# Patient Record
Sex: MALE | Race: WHITE | NOT HISPANIC OR LATINO | Employment: OTHER | RURAL
[De-identification: names, ages, dates, MRNs, and addresses within clinical notes are randomized per-mention and may not be internally consistent; named-entity substitution may affect disease eponyms.]

---

## 2017-08-08 ENCOUNTER — HISTORICAL (OUTPATIENT)
Dept: ADMINISTRATIVE | Facility: HOSPITAL | Age: 72
End: 2017-08-08

## 2017-08-15 LAB
ESTRIOL SERPL-MCNC: NORMAL NG/ML
LAB AP CLINICAL INFORMATION: NORMAL
LAB AP DIAGNOSIS - HISTORICAL: NORMAL
LAB AP GROSS PATHOLOGY - HISTORICAL: NORMAL
LAB AP SPECIMEN SUBMITTED - HISTORICAL: NORMAL

## 2020-07-20 ENCOUNTER — HISTORICAL (OUTPATIENT)
Dept: ADMINISTRATIVE | Facility: HOSPITAL | Age: 75
End: 2020-07-20

## 2020-09-21 ENCOUNTER — HISTORICAL (OUTPATIENT)
Dept: ADMINISTRATIVE | Facility: HOSPITAL | Age: 75
End: 2020-09-21

## 2020-09-21 LAB — GLUCOSE SERPL-MCNC: 107 MG/DL (ref 70–105)

## 2020-09-22 LAB

## 2021-05-24 RX ORDER — CLOPIDOGREL BISULFATE 75 MG/1
75 TABLET ORAL DAILY
Qty: 30 TABLET | Refills: 11 | OUTPATIENT
Start: 2021-05-24 | End: 2022-03-16 | Stop reason: SDUPTHER

## 2021-05-24 RX ORDER — CLOPIDOGREL BISULFATE 75 MG/1
75 TABLET ORAL DAILY
COMMUNITY
Start: 2021-02-10 | End: 2021-05-24 | Stop reason: SDUPTHER

## 2021-08-04 RX ORDER — DILTIAZEM HYDROCHLORIDE 240 MG/1
240 CAPSULE, COATED, EXTENDED RELEASE ORAL DAILY
Qty: 90 CAPSULE | Refills: 3 | Status: SHIPPED | OUTPATIENT
Start: 2021-08-04 | End: 2022-07-27 | Stop reason: SDUPTHER

## 2021-08-04 RX ORDER — METOPROLOL SUCCINATE 50 MG/1
50 TABLET, EXTENDED RELEASE ORAL DAILY
COMMUNITY
Start: 2021-03-02 | End: 2022-04-13

## 2021-08-04 RX ORDER — DILTIAZEM HYDROCHLORIDE 240 MG/1
240 CAPSULE, COATED, EXTENDED RELEASE ORAL DAILY
COMMUNITY
Start: 2021-04-28 | End: 2021-08-04 | Stop reason: SDUPTHER

## 2021-08-04 RX ORDER — ROSUVASTATIN CALCIUM 10 MG/1
10 TABLET, COATED ORAL DAILY
COMMUNITY
Start: 2021-03-02 | End: 2022-06-22 | Stop reason: DRUGHIGH

## 2021-08-04 RX ORDER — ALLOPURINOL 300 MG/1
300 TABLET ORAL DAILY
COMMUNITY
Start: 2021-03-19 | End: 2022-01-14 | Stop reason: SDUPTHER

## 2022-01-14 RX ORDER — ALLOPURINOL 300 MG/1
300 TABLET ORAL DAILY
Qty: 90 TABLET | Refills: 6 | Status: SHIPPED | OUTPATIENT
Start: 2022-01-14 | End: 2023-05-02 | Stop reason: SDUPTHER

## 2022-03-16 RX ORDER — CLOPIDOGREL BISULFATE 75 MG/1
75 TABLET ORAL DAILY
Qty: 90 TABLET | Refills: 0 | Status: SHIPPED | OUTPATIENT
Start: 2022-03-16 | End: 2022-06-14 | Stop reason: SDUPTHER

## 2022-05-20 RX ORDER — METOPROLOL SUCCINATE 50 MG/1
50 TABLET, EXTENDED RELEASE ORAL DAILY
Qty: 90 TABLET | Refills: 1 | Status: SHIPPED | OUTPATIENT
Start: 2022-05-20 | End: 2022-12-06 | Stop reason: SDUPTHER

## 2022-06-13 ENCOUNTER — APPOINTMENT (OUTPATIENT)
Dept: LAB | Facility: CLINIC | Age: 77
End: 2022-06-13
Payer: MEDICARE

## 2022-06-13 DIAGNOSIS — R53.83 FATIGUE, UNSPECIFIED TYPE: ICD-10-CM

## 2022-06-13 DIAGNOSIS — J02.9 SORE THROAT: Primary | ICD-10-CM

## 2022-06-13 DIAGNOSIS — J06.9 UPPER RESPIRATORY TRACT INFECTION, UNSPECIFIED TYPE: ICD-10-CM

## 2022-06-13 DIAGNOSIS — U07.1 COVID-19: Primary | ICD-10-CM

## 2022-06-13 LAB
CTP QC/QA: YES
FLUAV AG NPH QL: NEGATIVE
FLUBV AG NPH QL: NEGATIVE
SARS-COV-2 AG RESP QL IA.RAPID: POSITIVE

## 2022-06-13 PROCEDURE — 87428 SARSCOV & INF VIR A&B AG IA: CPT | Mod: RHCUB | Performed by: FAMILY MEDICINE

## 2022-06-14 RX ORDER — CLOPIDOGREL BISULFATE 75 MG/1
75 TABLET ORAL DAILY
Qty: 90 TABLET | Refills: 1 | Status: ON HOLD | OUTPATIENT
Start: 2022-06-14 | End: 2023-01-04 | Stop reason: SDUPTHER

## 2022-06-22 ENCOUNTER — OFFICE VISIT (OUTPATIENT)
Dept: CARDIOLOGY | Facility: CLINIC | Age: 77
End: 2022-06-22
Payer: MEDICARE

## 2022-06-22 DIAGNOSIS — E78.5 HYPERLIPIDEMIA, UNSPECIFIED HYPERLIPIDEMIA TYPE: Chronic | ICD-10-CM

## 2022-06-22 DIAGNOSIS — I25.10 CORONARY ARTERY DISEASE INVOLVING NATIVE CORONARY ARTERY OF NATIVE HEART WITHOUT ANGINA PECTORIS: Primary | Chronic | ICD-10-CM

## 2022-06-22 DIAGNOSIS — I10 HYPERTENSION, ESSENTIAL: Chronic | ICD-10-CM

## 2022-06-22 DIAGNOSIS — I25.5 ISCHEMIC CARDIOMYOPATHY: Chronic | ICD-10-CM

## 2022-06-22 PROCEDURE — 93010 ELECTROCARDIOGRAM REPORT: CPT | Mod: S$PBB,,, | Performed by: INTERNAL MEDICINE

## 2022-06-22 PROCEDURE — 99214 PR OFFICE/OUTPT VISIT, EST, LEVL IV, 30-39 MIN: ICD-10-PCS | Mod: S$PBB,,, | Performed by: INTERNAL MEDICINE

## 2022-06-22 PROCEDURE — 93005 ELECTROCARDIOGRAM TRACING: CPT | Mod: PBBFAC | Performed by: INTERNAL MEDICINE

## 2022-06-22 PROCEDURE — 99214 OFFICE O/P EST MOD 30 MIN: CPT | Mod: S$PBB,,, | Performed by: INTERNAL MEDICINE

## 2022-06-22 PROCEDURE — 99213 OFFICE O/P EST LOW 20 MIN: CPT | Mod: PBBFAC | Performed by: INTERNAL MEDICINE

## 2022-06-22 PROCEDURE — 93010 EKG 12-LEAD: ICD-10-PCS | Mod: S$PBB,,, | Performed by: INTERNAL MEDICINE

## 2022-06-22 RX ORDER — ROSUVASTATIN CALCIUM 5 MG/1
5 TABLET, COATED ORAL DAILY
Qty: 90 TABLET | Refills: 3 | Status: SHIPPED | OUTPATIENT
Start: 2022-06-22 | End: 2023-07-05 | Stop reason: SDUPTHER

## 2022-06-23 VITALS
WEIGHT: 195.25 LBS | BODY MASS INDEX: 27.95 KG/M2 | DIASTOLIC BLOOD PRESSURE: 68 MMHG | SYSTOLIC BLOOD PRESSURE: 104 MMHG | RESPIRATION RATE: 12 BRPM | HEIGHT: 70 IN | HEART RATE: 66 BPM

## 2022-06-23 PROBLEM — I25.10 CORONARY ARTERY DISEASE INVOLVING NATIVE CORONARY ARTERY OF NATIVE HEART WITHOUT ANGINA PECTORIS: Chronic | Status: ACTIVE | Noted: 2022-06-23

## 2022-06-23 PROBLEM — I25.5 ISCHEMIC CARDIOMYOPATHY: Chronic | Status: ACTIVE | Noted: 2022-06-23

## 2022-06-23 PROBLEM — E78.5 HYPERLIPIDEMIA: Chronic | Status: ACTIVE | Noted: 2022-06-23

## 2022-06-23 PROBLEM — I10 HYPERTENSION, ESSENTIAL: Chronic | Status: ACTIVE | Noted: 2022-06-23

## 2022-06-27 ENCOUNTER — OFFICE VISIT (OUTPATIENT)
Dept: FAMILY MEDICINE | Facility: CLINIC | Age: 77
End: 2022-06-27
Payer: MEDICARE

## 2022-06-27 VITALS
RESPIRATION RATE: 18 BRPM | HEIGHT: 70 IN | HEART RATE: 72 BPM | OXYGEN SATURATION: 99 % | SYSTOLIC BLOOD PRESSURE: 125 MMHG | BODY MASS INDEX: 28.06 KG/M2 | DIASTOLIC BLOOD PRESSURE: 65 MMHG | WEIGHT: 196 LBS

## 2022-06-27 DIAGNOSIS — J02.9 PHARYNGITIS, UNSPECIFIED ETIOLOGY: Primary | ICD-10-CM

## 2022-06-27 PROCEDURE — 99213 PR OFFICE/OUTPT VISIT, EST, LEVL III, 20-29 MIN: ICD-10-PCS | Mod: ,,, | Performed by: FAMILY MEDICINE

## 2022-06-27 PROCEDURE — 99213 OFFICE O/P EST LOW 20 MIN: CPT | Mod: ,,, | Performed by: FAMILY MEDICINE

## 2022-06-27 RX ORDER — AZITHROMYCIN 250 MG/1
TABLET, FILM COATED ORAL
Qty: 6 TABLET | Refills: 0 | Status: SHIPPED | OUTPATIENT
Start: 2022-06-27 | End: 2022-08-22

## 2022-06-27 NOTE — PROGRESS NOTES
Yobany Marsh MD        PATIENT NAME: Gris Hobbs  : 1945  DATE: 22  MRN: [id number]      Billing Provider: Yobany Marsh MD  Level of Service: SC OFFICE/OUTPT VISIT, EST, LEVL III, 20-29 MIN  Patient PCP Information     Provider PCP Type    Yobany Marsh MD General          Reason for Visit / Chief Complaint: Sore Throat (Patient states that he has been having mucus since last thursday)       Update PCP  Update Chief Complaint         History of Present Illness / Problem Focused Workflow     Gris Hobbs presents to the clinic with Sore Throat (Patient states that he has been having mucus since last thursday)     Cough x one week with sore throat.        Review of Systems     Review of Systems   Constitutional: Negative for activity change, appetite change, fever and unexpected weight change.   HENT: Negative for congestion, rhinorrhea, sinus pressure, sinus pain, sore throat and trouble swallowing.    Eyes: Negative for photophobia, pain, discharge and visual disturbance.   Respiratory: Positive for cough. Negative for chest tightness, wheezing and stridor.    Cardiovascular: Negative for chest pain, palpitations and leg swelling.   Gastrointestinal: Negative for abdominal pain, blood in stool, constipation, diarrhea and nausea.   Endocrine: Negative for polydipsia, polyphagia and polyuria.   Genitourinary: Negative for difficulty urinating, flank pain and hematuria.   Musculoskeletal: Negative for arthralgias and neck pain.   Skin: Negative for rash.   Allergic/Immunologic: Negative for food allergies.   Neurological: Negative for dizziness, tremors, seizures, syncope, weakness (global weakness) and headaches.   Psychiatric/Behavioral: Negative for behavioral problems, confusion, decreased concentration, dysphoric mood and hallucinations. The patient is not nervous/anxious.         Medical / Social / Family History     Past Medical History:   Diagnosis Date    Atrial fibrillation      Coronary artery disease involving native coronary artery of native heart without angina pectoris     Gout     Hyperlipidemia     Hypertension     Ischemic cardiomyopathy     NSTEMI (non-ST elevated myocardial infarction) 10/2019       Past Surgical History:   Procedure Laterality Date    APPENDECTOMY      BIOPSY WITH TRANSRECTAL ULTRASOUND (TRUS) GUIDANCE      CARDIAC CATHETERIZATION         Social History    reports that he has never smoked. He has never used smokeless tobacco. He reports that he does not drink alcohol and does not use drugs.    Family History  's family history includes Cancer in his father; Colon cancer in his father; Heart attack in his mother.    Medications and Allergies     Medications  No outpatient medications have been marked as taking for the 6/27/22 encounter (Office Visit) with Yobany Marsh MD.       Allergies  Review of patient's allergies indicates:   Allergen Reactions    Pcn [penicillins]        Physical Examination     Vitals:    06/27/22 1354   BP: 125/65   Pulse: 72   Resp: 18     Physical Exam  Constitutional:       General: He is not in acute distress.     Appearance: Normal appearance.   HENT:      Head: Normocephalic.      Right Ear: Tympanic membrane and ear canal normal.      Left Ear: Tympanic membrane and ear canal normal.      Nose: Nose normal.      Mouth/Throat:      Mouth: Mucous membranes are moist.      Pharynx: No oropharyngeal exudate.   Eyes:      Extraocular Movements: Extraocular movements intact.      Pupils: Pupils are equal, round, and reactive to light.   Cardiovascular:      Rate and Rhythm: Normal rate and regular rhythm.      Heart sounds: No murmur heard.  Pulmonary:      Effort: Pulmonary effort is normal.      Breath sounds: Normal breath sounds. No wheezing.   Abdominal:      General: Abdomen is flat. Bowel sounds are normal.      Palpations: Abdomen is soft.      Hernia: No hernia is present.   Musculoskeletal:          General: Normal range of motion.      Cervical back: Normal range of motion and neck supple.      Right lower leg: No edema.      Left lower leg: No edema.   Lymphadenopathy:      Cervical: No cervical adenopathy.   Skin:     General: Skin is warm and dry.      Coloration: Skin is not jaundiced.      Findings: No lesion.   Neurological:      General: No focal deficit present.      Mental Status: He is alert and oriented to person, place, and time.      Cranial Nerves: No cranial nerve deficit.      Gait: Gait normal.   Psychiatric:         Mood and Affect: Mood normal.         Behavior: Behavior normal.         Judgment: Judgment normal.          Assessment and Plan (including Health Maintenance)      Problem List  Smart InRadio  Document Outside HM   :    Plan:   Pharyngitis, unspecified etiology  -     azithromycin (Z-AMADA) 250 MG tablet; Take two tablets now then 1 tab daily x 4 days  Dispense: 6 tablet; Refill: 0           Health Maintenance Due   Topic Date Due    Hepatitis C Screening  Never done    Lipid Panel  Never done    TETANUS VACCINE  Never done    Shingles Vaccine (1 of 2) Never done    Pneumococcal Vaccines (Age 65+) (1 - PCV) Never done    COVID-19 Vaccine (2 - Moderna series) 03/29/2021       Problem List Items Addressed This Visit    None     Visit Diagnoses     Pharyngitis, unspecified etiology    -  Primary    Relevant Medications    azithromycin (Z-AMADA) 250 MG tablet          Health Maintenance Topics with due status: Not Due       Topic Last Completion Date    Influenza Vaccine Not Due       No future appointments.     There are no Patient Instructions on file for this visit.  Follow up if symptoms worsen or fail to improve.     Signature:  Yobany Marsh MD      Date of encounter: 6/27/22

## 2022-07-25 DIAGNOSIS — E78.5 HYPERLIPIDEMIA, UNSPECIFIED HYPERLIPIDEMIA TYPE: Primary | ICD-10-CM

## 2022-07-25 DIAGNOSIS — Z79.899 ENCOUNTER FOR LONG-TERM (CURRENT) USE OF OTHER MEDICATIONS: ICD-10-CM

## 2022-07-27 RX ORDER — DILTIAZEM HYDROCHLORIDE 240 MG/1
240 CAPSULE, COATED, EXTENDED RELEASE ORAL DAILY
Qty: 90 CAPSULE | Refills: 3 | Status: SHIPPED | OUTPATIENT
Start: 2022-07-27 | End: 2023-06-23

## 2022-08-22 ENCOUNTER — OFFICE VISIT (OUTPATIENT)
Dept: FAMILY MEDICINE | Facility: CLINIC | Age: 77
End: 2022-08-22
Payer: MEDICARE

## 2022-08-22 VITALS
WEIGHT: 193.63 LBS | OXYGEN SATURATION: 98 % | RESPIRATION RATE: 16 BRPM | BODY MASS INDEX: 27.72 KG/M2 | SYSTOLIC BLOOD PRESSURE: 160 MMHG | DIASTOLIC BLOOD PRESSURE: 80 MMHG | HEART RATE: 64 BPM | TEMPERATURE: 99 F | HEIGHT: 70 IN

## 2022-08-22 DIAGNOSIS — R14.0 GASEOUS ABDOMINAL DISTENTION: Primary | ICD-10-CM

## 2022-08-22 DIAGNOSIS — E78.5 HYPERLIPIDEMIA, UNSPECIFIED HYPERLIPIDEMIA TYPE: Chronic | ICD-10-CM

## 2022-08-22 PROCEDURE — 99213 OFFICE O/P EST LOW 20 MIN: CPT | Mod: GC,,, | Performed by: FAMILY MEDICINE

## 2022-08-22 PROCEDURE — 99213 PR OFFICE/OUTPT VISIT, EST, LEVL III, 20-29 MIN: ICD-10-PCS | Mod: GC,,, | Performed by: FAMILY MEDICINE

## 2022-08-22 RX ORDER — EZETIMIBE 10 MG/1
10 TABLET ORAL DAILY
Qty: 90 TABLET | Refills: 3 | Status: SHIPPED | OUTPATIENT
Start: 2022-08-22 | End: 2023-07-05 | Stop reason: SDUPTHER

## 2022-08-22 NOTE — PROGRESS NOTES
"Subjective:       Patient ID: Gris Hobbs is a 76 y.o. male.    Chief Complaint: Bloated ("Gaseous" feeling, no pain, abdominal since last Thursday. No change in diet, no trauma,no constipation,diarrhea, or vomiting)    This is a 76 y.o. male with HX of hyperlipidemia, HTN and CAD who present today with a gaseous feeling for 4 days. Patient states he feels no abdominal pain constipation or diarrhea. He says it just feels like he needs to belch or pass gas all the time. Patient has tried probiotic with no relief at this time. His only new medication is Zetia which he also started about 4 days ago and a side effect is abdominal fullness and bloating.    The plan is to have patient stop Zetia for now and take over the counter GAS-X. Patient also informed to decrease dairy products, carbonated drink and high fat good. If no relief of symptoms further testing to be done.        Current Outpatient Medications:     allopurinoL (ZYLOPRIM) 300 MG tablet, Take 1 tablet (300 mg total) by mouth once daily., Disp: 90 tablet, Rfl: 6    clopidogreL (PLAVIX) 75 mg tablet, Take 1 tablet (75 mg total) by mouth once daily., Disp: 90 tablet, Rfl: 1    diltiaZEM (CARDIZEM CD) 240 MG 24 hr capsule, Take 1 capsule (240 mg total) by mouth once daily., Disp: 90 capsule, Rfl: 3    ezetimibe (ZETIA) 10 mg tablet, Take 1 tablet (10 mg total) by mouth once daily., Disp: 90 tablet, Rfl: 3    metoprolol succinate (TOPROL-XL) 50 MG 24 hr tablet, Take 1 tablet (50 mg total) by mouth once daily., Disp: 90 tablet, Rfl: 1    rosuvastatin (CRESTOR) 5 MG tablet, Take 1 tablet (5 mg total) by mouth once daily., Disp: 90 tablet, Rfl: 3    azithromycin (Z-AMADA) 250 MG tablet, Take two tablets now then 1 tab daily x 4 days (Patient not taking: Reported on 8/22/2022), Disp: 6 tablet, Rfl: 0    Review of patient's allergies indicates:   Allergen Reactions    Pcn [penicillins]        Past Medical History:   Diagnosis Date    Atrial fibrillation  "    Coronary artery disease involving native coronary artery of native heart without angina pectoris     Gout     Hyperlipidemia     Hypertension     Ischemic cardiomyopathy     NSTEMI (non-ST elevated myocardial infarction) 10/2019       Past Surgical History:   Procedure Laterality Date    APPENDECTOMY      BIOPSY WITH TRANSRECTAL ULTRASOUND (TRUS) GUIDANCE      CARDIAC CATHETERIZATION         Family History   Problem Relation Age of Onset    Heart attack Mother     Colon cancer Father     Cancer Father        Social History     Tobacco Use    Smoking status: Never Smoker    Smokeless tobacco: Never Used   Substance Use Topics    Alcohol use: Never    Drug use: Never       Review of Systems   Constitutional: Negative for activity change, appetite change, fatigue and fever.   HENT: Negative for nasal congestion, dental problem, ear discharge, ear pain, facial swelling, hearing loss, mouth dryness, mouth sores, sinus pressure/congestion, sneezing and sore throat.    Eyes: Negative for pain, discharge and itching.   Respiratory: Negative for cough, choking, chest tightness and shortness of breath.    Cardiovascular: Negative for leg swelling and claudication.   Gastrointestinal: Positive for abdominal distention. Negative for abdominal pain, blood in stool, change in bowel habit, constipation, nausea, fecal incontinence and change in bowel habit.   Endocrine: Negative for cold intolerance and heat intolerance.   Genitourinary: Negative for bladder incontinence, difficulty urinating, discharge, dysuria, erectile dysfunction, flank pain and genital sores.   Musculoskeletal: Negative for arthralgias, back pain, leg pain, myalgias and joint deformity.   Integumentary:  Negative for breast discharge.   Allergic/Immunologic: Negative.    Neurological: Negative for dizziness, light-headedness, numbness, headaches, disturbances in coordination and coordination difficulties.   Hematological: Negative.   "  Psychiatric/Behavioral: Negative for agitation, behavioral problems and confusion.         Current Medications:   Medication List with Changes/Refills   Current Medications    ALLOPURINOL (ZYLOPRIM) 300 MG TABLET    Take 1 tablet (300 mg total) by mouth once daily.       Start Date: 1/14/2022 End Date: --    AZITHROMYCIN (Z-AMADA) 250 MG TABLET    Take two tablets now then 1 tab daily x 4 days       Start Date: 6/27/2022 End Date: --    CLOPIDOGREL (PLAVIX) 75 MG TABLET    Take 1 tablet (75 mg total) by mouth once daily.       Start Date: 6/14/2022 End Date: 6/14/2023    DILTIAZEM (CARDIZEM CD) 240 MG 24 HR CAPSULE    Take 1 capsule (240 mg total) by mouth once daily.       Start Date: 7/27/2022 End Date: --    EZETIMIBE (ZETIA) 10 MG TABLET    Take 1 tablet (10 mg total) by mouth once daily.       Start Date: 8/22/2022 End Date: 8/22/2023    METOPROLOL SUCCINATE (TOPROL-XL) 50 MG 24 HR TABLET    Take 1 tablet (50 mg total) by mouth once daily.       Start Date: 5/20/2022 End Date: 11/16/2022    ROSUVASTATIN (CRESTOR) 5 MG TABLET    Take 1 tablet (5 mg total) by mouth once daily.       Start Date: 6/22/2022 End Date: 6/22/2023            Objective:        Vitals:    08/22/22 1618   BP: (!) 160/80   BP Location: Left arm   Patient Position: Sitting   BP Method: Medium (Automatic)   Pulse: 64   Resp: 16   Temp: 98.5 °F (36.9 °C)   TempSrc: Oral   SpO2: 98%   Weight: 87.8 kg (193 lb 9.6 oz)   Height: 5' 10" (1.778 m)       Physical Exam  Constitutional:       Appearance: Normal appearance. He is normal weight.   HENT:      Head: Normocephalic.      Right Ear: Tympanic membrane normal.      Left Ear: Tympanic membrane normal.      Nose: Nose normal.      Mouth/Throat:      Mouth: Mucous membranes are moist.      Pharynx: Oropharynx is clear.   Eyes:      Conjunctiva/sclera: Conjunctivae normal.      Pupils: Pupils are equal, round, and reactive to light.   Cardiovascular:      Rate and Rhythm: Normal rate and regular " rhythm.      Pulses: Normal pulses.      Heart sounds: Normal heart sounds.   Pulmonary:      Effort: Pulmonary effort is normal.      Breath sounds: Normal breath sounds.   Abdominal:      General: Bowel sounds are normal. There is distension.   Musculoskeletal:         General: Normal range of motion.      Cervical back: Normal range of motion.   Skin:     General: Skin is warm and dry.      Capillary Refill: Capillary refill takes less than 2 seconds.   Neurological:      General: No focal deficit present.      Mental Status: He is alert.   Psychiatric:         Mood and Affect: Mood normal.               Lab Results   Component Value Date    CHOL 179 08/08/2022    TRIG 83 08/08/2022    HDL 49 08/08/2022    ALT 22 08/08/2022      Assessment:       1. Gaseous abdominal distention    2. Hyperlipidemia, unspecified hyperlipidemia type        Plan:         Problem List Items Addressed This Visit        Cardiac/Vascular    Hyperlipidemia (Chronic)     Hold Zetia              GI    Gaseous abdominal distention - Primary     Hold Zetia for now--abdominal fullness and bloating is side effect  -GAS-X OTC  -Patient informed to decrease dairy products, carbonated beverages, and High Fat food.  - More investigative test if no relief                   No follow-ups on file.    Miguel Gay MD     Instructed patient that if symptoms fail to improve or worsen patient should seek immediate medical attention or report to the nearest emergency department. Patient expressed verbal agreement and understanding to this plan of care.

## 2022-08-22 NOTE — ASSESSMENT & PLAN NOTE
Hold Zetia for now--abdominal fullness and bloating is side effect  -GAS-X OTC  -Patient informed to decrease dairy products, carbonated beverages, and High Fat food.  - More investigative test if no relief

## 2022-08-24 ENCOUNTER — OFFICE VISIT (OUTPATIENT)
Dept: FAMILY MEDICINE | Facility: CLINIC | Age: 77
End: 2022-08-24
Payer: MEDICARE

## 2022-08-24 VITALS
HEART RATE: 71 BPM | TEMPERATURE: 98 F | WEIGHT: 193.81 LBS | HEIGHT: 72 IN | OXYGEN SATURATION: 97 % | BODY MASS INDEX: 26.25 KG/M2 | SYSTOLIC BLOOD PRESSURE: 148 MMHG | DIASTOLIC BLOOD PRESSURE: 89 MMHG

## 2022-08-24 DIAGNOSIS — R14.0 GASEOUS ABDOMINAL DISTENTION: Primary | ICD-10-CM

## 2022-08-24 DIAGNOSIS — K29.70 GASTRITIS WITHOUT BLEEDING, UNSPECIFIED CHRONICITY, UNSPECIFIED GASTRITIS TYPE: ICD-10-CM

## 2022-08-24 PROCEDURE — 99212 OFFICE O/P EST SF 10 MIN: CPT | Mod: GC,,, | Performed by: FAMILY MEDICINE

## 2022-08-24 PROCEDURE — 99212 PR OFFICE/OUTPT VISIT, EST, LEVL II, 10-19 MIN: ICD-10-PCS | Mod: GC,,, | Performed by: FAMILY MEDICINE

## 2022-08-24 NOTE — ASSESSMENT & PLAN NOTE
- Likely secondary to gastritis or gastroduodenitis/gastroesophagitis.  - Patient advised to avoid foods that trigger symptoms such as spicy foods, spaghetti, tomatoes, salsa, bell peppers etc. As well as carbonated beverages.  - Omeprazole 40mg qd OTC 30min before food for 7-10 days, then decrease to 20mg qd afterwards PRN. Tums chewable antacids OTC PRN  - stop all NSAIDs but continue daily aspirin after breakfast for MI and stroke prophylaxis.  - continue to hold Zetia   - RTC PRN or to ED if symptoms worsen

## 2022-08-24 NOTE — PROGRESS NOTES
Subjective:       Patient ID: Gris Hobbs is a 76 y.o. male.    Chief Complaint: Follow-up (BLOATING )    Gris Hobbs is a 75yo  male with a PMH of CAD with 3 stents on Plavix and aspirin, ischemic cardiomyopathy, HTN, and HLD who presents to [facility] for follow-up. 6 days ago, patient developed bloating sensation in the abdomen with increased gas passing. Patient came to the [facility] clinic 2 days ago for the same complaints and was told to hold Zetia as it could be the side effects causing the symptoms. He was also told to avoid carbonated beverages, fatty foods, and dairy products. Patient was compliant but still having the same symptoms, although he reports the symptoms are improving. He described the bloating sensation as full and tight all over the abdomen. Patient rated bloating as 10/10 two days ago and 6.5/10 today. GasX OTC relieved symptoms and patient last took two pills last night. Patient wasn't able to belch 2 days ago but was able to yesterday which provided relief of symptoms. Patient is driving across states for the upcoming 5-6 days and wanted to make sure he's okay to travel. Patient denies N/V/D and denies decreased appetite. Patient has one BM daily which is his baseline. Patient eats, drinks, and uses the bathroom fine. Patient denies history of pancreatitis, cholecystectomy, or hepatitis. Patient denies heavy alcohol use.        Current Outpatient Medications:     allopurinoL (ZYLOPRIM) 300 MG tablet, Take 1 tablet (300 mg total) by mouth once daily., Disp: 90 tablet, Rfl: 6    clopidogreL (PLAVIX) 75 mg tablet, Take 1 tablet (75 mg total) by mouth once daily., Disp: 90 tablet, Rfl: 1    diltiaZEM (CARDIZEM CD) 240 MG 24 hr capsule, Take 1 capsule (240 mg total) by mouth once daily., Disp: 90 capsule, Rfl: 3    metoprolol succinate (TOPROL-XL) 50 MG 24 hr tablet, Take 1 tablet (50 mg total) by mouth once daily., Disp: 90 tablet, Rfl: 1    ezetimibe (ZETIA) 10 mg tablet, Take  1 tablet (10 mg total) by mouth once daily. (Patient not taking: Reported on 8/24/2022), Disp: 90 tablet, Rfl: 3    rosuvastatin (CRESTOR) 5 MG tablet, Take 1 tablet (5 mg total) by mouth once daily. (Patient not taking: Reported on 8/24/2022), Disp: 90 tablet, Rfl: 3    Review of patient's allergies indicates:   Allergen Reactions    Pcn [penicillins]        Past Medical History:   Diagnosis Date    Atrial fibrillation     Coronary artery disease involving native coronary artery of native heart without angina pectoris     Gout     Hyperlipidemia     Hypertension     Ischemic cardiomyopathy     NSTEMI (non-ST elevated myocardial infarction) 10/2019       Past Surgical History:   Procedure Laterality Date    APPENDECTOMY      BIOPSY WITH TRANSRECTAL ULTRASOUND (TRUS) GUIDANCE      CARDIAC CATHETERIZATION         Family History   Problem Relation Age of Onset    Heart attack Mother     Colon cancer Father     Cancer Father        Social History     Tobacco Use    Smoking status: Never Smoker    Smokeless tobacco: Never Used   Substance Use Topics    Alcohol use: Never    Drug use: Never       Review of Systems   Constitutional: Negative for appetite change, chills, diaphoresis and fever.   HENT: Negative for hearing loss, sneezing and trouble swallowing.    Eyes: Negative for discharge and redness.   Respiratory: Negative for cough and shortness of breath.    Cardiovascular: Negative for chest pain.   Gastrointestinal: Positive for abdominal distention. Negative for abdominal pain, blood in stool, change in bowel habit, diarrhea, nausea, vomiting and change in bowel habit.        Bloating, tight, full sensation over abdomen   Genitourinary: Negative for difficulty urinating, dysuria and hematuria.   Musculoskeletal: Negative for joint deformity.   Integumentary:  Negative for wound.   Neurological: Negative for speech difficulty, disturbances in coordination and coordination difficulties.    Psychiatric/Behavioral: Negative for agitation, behavioral problems and confusion.         Current Medications:   Medication List with Changes/Refills   Current Medications    ALLOPURINOL (ZYLOPRIM) 300 MG TABLET    Take 1 tablet (300 mg total) by mouth once daily.       Start Date: 1/14/2022 End Date: --    CLOPIDOGREL (PLAVIX) 75 MG TABLET    Take 1 tablet (75 mg total) by mouth once daily.       Start Date: 6/14/2022 End Date: 6/14/2023    DILTIAZEM (CARDIZEM CD) 240 MG 24 HR CAPSULE    Take 1 capsule (240 mg total) by mouth once daily.       Start Date: 7/27/2022 End Date: --    EZETIMIBE (ZETIA) 10 MG TABLET    Take 1 tablet (10 mg total) by mouth once daily.       Start Date: 8/22/2022 End Date: 8/22/2023    METOPROLOL SUCCINATE (TOPROL-XL) 50 MG 24 HR TABLET    Take 1 tablet (50 mg total) by mouth once daily.       Start Date: 5/20/2022 End Date: 11/16/2022    ROSUVASTATIN (CRESTOR) 5 MG TABLET    Take 1 tablet (5 mg total) by mouth once daily.       Start Date: 6/22/2022 End Date: 6/22/2023            Objective:        Vitals:    08/24/22 1523 08/24/22 1524   BP: (!) 162/80 (!) 148/89   Pulse: 85 71   Temp: 98.3 °F (36.8 °C)    TempSrc: Temporal    SpO2: 97%    Weight: 87.9 kg (193 lb 12.8 oz)    Height: 6' (1.829 m)        Physical Exam  Vitals and nursing note reviewed.   Constitutional:       General: He is not in acute distress.     Appearance: Normal appearance. He is not ill-appearing, toxic-appearing or diaphoretic.   HENT:      Head: Normocephalic and atraumatic.      Right Ear: External ear normal.      Left Ear: External ear normal.      Nose: Nose normal.      Mouth/Throat:      Pharynx: Oropharynx is clear.   Eyes:      General:         Right eye: No discharge.         Left eye: No discharge.      Conjunctiva/sclera: Conjunctivae normal.      Pupils: Pupils are equal, round, and reactive to light.      Comments: Chalazion on right lower eyelid without drainage of pus or blood.   Cardiovascular:       Rate and Rhythm: Normal rate. Rhythm irregular.      Pulses: Normal pulses.      Comments: Irregularly regular rhythm consistent with history of Afib  Pulmonary:      Effort: Pulmonary effort is normal.      Breath sounds: Normal breath sounds.   Abdominal:      General: Abdomen is flat. Bowel sounds are normal.      Palpations: Abdomen is soft.      Tenderness: There is abdominal tenderness. There is no guarding or rebound.      Comments: Center epigastric mild TTP   Musculoskeletal:         General: No deformity.      Cervical back: Neck supple.   Skin:     General: Skin is warm.      Coloration: Skin is not jaundiced.   Neurological:      General: No focal deficit present.      Mental Status: He is alert.      Coordination: Coordination normal.      Gait: Gait normal.   Psychiatric:         Mood and Affect: Mood normal.         Behavior: Behavior normal.               Lab Results   Component Value Date    CHOL 179 08/08/2022    TRIG 83 08/08/2022    HDL 49 08/08/2022    ALT 22 08/08/2022      Assessment:       1. Gaseous abdominal distention    2. Gastritis without bleeding, unspecified chronicity, unspecified gastritis type        Plan:         Problem List Items Addressed This Visit        GI    Gaseous abdominal distention - Primary     - Likely secondary to gastritis or gastroduodenitis/gastroesophagitis.  - Patient advised to avoid foods that trigger symptoms such as spicy foods, spaghetti, tomatoes, salsa, bell peppers etc. As well as carbonated beverages.  - Omeprazole 40mg qd OTC 30min before food for 7-10 days, then decrease to 20mg qd afterwards PRN. Tums chewable antacids OTC PRN  - stop all NSAIDs but continue daily aspirin after breakfast for MI and stroke prophylaxis.  - continue to hold Zetia   - RTC PRN or to ED if symptoms worsen             Gastritis     See gaseous abdominal distention                   Follow up if symptoms worsen or fail to improve, for bloating.    Ekaterina Melgar DO      Instructed patient that if symptoms fail to improve or worsen patient should seek immediate medical attention or report to the nearest emergency department. Patient expressed verbal agreement and understanding to this plan of care.

## 2022-08-31 DIAGNOSIS — R10.9 ABDOMINAL PAIN, UNSPECIFIED ABDOMINAL LOCATION: Primary | ICD-10-CM

## 2022-09-01 DIAGNOSIS — R10.9 ABDOMINAL PAIN, UNSPECIFIED ABDOMINAL LOCATION: Primary | ICD-10-CM

## 2022-09-02 ENCOUNTER — HOSPITAL ENCOUNTER (OUTPATIENT)
Dept: RADIOLOGY | Facility: HOSPITAL | Age: 77
Discharge: HOME | End: 2022-09-02
Attending: FAMILY MEDICINE
Payer: MEDICARE

## 2022-09-02 DIAGNOSIS — R10.9 ABDOMINAL PAIN, UNSPECIFIED ABDOMINAL LOCATION: ICD-10-CM

## 2022-09-02 PROCEDURE — 74178 CT ABD&PLV WO CNTR FLWD CNTR: CPT | Mod: TC

## 2022-09-02 PROCEDURE — 25500020 PHARM REV CODE 255: Performed by: FAMILY MEDICINE

## 2022-09-02 RX ADMIN — IOPAMIDOL 100 ML: 755 INJECTION, SOLUTION INTRAVENOUS at 09:09

## 2022-09-09 DIAGNOSIS — Z71.89 COMPLEX CARE COORDINATION: ICD-10-CM

## 2022-09-15 ENCOUNTER — OFFICE VISIT (OUTPATIENT)
Dept: GASTROENTEROLOGY | Facility: CLINIC | Age: 77
End: 2022-09-15
Payer: MEDICARE

## 2022-09-15 VITALS
OXYGEN SATURATION: 98 % | DIASTOLIC BLOOD PRESSURE: 82 MMHG | HEIGHT: 72 IN | SYSTOLIC BLOOD PRESSURE: 121 MMHG | HEART RATE: 83 BPM | WEIGHT: 190.81 LBS | BODY MASS INDEX: 25.84 KG/M2

## 2022-09-15 DIAGNOSIS — R14.0 BLOATING: ICD-10-CM

## 2022-09-15 DIAGNOSIS — K21.9 GASTROESOPHAGEAL REFLUX DISEASE, UNSPECIFIED WHETHER ESOPHAGITIS PRESENT: Primary | ICD-10-CM

## 2022-09-15 PROCEDURE — 99214 PR OFFICE/OUTPT VISIT, EST, LEVL IV, 30-39 MIN: ICD-10-PCS | Mod: ,,, | Performed by: NURSE PRACTITIONER

## 2022-09-15 PROCEDURE — 99214 OFFICE O/P EST MOD 30 MIN: CPT | Mod: ,,, | Performed by: NURSE PRACTITIONER

## 2022-09-15 RX ORDER — OMEPRAZOLE 20 MG/1
20 CAPSULE, DELAYED RELEASE ORAL 2 TIMES DAILY
COMMUNITY
End: 2023-03-21 | Stop reason: ALTCHOICE

## 2022-09-15 NOTE — PROGRESS NOTES
"    Gris Hobbs is a 76 y.o. male here for Abdominal Pain        PCP: Yobany Marsh  Referring Provider: No referring provider defined for this encounter.     HPI:  Presents for report of bloating. He is currently taking Omeprazole 20 mg, but this has not improved his discomfort. Reports that he feels "full", but denies early satiety.Endorses dysphagia.Worse with bread. No EGD in [facility]. Reports that he ate red beans and rice yesterday and did have some increased discomfort.No hematochezia or melena. He does have constipation. States that he has been drinking prune juice for constipation. No laxative use. Last colonoscopy 9/21/20, tubular adenoma x 2. He does have AFIB.    Abdominal Pain  Associated symptoms include constipation. Pertinent negatives include no arthralgias, diarrhea, fever, headaches, myalgias, nausea or vomiting.       ROS:  Review of Systems   Constitutional:  Negative for activity change, appetite change, fatigue, fever and unexpected weight change.   HENT:  Negative for dental problem, sore throat and trouble swallowing.    Respiratory:  Negative for cough, shortness of breath and wheezing.    Cardiovascular:  Positive for palpitations. Negative for chest pain and leg swelling.   Gastrointestinal:  Positive for abdominal distention (bloating), abdominal pain, constipation and reflux. Negative for anal bleeding, blood in stool, change in bowel habit, diarrhea, nausea, rectal pain, vomiting, fecal incontinence and change in bowel habit.   Musculoskeletal:  Positive for gait problem. Negative for arthralgias and myalgias.   Integumentary:  Negative for color change and rash.   Neurological:  Negative for dizziness, syncope, weakness, light-headedness, numbness and headaches.   Hematological:  Does not bruise/bleed easily.   Psychiatric/Behavioral:  Negative for sleep disturbance. The patient is not nervous/anxious.         PMHX:  has a past medical history of Atrial fibrillation, Coronary " artery disease involving native coronary artery of native heart without angina pectoris, Gout, Hyperlipidemia, Hypertension, Ischemic cardiomyopathy, and NSTEMI (non-ST elevated myocardial infarction) (10/2019).    PSHX:  has a past surgical history that includes Appendectomy; Biopsy with transrectal ultrasound (TRUS) guidance; and Cardiac catheterization.    PFHX: family history includes Cancer in his father; Colon cancer in his father; Heart attack in his mother.    PSlHX:  reports that he has never smoked. He has never used smokeless tobacco. He reports that he does not drink alcohol and does not use drugs.        Review of patient's allergies indicates:   Allergen Reactions    Pcn [penicillins]        Medication List with Changes/Refills   Current Medications    ALLOPURINOL (ZYLOPRIM) 300 MG TABLET    Take 1 tablet (300 mg total) by mouth once daily.    CLOPIDOGREL (PLAVIX) 75 MG TABLET    Take 1 tablet (75 mg total) by mouth once daily.    DILTIAZEM (CARDIZEM CD) 240 MG 24 HR CAPSULE    Take 1 capsule (240 mg total) by mouth once daily.    EZETIMIBE (ZETIA) 10 MG TABLET    Take 1 tablet (10 mg total) by mouth once daily.    METOPROLOL SUCCINATE (TOPROL-XL) 50 MG 24 HR TABLET    Take 1 tablet (50 mg total) by mouth once daily.    OMEPRAZOLE (PRILOSEC) 20 MG CAPSULE    Take 20 mg by mouth 2 (two) times a day.    ROSUVASTATIN (CRESTOR) 5 MG TABLET    Take 1 tablet (5 mg total) by mouth once daily.        Objective Findings:  Vital Signs:  /82   Pulse 83   Ht 6' (1.829 m)   Wt 86.5 kg (190 lb 12.8 oz)   SpO2 98%   BMI 25.88 kg/m²  Body mass index is 25.88 kg/m².    Physical Exam:  Physical Exam  Vitals and nursing note reviewed.   Constitutional:       General: He is not in acute distress.     Appearance: Normal appearance.   HENT:      Mouth/Throat:      Mouth: Mucous membranes are moist.   Cardiovascular:      Rate and Rhythm: Normal rate.   Pulmonary:      Effort: Pulmonary effort is normal.       Breath sounds: No wheezing, rhonchi or rales.   Abdominal:      General: Bowel sounds are normal. There is no distension.      Palpations: Abdomen is soft. There is no mass.      Tenderness: There is abdominal tenderness. There is no guarding or rebound.      Hernia: No hernia is present.   Musculoskeletal:      Right lower leg: No edema.      Left lower leg: No edema.   Skin:     General: Skin is warm and dry.      Coloration: Skin is not jaundiced or pale.      Findings: Bruising present.   Neurological:      Mental Status: He is alert and oriented to person, place, and time.      Gait: Gait abnormal.   Psychiatric:         Mood and Affect: Mood normal.        Labs:  Lab Results   Component Value Date    WBC 6.59 09/01/2022    HGB 15.1 09/01/2022    HCT 46.0 09/01/2022    MCV 94.7 09/01/2022    RDW 14.4 09/01/2022     09/01/2022    LYMPH 19.1 (L) 09/01/2022    LYMPH 1.26 09/01/2022    MONO 9.4 (H) 09/01/2022    EOS 0.15 09/01/2022    BASO 0.04 09/01/2022     Lab Results   Component Value Date     09/01/2022    K 5.7 (H) 09/01/2022     09/01/2022    CO2 27 09/01/2022     (H) 09/01/2022    BUN 14 09/01/2022    CREATININE 0.97 09/01/2022    CALCIUM 9.8 09/01/2022    PROT 7.4 09/01/2022    ALBUMIN 4.3 09/01/2022    BILITOT 1.0 09/01/2022    ALKPHOS 80 09/01/2022    AST 21 09/01/2022    ALT 25 09/01/2022         Imaging: CT Abdomen Pelvis W Wo Contrast    Result Date: 9/2/2022  EXAMINATION: CT ABDOMEN PELVIS W WO CONTRAST CLINICAL HISTORY: Abdominal pain, acute, nonlocalized;Unspecified abdominal pain COMPARISON: 2013 TECHNIQUE: CT ABDOMEN PELVIS W WO CONTRAST FINDINGS: Lower lobes: Clear. Cardiac: No effusion. Abdomen: Hepatobiliary/gallbladder: Normal Spleen: Normal Pancreas: Severe fatty atrophy of the pancreas. Adrenal/Genitourinary system: Normal Bowel and Mesentery: There is no evidence for bowel obstruction. Peritoneum: Normal. Retroperitoneum: No enlarged lymph nodes. Vasculature:  Calcified vascular disease Reproductive: Moderately enlarged prostate Lymph nodes: No enlarged lymph nodes. Abdominal wall: Small fat containing bilateral inguinal hernias. Osseous structures: Mild multilevel degenerative change.     1. No evidence to suggest acute pathology within the abdomen or pelvis. Small fat containing bilateral inguinal hernias. Moderately enlarged prostate gland. Electronically signed by: Miguel Angel Escobar Date:    09/02/2022 Time:    09:50        Assessment:  Gris Hobbs is a 76 y.o. male here with:  1. Gastroesophageal reflux disease, unspecified whether esophagitis present    2. Bloating          Recommendations:  1. Continue Omeprazole  2. EGD/Dilation  3. Avoid spicy, greasy foods  Avoid caffeine, citric acid, chocolate, peppermint, and carbonated drinks  Do not lay down within 3 hours of eating  Increase fluid to 64 ounces daily  Avoid antiinflammatory medications such as motrin, advil, aleve, ibuprofen, and BC powder  4. LOFOD MAP diet  5. Miralax powder 17 grams in 8 ounces of liquid      Follow up in about 6 weeks (around 10/27/2022).      Order summary:  Orders Placed This Encounter    EGD w Dilation       Thank you for allowing me to participate in the care of Gris Hobbs.      EVELYN Hamlin

## 2022-09-15 NOTE — PATIENT INSTRUCTIONS
Avoid spicy, greasy foods  Avoid caffeine, citric acid, chocolate, peppermint, and carbonated drinks  Do not lay down within 3 hours of eating  Increase fluid to 64 ounces daily  Avoid antiinflammatory medications such as motrin, advil, aleve, ibuprofen, and BC powder  Will need to hold Plavix for 7 days prior to EGD    Miralax 17 grams in 8 ounces of liquid daily

## 2022-09-19 ENCOUNTER — TELEPHONE (OUTPATIENT)
Dept: CARDIOLOGY | Facility: CLINIC | Age: 77
End: 2022-09-19
Payer: MEDICARE

## 2022-09-19 NOTE — TELEPHONE ENCOUNTER
Notified pt that it is okay to hold his Plavix for 5 days prior to scope.  Last coronary artery stents in 2019.  Pt voiced understanding.

## 2022-10-07 ENCOUNTER — HOSPITAL ENCOUNTER (OUTPATIENT)
Dept: GASTROENTEROLOGY | Facility: HOSPITAL | Age: 77
Discharge: HOME | End: 2022-10-07
Attending: NURSE PRACTITIONER
Payer: MEDICARE

## 2022-10-07 ENCOUNTER — ANESTHESIA EVENT (OUTPATIENT)
Dept: GASTROENTEROLOGY | Facility: HOSPITAL | Age: 77
End: 2022-10-07
Payer: MEDICARE

## 2022-10-07 ENCOUNTER — ANESTHESIA (OUTPATIENT)
Dept: GASTROENTEROLOGY | Facility: HOSPITAL | Age: 77
End: 2022-10-07
Payer: MEDICARE

## 2022-10-07 VITALS
RESPIRATION RATE: 14 BRPM | HEART RATE: 67 BPM | TEMPERATURE: 98 F | SYSTOLIC BLOOD PRESSURE: 123 MMHG | DIASTOLIC BLOOD PRESSURE: 67 MMHG | OXYGEN SATURATION: 98 %

## 2022-10-07 DIAGNOSIS — R13.19 ESOPHAGEAL DYSPHAGIA: ICD-10-CM

## 2022-10-07 DIAGNOSIS — K21.9 GASTROESOPHAGEAL REFLUX DISEASE, UNSPECIFIED WHETHER ESOPHAGITIS PRESENT: ICD-10-CM

## 2022-10-07 DIAGNOSIS — R14.0 BLOATING: ICD-10-CM

## 2022-10-07 DIAGNOSIS — K29.00 ACUTE SUPERFICIAL GASTRITIS WITHOUT HEMORRHAGE: Primary | ICD-10-CM

## 2022-10-07 PROCEDURE — 88305 TISSUE EXAM BY PATHOLOGIST: CPT | Mod: 26,XU,, | Performed by: PATHOLOGY

## 2022-10-07 PROCEDURE — 43450 DILATE ESOPHAGUS 1/MULT PASS: CPT

## 2022-10-07 PROCEDURE — D9220A PRA ANESTHESIA: Mod: ,,, | Performed by: NURSE ANESTHETIST, CERTIFIED REGISTERED

## 2022-10-07 PROCEDURE — 88305 SURGICAL PATHOLOGY: ICD-10-PCS | Mod: 26,XU,, | Performed by: PATHOLOGY

## 2022-10-07 PROCEDURE — D9220A PRA ANESTHESIA: ICD-10-PCS | Mod: ,,, | Performed by: NURSE ANESTHETIST, CERTIFIED REGISTERED

## 2022-10-07 PROCEDURE — 88341 SURGICAL PATHOLOGY: ICD-10-PCS | Mod: 26,,, | Performed by: PATHOLOGY

## 2022-10-07 PROCEDURE — 88342 IMHCHEM/IMCYTCHM 1ST ANTB: CPT | Mod: 26,,, | Performed by: PATHOLOGY

## 2022-10-07 PROCEDURE — 88341 IMHCHEM/IMCYTCHM EA ADD ANTB: CPT | Mod: 26,,, | Performed by: PATHOLOGY

## 2022-10-07 PROCEDURE — 88342 SURGICAL PATHOLOGY: ICD-10-PCS | Mod: 26,,, | Performed by: PATHOLOGY

## 2022-10-07 PROCEDURE — 37000008 HC ANESTHESIA 1ST 15 MINUTES

## 2022-10-07 PROCEDURE — 63600175 PHARM REV CODE 636 W HCPCS: Performed by: NURSE ANESTHETIST, CERTIFIED REGISTERED

## 2022-10-07 PROCEDURE — 25000003 PHARM REV CODE 250: Performed by: NURSE ANESTHETIST, CERTIFIED REGISTERED

## 2022-10-07 PROCEDURE — 43239 EGD BIOPSY SINGLE/MULTIPLE: CPT

## 2022-10-07 PROCEDURE — 88305 TISSUE EXAM BY PATHOLOGIST: CPT | Mod: SUR,59 | Performed by: INTERNAL MEDICINE

## 2022-10-07 RX ORDER — PROPOFOL 10 MG/ML
VIAL (ML) INTRAVENOUS
Status: DISCONTINUED | OUTPATIENT
Start: 2022-10-07 | End: 2022-10-07

## 2022-10-07 RX ORDER — SODIUM CHLORIDE 0.9 % (FLUSH) 0.9 %
10 SYRINGE (ML) INJECTION
Status: DISCONTINUED | OUTPATIENT
Start: 2022-10-07 | End: 2022-10-08 | Stop reason: HOSPADM

## 2022-10-07 RX ORDER — LIDOCAINE HYDROCHLORIDE 20 MG/ML
INJECTION, SOLUTION EPIDURAL; INFILTRATION; INTRACAUDAL; PERINEURAL
Status: DISCONTINUED | OUTPATIENT
Start: 2022-10-07 | End: 2022-10-07

## 2022-10-07 RX ADMIN — SODIUM CHLORIDE: 9 INJECTION, SOLUTION INTRAVENOUS at 11:10

## 2022-10-07 RX ADMIN — LIDOCAINE HYDROCHLORIDE 100 MG: 20 INJECTION, SOLUTION EPIDURAL; INFILTRATION; INTRACAUDAL; PERINEURAL at 11:10

## 2022-10-07 RX ADMIN — PROPOFOL 25 MG: 10 INJECTION, EMULSION INTRAVENOUS at 12:10

## 2022-10-07 RX ADMIN — PROPOFOL 50 MG: 10 INJECTION, EMULSION INTRAVENOUS at 11:10

## 2022-10-07 NOTE — H&P
[facility] - Endoscopy  Gastroenterology  H&P    Patient Name: Gris Hobbs  MRN: [id number]  Admission Date: 10/7/2022  Code Status: Full Code    Attending Provider: EDER Coleman   Primary Care Physician: Yobany Marsh MD  Principal Problem:<principal problem not specified>    Subjective:     History of Present Illness: Pt has globus sensation and abdominal bloating; for egd +/- dilation of esophagus.    Past Medical History:   Diagnosis Date    Atrial fibrillation     Coronary artery disease involving native coronary artery of native heart without angina pectoris     Gout     Hyperlipidemia     Hypertension     Ischemic cardiomyopathy     NSTEMI (non-ST elevated myocardial infarction) 10/2019       Past Surgical History:   Procedure Laterality Date    APPENDECTOMY      BIOPSY WITH TRANSRECTAL ULTRASOUND (TRUS) GUIDANCE      CARDIAC CATHETERIZATION         Review of patient's allergies indicates:   Allergen Reactions    Pcn [penicillins]      Family History       Problem Relation (Age of Onset)    Cancer Father    Colon cancer Father    Heart attack Mother          Tobacco Use    Smoking status: Never    Smokeless tobacco: Never   Substance and Sexual Activity    Alcohol use: Never    Drug use: Never    Sexual activity: Not on file     Review of Systems   Respiratory: Negative.     Cardiovascular: Negative.    Gastrointestinal: Negative.    Objective:     Vital Signs (Most Recent):  Pulse: 76 (10/07/22 1109)  Resp: 14 (10/07/22 1109)  BP: (!) 162/87 (10/07/22 1109)  SpO2: 96 % (10/07/22 1109)   Vital Signs (24h Range):  Pulse:  [76] 76  Resp:  [14] 14  SpO2:  [96 %] 96 %  BP: (162)/(87) 162/87        There is no height or weight on file to calculate BMI.    No intake or output data in the 24 hours ending 10/07/22 1155    Lines/Drains/Airways       Peripheral Intravenous Line  Duration                  Peripheral IV - Single Lumen 10/07/22 1109 22 G Left Forearm <1 day                    Physical  Exam  Vitals reviewed.   Constitutional:       General: He is not in acute distress.     Appearance: Normal appearance. He is well-developed. He is not ill-appearing.   HENT:      Head: Normocephalic and atraumatic.      Nose: Nose normal.   Eyes:      Pupils: Pupils are equal, round, and reactive to light.   Cardiovascular:      Rate and Rhythm: Normal rate and regular rhythm.   Pulmonary:      Effort: Pulmonary effort is normal.      Breath sounds: Normal breath sounds. No wheezing.   Abdominal:      General: Abdomen is flat. Bowel sounds are normal. There is no distension.      Palpations: Abdomen is soft.      Tenderness: There is no abdominal tenderness. There is no guarding.   Skin:     General: Skin is warm and dry.      Coloration: Skin is not jaundiced.   Neurological:      Mental Status: He is alert.   Psychiatric:         Attention and Perception: Attention normal.         Mood and Affect: Affect normal.         Speech: Speech normal.         Behavior: Behavior is cooperative.      Comments: Pt was calm while speaking.       Significant Labs:  CBC: No results for input(s): WBC, HGB, HCT, PLT in the last 48 hours.  CMP: No results for input(s): GLU, CALCIUM, ALBUMIN, PROT, NA, K, CO2, CL, BUN, CREATININE, ALKPHOS, ALT, AST, BILITOT in the last 48 hours.    Significant Imaging:  Imaging results within the past 24 hours have been reviewed.    Assessment/Plan:     There are no hospital problems to display for this patient.        Imp: bloating, globus sensation  Plan: egd +/- dilation    Pavan Cerrato MD  Gastroenterology  [facility] - Endoscopy

## 2022-10-07 NOTE — DISCHARGE INSTRUCTIONS
Procedure Date  10/7/22     Impression  Overall Impression: Mucosa of the esophagus was normal. The distal esophagus was biopsied and the esophagus was dilated with a 48F Cheung. The stomach has erythema without ulcers, biopsies were obtained. Mucosa of the duodenum had white material present, so biopsies were obtained.     Recommendation    Await pathology results      Avoid nsaids; schedule gastric emptying study for bloating, then return to GI clinic. Consider trial of Creon before meals, if symptoms persist. Repeat esophageal dilation prn.  NO DRIVING, OPERATING EQUIPMENT, OR SIGNING LEGAL DOCUMENTS FOR 24 HOURS.   THE NURSE WILL CALL YOU WITH YOUR BIOPSY RESULTS IN A FEW DAYS.

## 2022-10-07 NOTE — ANESTHESIA PREPROCEDURE EVALUATION
10/07/2022  Gris Hobbs is a 76 y.o., male.      Pre-op Assessment    I have reviewed the Patient Summary Reports.     I have reviewed the Nursing Notes. I have reviewed the NPO Status.   I have reviewed the Medications.     Review of Systems  Anesthesia Hx:  No problems with previous Anesthesia    Social:  Non-Smoker    Cardiovascular:   Hypertension Past MI CAD  CABG/stent Dysrhythmias atrial fibrillation hyperlipidemia Last coronary stent placed in 2019.  Pt denies any recent CP or SOB.   Hepatic/GI:   GERD        Physical Exam  General: Well nourished        Anesthesia Plan  Type of Anesthesia, risks & benefits discussed:    Anesthesia Type: Gen Natural Airway  Intra-op Monitoring Plan: Standard ASA Monitors  Post Op Pain Control Plan: IV/PO Opioids PRN  Induction:  IV  Informed Consent: Informed consent signed with the Patient and all parties understand the risks and agree with anesthesia plan.  All questions answered. Patient consented to blood products? Yes  ASA Score: 3  Day of Surgery Review of History & Physical: H&P Update referred to the surgeon/provider.I have interviewed and examined the patient. I have reviewed the patient's H&P dated: There are no significant changes.     Ready For Surgery From Anesthesia Perspective.     .

## 2022-10-07 NOTE — TRANSFER OF CARE
Anesthesia Transfer of Care Note    Patient: Gris Hobbs    Procedure(s) Performed: egd/dilation *    Patient location: GI    Anesthesia Type: general    Transport from OR: Transported from OR on room air with adequate spontaneous ventilation. Continuous ECG monitoring in transport. Continuous SpO2 monitoring in transport    Post pain: adequate analgesia    Post assessment: no apparent anesthetic complications    Post vital signs: stable    Level of consciousness: sedated and responds to stimulation    Nausea/Vomiting: no nausea/vomiting    Complications: none    Transfer of care protocol was followedComments: Good SV continue, NAD, VSS, RTRN      Last vitals:   Visit Vitals  /66   Pulse 80   Temp 36.8 °C (98.2 °F) (Oral)   Resp 16   SpO2 98%

## 2022-10-07 NOTE — ANESTHESIA POSTPROCEDURE EVALUATION
Anesthesia Post Evaluation    Patient: Gris Hobbs    Procedure(s) Performed: * No procedures listed *    Final Anesthesia Type: general      Patient location during evaluation: GI PACU  Patient participation: Yes- Able to Participate  Level of consciousness: awake and alert  Post-procedure vital signs: reviewed and stable  Pain management: adequate  Airway patency: patent    PONV status at discharge: No PONV  Anesthetic complications: no      Cardiovascular status: blood pressure returned to baseline and hemodynamically stable  Respiratory status: spontaneous ventilation  Hydration status: euvolemic  Follow-up not needed.          Vitals Value Taken Time   /67 10/07/22 1240   Temp 36.8 °C (98.2 °F) 10/07/22 1205   Pulse 71 10/07/22 1240   Resp 16 10/07/22 1240   SpO2 98 % 10/07/22 1240   Vitals shown include unvalidated device data.      No case tracking events are documented in the log.      Pain/Fab Score: Fab Score: 10 (10/7/2022 12:22 PM)

## 2022-10-10 LAB
DHEA SERPL-MCNC: NORMAL
ESTROGEN SERPL-MCNC: NORMAL PG/ML
INSULIN SERPL-ACNC: NORMAL U[IU]/ML
LAB AP GROSS DESCRIPTION: NORMAL
LAB AP LABORATORY NOTES: NORMAL
T3RU NFR SERPL: NORMAL %

## 2022-10-13 ENCOUNTER — HOSPITAL ENCOUNTER (OUTPATIENT)
Dept: RADIOLOGY | Facility: HOSPITAL | Age: 77
Discharge: HOME | End: 2022-10-13
Attending: INTERNAL MEDICINE
Payer: MEDICARE

## 2022-10-13 DIAGNOSIS — R14.0 BLOATING: ICD-10-CM

## 2022-10-13 PROCEDURE — A9541 TC99M SULFUR COLLOID: HCPCS

## 2022-10-13 PROCEDURE — 78264 NM GASTRIC EMPTYING: ICD-10-PCS | Mod: 26,,, | Performed by: RADIOLOGY

## 2022-10-13 PROCEDURE — 78264 GASTRIC EMPTYING IMG STUDY: CPT | Mod: 26,,, | Performed by: RADIOLOGY

## 2022-10-18 ENCOUNTER — OFFICE VISIT (OUTPATIENT)
Dept: GASTROENTEROLOGY | Facility: CLINIC | Age: 77
End: 2022-10-18
Payer: MEDICARE

## 2022-10-18 VITALS
HEART RATE: 72 BPM | OXYGEN SATURATION: 100 % | RESPIRATION RATE: 18 BRPM | BODY MASS INDEX: 25.47 KG/M2 | WEIGHT: 187.81 LBS | DIASTOLIC BLOOD PRESSURE: 66 MMHG | SYSTOLIC BLOOD PRESSURE: 138 MMHG

## 2022-10-18 DIAGNOSIS — R13.19 ESOPHAGEAL DYSPHAGIA: Primary | ICD-10-CM

## 2022-10-18 DIAGNOSIS — K59.00 CONSTIPATION, UNSPECIFIED CONSTIPATION TYPE: ICD-10-CM

## 2022-10-18 DIAGNOSIS — K21.9 GASTROESOPHAGEAL REFLUX DISEASE, UNSPECIFIED WHETHER ESOPHAGITIS PRESENT: ICD-10-CM

## 2022-10-18 DIAGNOSIS — E78.5 HYPERLIPIDEMIA, UNSPECIFIED HYPERLIPIDEMIA TYPE: Primary | Chronic | ICD-10-CM

## 2022-10-18 DIAGNOSIS — Z79.899 ENCOUNTER FOR LONG-TERM (CURRENT) USE OF OTHER MEDICATIONS: ICD-10-CM

## 2022-10-18 PROCEDURE — 99214 OFFICE O/P EST MOD 30 MIN: CPT | Mod: ,,, | Performed by: NURSE PRACTITIONER

## 2022-10-18 PROCEDURE — 99214 PR OFFICE/OUTPT VISIT, EST, LEVL IV, 30-39 MIN: ICD-10-PCS | Mod: ,,, | Performed by: NURSE PRACTITIONER

## 2022-10-18 NOTE — PROGRESS NOTES
Gris Hobbs is a 76 y.o. male here for No chief complaint on file.        PCP: Yobany Marsh  Referring Provider: No referring provider defined for this encounter.     HPI:  Presents for follow up after EGD/Dilation 10/7/22, negative H pylori, mild chronic gastritis, intestinal metaplasia. Will need to repeat in 3 years. Dysphagia is somewhat improved. He is taking a PPI. GES reviewed. Time to half is 93.75 minutes. Did discuss that small frequent meals and avoiding raw fruits and vegetables may improved his symptoms. Last colonoscopy 9/21/20, tubular adenoma x 2. States that he is taking the Miralax powder in the am but that this is causing him to get up around 3 am for a BM.         ROS:  Review of Systems   Constitutional:  Negative for activity change, appetite change, fatigue, fever and unexpected weight change.   HENT:  Positive for voice change. Negative for trouble swallowing (improved).    Respiratory:  Negative for cough.    Cardiovascular:  Negative for chest pain.   Gastrointestinal:  Positive for constipation and reflux. Negative for abdominal distention, abdominal pain, blood in stool, change in bowel habit, diarrhea, nausea, vomiting and change in bowel habit.   Musculoskeletal:  Negative for gait problem.   Integumentary:  Negative for color change.   Neurological:  Negative for dizziness.   Psychiatric/Behavioral:  Negative for sleep disturbance. The patient is not nervous/anxious.         PMHX:  has a past medical history of Atrial fibrillation, Coronary artery disease involving native coronary artery of native heart without angina pectoris, Gout, Hyperlipidemia, Hypertension, Ischemic cardiomyopathy, and NSTEMI (non-ST elevated myocardial infarction) (10/2019).    PSHX:  has a past surgical history that includes Appendectomy; Biopsy with transrectal ultrasound (TRUS) guidance; and Cardiac catheterization.    PFHX: family history includes Cancer in his father; Colon cancer in his  father; Heart attack in his mother.    PSlHX:  reports that he has never smoked. He has never used smokeless tobacco. He reports that he does not drink alcohol and does not use drugs.        Review of patient's allergies indicates:   Allergen Reactions    Pcn [penicillins]        Medication List with Changes/Refills   Current Medications    ALLOPURINOL (ZYLOPRIM) 300 MG TABLET    Take 1 tablet (300 mg total) by mouth once daily.    CLOPIDOGREL (PLAVIX) 75 MG TABLET    Take 1 tablet (75 mg total) by mouth once daily.    DILTIAZEM (CARDIZEM CD) 240 MG 24 HR CAPSULE    Take 1 capsule (240 mg total) by mouth once daily.    EZETIMIBE (ZETIA) 10 MG TABLET    Take 1 tablet (10 mg total) by mouth once daily.    METOPROLOL SUCCINATE (TOPROL-XL) 50 MG 24 HR TABLET    Take 1 tablet (50 mg total) by mouth once daily.    OMEPRAZOLE (PRILOSEC) 20 MG CAPSULE    Take 20 mg by mouth 2 (two) times a day.    ROSUVASTATIN (CRESTOR) 5 MG TABLET    Take 1 tablet (5 mg total) by mouth once daily.        Objective Findings:  Vital Signs:  /66   Pulse 72   Resp 18   Wt 85.2 kg (187 lb 12.8 oz)   SpO2 100%   BMI 25.47 kg/m²  Body mass index is 25.47 kg/m².    Physical Exam:  Physical Exam  Vitals and nursing note reviewed.   Constitutional:       General: He is not in acute distress.     Appearance: Normal appearance.   HENT:      Mouth/Throat:      Mouth: Mucous membranes are moist.   Cardiovascular:      Rate and Rhythm: Normal rate.   Pulmonary:      Effort: Pulmonary effort is normal.      Breath sounds: No wheezing, rhonchi or rales.   Abdominal:      General: Bowel sounds are normal. There is no distension.      Palpations: Abdomen is soft. There is no mass.      Tenderness: There is no abdominal tenderness. There is no guarding or rebound.      Hernia: No hernia is present.   Skin:     General: Skin is warm and dry.      Coloration: Skin is not jaundiced or pale.      Findings: No bruising.   Neurological:      Mental  Status: He is alert and oriented to person, place, and time.   Psychiatric:         Mood and Affect: Mood normal.        Labs:  Lab Results   Component Value Date    WBC 6.59 09/01/2022    HGB 15.1 09/01/2022    HCT 46.0 09/01/2022    MCV 94.7 09/01/2022    RDW 14.4 09/01/2022     09/01/2022    LYMPH 19.1 (L) 09/01/2022    LYMPH 1.26 09/01/2022    MONO 9.4 (H) 09/01/2022    EOS 0.15 09/01/2022    BASO 0.04 09/01/2022     Lab Results   Component Value Date     09/01/2022    K 5.7 (H) 09/01/2022     09/01/2022    CO2 27 09/01/2022     (H) 09/01/2022    BUN 14 09/01/2022    CREATININE 0.97 09/01/2022    CALCIUM 9.8 09/01/2022    PROT 7.4 09/01/2022    ALBUMIN 4.3 09/01/2022    BILITOT 1.0 09/01/2022    ALKPHOS 80 09/01/2022    AST 21 09/01/2022    ALT 25 09/01/2022         Imaging: NM Gastric Emptying    Result Date: 10/13/2022  EXAMINATION: NM GASTRIC EMPTYING CLINICAL HISTORY: bloating; Abdominal distension (gaseous) COMPARISON: None. FINDINGS: Patient ingested 500 microcuries of technetium 99 M sulfur colloid in semi-solid meal. Time to half gastric emptying 93.75 minutes No abnormality on the images.     Gastric emptying time as described above. Electronically signed by: Cesar Aleman Date:    10/13/2022 Time:    10:44    EGD w Dilation    Result Date: 10/7/2022  Table formatting from the original result was not included. Procedure Date 10/7/22 Impression Overall      Mucosa of the esophagus was normal. The distal esophagus was biopsied and the esophagus was dilated with a 48F Cheung. The stomach has erythema without ulcers, biopsies were obtained. Mucosa of the duodenum had white material present, so biopsies were obtained. Recommendation  Await pathology results Avoid nsaids; schedule gastric emptying study for bloating, then return to GI clinic. Consider trial of Creon before meals, if symptoms persist. Repeat esophageal dilation prn. Indication Bloating, Gastroesophageal reflux  disease, unspecified whether esophagitis present Providers Shelley Thakur Technician DEVON Laws CRNA, RN Registered Nurse Chioma Moran, RN Nurse Pavan Cerrato MD Proceduralist Medications Moderate sedation administered by anesthesia staff - See anesthesia record. Preprocedure A history and physical has been performed, and patient medication allergies have been reviewed. The patient's tolerance of previous anesthesia has been reviewed. The risks and benefits of the procedure and the sedation options and risks were discussed with the patient. All questions were answered and informed consent obtained. ASA Score: ASA 2 - Patient with mild systemic disease with no functional limitations Mallampati Airway Score: II (hard and soft palate, upper portion of tonsils anduvula visible) Details of the Procedure The patient underwent monitored anesthesia care, which was administered by an anesthesia professional. The patient's blood pressure, heart rate, level of consciousness, oxygen, respirations, ECG and ETCO2 were monitored throughout the procedure. The scope was introduced through the mouth and advanced to the third part of the duodenum. Retroflexion was performed in the cardia. Prior to the procedure, the patient's H. Pylori status was unknown. The patient's estimated blood loss was minimal (<5 mL). The procedure was not difficult. The patient tolerated the procedure well. There were no apparent complications. Scope: Gastroscope Scope Serial: [id number] Events Procedure Events Event Event Time Procedure Events Event Event Time SCOPE IN 10/7/2022 11:58 AM SCOPE OUT 10/7/2022 12:03 PM Findings Erythematous mucosa in the fundus of the stomach and antrum; performed cold forceps biopsy Dilated in the esophagus with Cheung dilator         Assessment:  Gris Hobbs is a 76 y.o. male here with:  1. Esophageal dysphagia    2. Gastroesophageal reflux disease, unspecified whether esophagitis present     3. Constipation, unspecified constipation type          Recommendations:  1. Continue PPI  2. Do not lay down within 3 hours of eating.  Avoid spicy, greasy foods  Eat 6-8 small meals per day  Avoid raw fruits and vegetables  Small amount of protein and fiber at one time  3. Miralax powder 17 grams in 8 ounces of liquid, take at dinner      Follow up in about 4 months (around 2/18/2023).      Order summary:       Thank you for allowing me to participate in the care of Gris Hobbs.      EVELYN Hamlin

## 2022-10-18 NOTE — PATIENT INSTRUCTIONS
Do not lay down within 3 hours of eating.  Avoid spicy, greasy foods  Eat 6-8 small meals per day.  Avoid raw fruits and vegetables  Small amount of protein and fiber at one time

## 2022-10-19 ENCOUNTER — TELEPHONE (OUTPATIENT)
Dept: GASTROENTEROLOGY | Facility: CLINIC | Age: 77
End: 2022-10-19
Payer: MEDICARE

## 2022-10-19 DIAGNOSIS — K29.40 ATROPHIC GASTRITIS, PRESENCE OF BLEEDING UNSPECIFIED: Primary | ICD-10-CM

## 2022-10-19 NOTE — TELEPHONE ENCOUNTER
Called patient to discuss results and verbalized understanding.      ----- Message from Pavan Cerrato MD sent at 10/10/2022  3:12 PM CDT -----  Place pt on list for egd in 3 years, biopsies showed gastritis with metaplasia.  Please order parietal cell antibody, intrinsic factor antibody and serum gastrin to evaluate autoimmune gastritis.  ----- Message -----  From: Background User Lab  Sent: 10/10/2022   2:29 PM CDT  To: Pavan Cerrato MD

## 2022-10-21 PROCEDURE — 86340 MAYO GENERIC ORDERABLE: ICD-10-PCS | Mod: 90,,, | Performed by: CLINICAL MEDICAL LABORATORY

## 2022-10-21 PROCEDURE — 86340 INTRINSIC FACTOR ANTIBODY: CPT | Mod: 90,,, | Performed by: CLINICAL MEDICAL LABORATORY

## 2022-10-22 LAB — MAYO GENERIC ORDERABLE RESULT: ABNORMAL

## 2022-10-23 DIAGNOSIS — K29.40 ATROPHIC GASTRITIS WITHOUT HEMORRHAGE: Primary | ICD-10-CM

## 2022-10-23 DIAGNOSIS — K90.49 MALABSORPTION DUE TO INTOLERANCE, NOT ELSEWHERE CLASSIFIED: ICD-10-CM

## 2022-10-24 ENCOUNTER — TELEPHONE (OUTPATIENT)
Dept: GASTROENTEROLOGY | Facility: CLINIC | Age: 77
End: 2022-10-24
Payer: MEDICARE

## 2022-10-24 DIAGNOSIS — K29.40 ATROPHIC GASTRITIS, PRESENCE OF BLEEDING UNSPECIFIED: Primary | ICD-10-CM

## 2022-10-24 DIAGNOSIS — E53.8 B12 DEFICIENCY: ICD-10-CM

## 2022-10-24 NOTE — TELEPHONE ENCOUNTER
Called patient to discuss results and verbalized understanding.      ----- Message from Pavan Cerrato MD sent at 10/23/2022 12:09 PM CDT -----  Anti-intrinsic factor antibody is positive. This is consistent with autoimmune gastritis. Please ask pt to go by lab for cbc, b12 level. Thanks.  ----- Message -----  From: Background User Lab  Sent: 10/22/2022  10:30 AM CDT  To: Pavan Cerrato MD

## 2022-10-24 NOTE — TELEPHONE ENCOUNTER
Called patient to discuss results and verbalized understanding.      ----- Message from Pavan Cerrato MD sent at 10/17/2022  9:32 AM CDT -----  Let  know that his stomach took approximately 1 1/2 hours to empty half of it's contents. He may benefit from small, frequent, low residue meals with vegetables cooked soft.  ----- Message -----  From: Jeremy, Rad Results In  Sent: 10/13/2022  10:47 AM CDT  To: Pavan Cerrato MD

## 2022-10-25 DIAGNOSIS — R10.9 ABDOMINAL PAIN, UNSPECIFIED ABDOMINAL LOCATION: Primary | ICD-10-CM

## 2022-10-26 ENCOUNTER — TELEPHONE (OUTPATIENT)
Dept: GASTROENTEROLOGY | Facility: CLINIC | Age: 77
End: 2022-10-26
Payer: MEDICARE

## 2022-10-26 NOTE — TELEPHONE ENCOUNTER
Attempted to call patient about results and left  for a call back.      ----- Message from Pavan Cerrato MD sent at 10/25/2022 12:30 PM CDT -----  Vitamin B12 level is low. Send prescription for Vitamin B12 1cc  (1000mcg)IM weekly x  1 month, then 1cc IM monthly and let pt know that he'll need to stay on Vitamin b12.  ----- Message -----  From: Background User Lab  Sent: 10/25/2022  10:34 AM CDT  To: Pavan Cerrato MD

## 2022-10-27 RX ORDER — CYANOCOBALAMIN 1000 UG/ML
1000 INJECTION, SOLUTION INTRAMUSCULAR; SUBCUTANEOUS WEEKLY
Qty: 10 ML | Refills: 11 | Status: SHIPPED | OUTPATIENT
Start: 2022-10-27 | End: 2022-11-18

## 2022-10-27 RX ORDER — CYANOCOBALAMIN 1000 UG/ML
1000 INJECTION, SOLUTION INTRAMUSCULAR; SUBCUTANEOUS
Qty: 1 ML | Refills: 4 | Status: SHIPPED | OUTPATIENT
Start: 2022-10-27 | End: 2023-11-16 | Stop reason: SDUPTHER

## 2022-10-27 NOTE — TELEPHONE ENCOUNTER
----- Message from Pavan Cerrato MD sent at 10/25/2022 12:30 PM CDT -----  Vitamin B12 level is low. Send prescription for Vitamin B12 1cc  (1000mcg)IM weekly x  1 month, then 1cc IM monthly and let pt know that he'll need to stay on Vitamin b12.  ----- Message -----  From: Background User Lab  Sent: 10/25/2022  10:34 AM CDT  To: Pavan Cerrato MD

## 2022-11-02 ENCOUNTER — HOSPITAL ENCOUNTER (OUTPATIENT)
Dept: RADIOLOGY | Facility: HOSPITAL | Age: 77
Discharge: HOME | End: 2022-11-02
Attending: FAMILY MEDICINE
Payer: MEDICARE

## 2022-11-02 DIAGNOSIS — R10.9 ABDOMINAL PAIN, UNSPECIFIED ABDOMINAL LOCATION: ICD-10-CM

## 2022-11-02 PROCEDURE — 76705 US ABDOMEN LIMITED_GALLBLADDER: ICD-10-PCS | Mod: 26,,, | Performed by: RADIOLOGY

## 2022-11-02 PROCEDURE — 76705 ECHO EXAM OF ABDOMEN: CPT | Mod: TC

## 2022-11-02 PROCEDURE — 76705 ECHO EXAM OF ABDOMEN: CPT | Mod: 26,,, | Performed by: RADIOLOGY

## 2022-11-22 DIAGNOSIS — R10.13 EPIGASTRIC PAIN: Primary | ICD-10-CM

## 2022-11-30 ENCOUNTER — TELEPHONE (OUTPATIENT)
Dept: GASTROENTEROLOGY | Facility: CLINIC | Age: 77
End: 2022-11-30
Payer: MEDICARE

## 2022-11-30 ENCOUNTER — OFFICE VISIT (OUTPATIENT)
Dept: GASTROENTEROLOGY | Facility: CLINIC | Age: 77
End: 2022-11-30
Payer: MEDICARE

## 2022-11-30 VITALS
HEART RATE: 77 BPM | BODY MASS INDEX: 25.55 KG/M2 | SYSTOLIC BLOOD PRESSURE: 130 MMHG | DIASTOLIC BLOOD PRESSURE: 79 MMHG | OXYGEN SATURATION: 100 % | HEIGHT: 72 IN | WEIGHT: 188.63 LBS

## 2022-11-30 DIAGNOSIS — K59.04 CHRONIC IDIOPATHIC CONSTIPATION: ICD-10-CM

## 2022-11-30 DIAGNOSIS — E53.8 LOW SERUM VITAMIN B12: Primary | ICD-10-CM

## 2022-11-30 DIAGNOSIS — R14.0 ABDOMINAL BLOATING: ICD-10-CM

## 2022-11-30 DIAGNOSIS — K31.A0 GASTRIC INTESTINAL METAPLASIA: ICD-10-CM

## 2022-11-30 DIAGNOSIS — K29.40 ATROPHIC GASTRITIS WITHOUT HEMORRHAGE: ICD-10-CM

## 2022-11-30 PROCEDURE — 99214 OFFICE O/P EST MOD 30 MIN: CPT | Mod: ,,, | Performed by: NURSE PRACTITIONER

## 2022-11-30 PROCEDURE — 99214 PR OFFICE/OUTPT VISIT, EST, LEVL IV, 30-39 MIN: ICD-10-PCS | Mod: ,,, | Performed by: NURSE PRACTITIONER

## 2022-11-30 NOTE — TELEPHONE ENCOUNTER
Results and recommendations called to patient. Verbalized understanding.              ----- Message from EDER Rowe sent at 11/30/2022  1:23 PM CST -----  B12 is much improved and actually even a little high. Continue B12 monthly injection at same dose for life.

## 2022-11-30 NOTE — PROGRESS NOTES
Pt is a 78 y/o WM here for f/u of GERD. Dysphagia resolved after EGD in Oct. Has cut from BID to daily omeprazole and reports symptoms are controlled. He reports abdominal bloating is controlled. D/w pt trial of Creon if bloating returns.  U/s normal. HIDA pending by PCP. No abd pain or N/V.    EGD path:  A. Duodenum, biopsy:  - Duodenal mucosa without diagnostic abnormality  - No evidence of gluten-sensitive enteropathy     B. Stomach, biopsy:  - Antral-type mucosa with mild chronic gastritis, mild reactive gastropathy, and intestinal metaplasia (see comment)  - Negative for dysplasia  - Helicobacter pylori immunohistochemistry is negative    C.  Esophagus, biopsy:  - Squamous mucosa without diagnostic abnormality  - No intraepithelial eosinophils are identified      B):  One fragment of submitted tissue is largely negative for gastrin by immunohistochemistry suggesting the possibility of pyloric metaplasia of the gastric body.  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  Environmental metaplastic gastritis is also a diagnostic possibility.  Chromogranin demonstrates mild linear and micronodular enterochromaffin-like (ECL) cell hyperplasia.  Clinical, laboratory, and endoscopic correlation is suggested    Parietal cell Ab 23.5, positive intrinsic factor done by Dr. Cerrato. C/w autoimmune gastritis. B12 low - 82. Has been on monthly 1000 mcg/1cc B12 injections. Last injection 2 weeks ago.     H/o constipation. Daily Miralax causing diarrhea.      Past Medical History:   Diagnosis Date    Atrial fibrillation     Coronary artery disease involving native coronary artery of native heart without angina pectoris     Gout     Hyperlipidemia     Hypertension     Ischemic cardiomyopathy     NSTEMI (non-ST elevated  myocardial infarction) 10/2019     Review of Systems   Constitutional:  Negative for chills and fever.   Respiratory:  Negative for shortness of breath.    Cardiovascular:  Negative for chest pain.   Gastrointestinal:  Positive for heartburn. Negative for abdominal pain, blood in stool, constipation, diarrhea, melena, nausea and vomiting.   Skin:  Negative for rash.   Neurological:  Negative for weakness.     Physical Exam  Vitals reviewed. Exam conducted with a chaperone present.   Constitutional:       Appearance: Normal appearance.   HENT:      Head: Normocephalic.   Eyes:      General: No scleral icterus.     Pupils: Pupils are equal, round, and reactive to light.   Cardiovascular:      Rate and Rhythm: Normal rate.      Pulses: Normal pulses.   Pulmonary:      Effort: Pulmonary effort is normal.   Abdominal:      General: Abdomen is flat. There is no distension.      Palpations: Abdomen is soft.      Tenderness: There is no abdominal tenderness. There is no guarding or rebound.   Musculoskeletal:         General: No swelling.   Skin:     General: Skin is warm and dry.      Coloration: Skin is not jaundiced.   Neurological:      General: No focal deficit present.      Mental Status: He is alert and oriented to person, place, and time.   Psychiatric:         Mood and Affect: Mood normal.         Behavior: Behavior normal.         Thought Content: Thought content normal.         Judgment: Judgment normal.       Plan  -antireflux measures  -omeprazole 20 mg daily  -Miralax PRN constipation - d/w pt not having to do daily since giving diarrhea/urgency during night  -B12/folate recheck today  -cont 1cc B12 monthly  -trial of Creon if bloating returns, pt wishes to wait for now, will call back if bloating returns  -repeat EGD 3 years for GIM/autoimmune gastritis  -RTC 3 months, sooner PRN

## 2022-12-06 RX ORDER — METOPROLOL SUCCINATE 50 MG/1
50 TABLET, EXTENDED RELEASE ORAL DAILY
Qty: 90 TABLET | Refills: 1 | Status: SHIPPED | OUTPATIENT
Start: 2022-12-06 | End: 2023-06-19 | Stop reason: SDUPTHER

## 2022-12-13 ENCOUNTER — HOSPITAL ENCOUNTER (OUTPATIENT)
Dept: RADIOLOGY | Facility: HOSPITAL | Age: 77
Discharge: HOME | End: 2022-12-13
Attending: FAMILY MEDICINE
Payer: MEDICARE

## 2022-12-13 DIAGNOSIS — R10.13 EPIGASTRIC PAIN: ICD-10-CM

## 2022-12-13 PROCEDURE — 78227 NM HEPATOBILIARY(HIDA) WITH PHARM AND EF: ICD-10-PCS | Mod: 26,,, | Performed by: RADIOLOGY

## 2022-12-13 PROCEDURE — A9537 TC99M MEBROFENIN: HCPCS

## 2022-12-13 PROCEDURE — 78227 HEPATOBIL SYST IMAGE W/DRUG: CPT | Mod: 26,,, | Performed by: RADIOLOGY

## 2022-12-16 ENCOUNTER — OFFICE VISIT (OUTPATIENT)
Dept: SURGERY | Facility: CLINIC | Age: 77
End: 2022-12-16
Attending: SURGERY
Payer: MEDICARE

## 2022-12-16 ENCOUNTER — CLINICAL SUPPORT (OUTPATIENT)
Dept: CARDIOLOGY | Facility: CLINIC | Age: 77
End: 2022-12-16
Attending: SURGERY
Payer: MEDICARE

## 2022-12-16 DIAGNOSIS — K82.8 BILIARY DYSKINESIA: Primary | ICD-10-CM

## 2022-12-16 DIAGNOSIS — I25.10 CORONARY ARTERY DISEASE INVOLVING NATIVE CORONARY ARTERY OF NATIVE HEART WITHOUT ANGINA PECTORIS: Chronic | ICD-10-CM

## 2022-12-16 DIAGNOSIS — K82.8 BILIARY DYSKINESIA: ICD-10-CM

## 2022-12-16 PROCEDURE — 99212 OFFICE O/P EST SF 10 MIN: CPT | Mod: PBBFAC

## 2022-12-16 PROCEDURE — 93005 ELECTROCARDIOGRAM TRACING: CPT | Mod: PBBFAC | Performed by: INTERNAL MEDICINE

## 2022-12-16 PROCEDURE — 99205 PR OFFICE/OUTPT VISIT, NEW, LEVL V, 60-74 MIN: ICD-10-PCS | Mod: S$PBB,,, | Performed by: SURGERY

## 2022-12-16 PROCEDURE — 99214 OFFICE O/P EST MOD 30 MIN: CPT | Mod: PBBFAC | Performed by: SURGERY

## 2022-12-16 PROCEDURE — 93010 EKG 12-LEAD: ICD-10-PCS | Mod: S$PBB,,, | Performed by: INTERNAL MEDICINE

## 2022-12-16 PROCEDURE — 99205 OFFICE O/P NEW HI 60 MIN: CPT | Mod: S$PBB,,, | Performed by: SURGERY

## 2022-12-16 PROCEDURE — 93010 ELECTROCARDIOGRAM REPORT: CPT | Mod: S$PBB,,, | Performed by: INTERNAL MEDICINE

## 2022-12-16 NOTE — ASSESSMENT & PLAN NOTE
Discussed risks and benefits of operation to include infection, bleeding, issues with his heart due to his atrial fibrillation previously placed stents, and the need to hold anticoagulation, hernia, duct injury, open conversion and the unforeseen.  He understands and wished to proceed.

## 2022-12-16 NOTE — ASSESSMENT & PLAN NOTE
We will touch base with Dr. Bae and Trish Delarosa regarding holding his antiplatelet agents.  With his atrial fibrillation, I am uncertain whether or not he might need an anticoagulant perioperatively.  Defer this decision to Cardiology.

## 2022-12-16 NOTE — H&P (VIEW-ONLY)
Subjective:       Patient ID: Gris Hobbs is a 77 y.o. male.    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  He also describes reflux symptoms that he relates as dysfunction of his lower esophageal sphincter.  He has had a workup to include an EGD, abdominal ultrasound, and HIDA scan.  His EGD an ultrasound were fairly unremarkable, but the HIDA scan revealed an ejection fraction of 10%.  The patient has had colonoscopy in the past and for the most part they have been normal.  He relates polyps on 1 colonoscopy, but that was several years ago.  He denies symptoms of melena though he does occasionally have constipation.  The patient is referred to us by his brother, Dr. Nahum Hobbs, for evaluation and management and consideration of cholecystectomy.    Abdominal Pain    family history includes Cancer in his father; Colon cancer in his father; Heart attack in his mother.  Past Medical History:   Diagnosis Date    Atrial fibrillation     Coronary artery disease involving native coronary artery of native heart without angina pectoris     Gout     Hyperlipidemia     Hypertension     Ischemic cardiomyopathy     NSTEMI (non-ST elevated myocardial infarction) 10/2019      Past Surgical History:   Procedure Laterality Date    APPENDECTOMY      BIOPSY WITH TRANSRECTAL ULTRASOUND (TRUS) GUIDANCE      CARDIAC CATHETERIZATION         reports that he has never smoked. He has never used smokeless tobacco. He reports that he does not drink alcohol and does not use drugs.     Review of Systems   Gastrointestinal:  Positive for abdominal pain.   All other systems reviewed and are negative.       Objective:      There were no vitals taken for this visit.   Physical Exam  Cardiovascular:      Rate and Rhythm: Normal rate and regular rhythm.       Pulses: Normal pulses.      Heart sounds: No murmur heard.  Pulmonary:      Effort: Pulmonary effort is normal. No respiratory distress.   Abdominal:      Palpations: Abdomen is soft.      Tenderness: There is no abdominal tenderness.   Musculoskeletal:         General: No swelling or tenderness.      Cervical back: No tenderness.   Skin:     Coloration: Skin is not jaundiced.   Neurological:      General: No focal deficit present.      Mental Status: He is alert.       Assessment/Plan:         Biliary dyskinesia  -     CBC Auto Differential; Future; Expected date: 12/16/2022  -     Comprehensive Metabolic Panel; Future; Expected date: 12/16/2022  -     EKG 12-lead; Future; Expected date: 12/16/2022  -     Case Request Operating Room: CHOLECYSTECTOMY, LAPAROSCOPIC VS OPEN    Coronary artery disease involving native coronary artery of native heart without angina pectoris         Problem List Items Addressed This Visit          Cardiac/Vascular    Coronary artery disease involving native coronary artery of native heart without angina pectoris (Chronic)    Overview     s/p NSTEMI 10/19         Current Assessment & Plan     We will touch base with Dr. Bae and Trish Delarosa regarding holding his antiplatelet agents.  With his atrial fibrillation, I am uncertain whether or not he might need an anticoagulant perioperatively.  Defer this decision to Cardiology.            GI    Biliary dyskinesia - Primary    Current Assessment & Plan     Discussed risks and benefits of operation to include infection, bleeding, issues with his heart due to his atrial fibrillation previously placed stents, and the need to hold anticoagulation, hernia, duct injury, open conversion and the unforeseen.  He understands and wished to proceed.         Relevant Orders    CBC Auto Differential    Comprehensive Metabolic Panel    EKG 12-lead    Case Request Operating Room: CHOLECYSTECTOMY, LAPAROSCOPIC VS OPEN (Completed)

## 2022-12-16 NOTE — PATIENT INSTRUCTIONS
[facility]                                                                                                                                                                                                                                                                                                                                                                                                                                                                         Preoperative Instructions        Your pre-op lab work will be on today on the 1st floor of the [facility] building.  EKG is located on 2nd floor of [facility].                                                                                       Day of Surgery      Your surgery is scheduled for 1/4/23 at [facility] on the ground floor of the Ambulatory building.     Your arrival time is 5:30.              DO NOT EAT OR DRINK ANYTHING AFTER MIDNIGHT.          You may take blood pressure medication with a small drink of water the morning of surgery.                                 DO NOT TAKE INSULIN OR ANY OTHER BLOOD SUGAR MEDICATIONS.           The following blood sugar medications have to be stopped 48 hours prior to surgery:                    Metformin        Glucovance          Metaglip             Fortamet           Glucophage                   Riomet             Avandamet          Glimepiride              IF YOU ARE ON ANY OF THESE BLOOD THINNERS, MAKE SURE YOUR PHYSICIAN IS AWARE.                Eliquis/Apixaban             Xarelto/Rivaroxaban             Plavix/Clopidogrel                  Wafarin/Coumadin,Jantoven           Pletal/Cilostazol              Pradaxa/Dibigatran                           PLEASE USE CHLORHEXIDINE WASH  THE NIGHT BEFORE SURGERY AND THE MORNING OF SURGERY.             YOU CANNOT DRIVE YOURSELF HOME FROM THE HOSPITAL THE DAY OF SURGERY.        Please have a  with you.           Bring all medications, that you are currently taking, with you to the hospital the morning of your procedure.           Please leave all valuables at home.          Children under the age of 18 must be accompanied by an adult.          PLEASE UNDERSTAND THAT OUR OFFICE DOES NOT GIVE PATHOLOGY RESULTS OR TEST RESULTS OVER THE PHONE.         THIS WILL BE DISCUSSED WITH YOU ON YOUR FOLLOW UP APPOINTMENT TO DISCUSS IN PERSON.

## 2023-01-04 ENCOUNTER — ANESTHESIA EVENT (OUTPATIENT)
Dept: SURGERY | Facility: HOSPITAL | Age: 78
End: 2023-01-04
Payer: MEDICARE

## 2023-01-04 ENCOUNTER — HOSPITAL ENCOUNTER (OUTPATIENT)
Facility: HOSPITAL | Age: 78
Discharge: HOME | End: 2023-01-04
Attending: SURGERY | Admitting: SURGERY
Payer: MEDICARE

## 2023-01-04 ENCOUNTER — ANESTHESIA (OUTPATIENT)
Dept: SURGERY | Facility: HOSPITAL | Age: 78
End: 2023-01-04
Payer: MEDICARE

## 2023-01-04 VITALS
OXYGEN SATURATION: 97 % | SYSTOLIC BLOOD PRESSURE: 145 MMHG | RESPIRATION RATE: 16 BRPM | DIASTOLIC BLOOD PRESSURE: 73 MMHG | TEMPERATURE: 98 F | HEIGHT: 72 IN | WEIGHT: 189 LBS | HEART RATE: 76 BPM | BODY MASS INDEX: 25.6 KG/M2

## 2023-01-04 DIAGNOSIS — K82.8 BILIARY DYSKINESIA: Primary | ICD-10-CM

## 2023-01-04 PROCEDURE — D9220A PRA ANESTHESIA: Mod: ANES,,, | Performed by: ANESTHESIOLOGY

## 2023-01-04 PROCEDURE — 37000009 HC ANESTHESIA EA ADD 15 MINS: Performed by: SURGERY

## 2023-01-04 PROCEDURE — 25000003 PHARM REV CODE 250: Performed by: NURSE ANESTHETIST, CERTIFIED REGISTERED

## 2023-01-04 PROCEDURE — 47562 PR LAP,CHOLECYSTECTOMY: ICD-10-PCS | Mod: ,,, | Performed by: SURGERY

## 2023-01-04 PROCEDURE — 27201423 OPTIME MED/SURG SUP & DEVICES STERILE SUPPLY: Performed by: SURGERY

## 2023-01-04 PROCEDURE — 47562 LAPAROSCOPIC CHOLECYSTECTOMY: CPT | Mod: ,,, | Performed by: SURGERY

## 2023-01-04 PROCEDURE — 37000008 HC ANESTHESIA 1ST 15 MINUTES: Performed by: SURGERY

## 2023-01-04 PROCEDURE — 36000708 HC OR TIME LEV III 1ST 15 MIN: Performed by: SURGERY

## 2023-01-04 PROCEDURE — 88304 TISSUE EXAM BY PATHOLOGIST: CPT | Mod: TC,SUR | Performed by: SURGERY

## 2023-01-04 PROCEDURE — 88304 TISSUE EXAM BY PATHOLOGIST: CPT | Mod: 26,,, | Performed by: PATHOLOGY

## 2023-01-04 PROCEDURE — 71000033 HC RECOVERY, INTIAL HOUR: Performed by: SURGERY

## 2023-01-04 PROCEDURE — D9220A PRA ANESTHESIA: Mod: CRNA,,, | Performed by: NURSE ANESTHETIST, CERTIFIED REGISTERED

## 2023-01-04 PROCEDURE — D9220A PRA ANESTHESIA: ICD-10-PCS | Mod: CRNA,,, | Performed by: NURSE ANESTHETIST, CERTIFIED REGISTERED

## 2023-01-04 PROCEDURE — 25000003 PHARM REV CODE 250: Performed by: SURGERY

## 2023-01-04 PROCEDURE — 71000016 HC POSTOP RECOV ADDL HR: Performed by: SURGERY

## 2023-01-04 PROCEDURE — 71000015 HC POSTOP RECOV 1ST HR: Performed by: SURGERY

## 2023-01-04 PROCEDURE — D9220A PRA ANESTHESIA: ICD-10-PCS | Mod: ANES,,, | Performed by: ANESTHESIOLOGY

## 2023-01-04 PROCEDURE — 88304 SURGICAL PATHOLOGY: ICD-10-PCS | Mod: 26,,, | Performed by: PATHOLOGY

## 2023-01-04 PROCEDURE — 36000709 HC OR TIME LEV III EA ADD 15 MIN: Performed by: SURGERY

## 2023-01-04 PROCEDURE — 63600175 PHARM REV CODE 636 W HCPCS: Performed by: NURSE ANESTHETIST, CERTIFIED REGISTERED

## 2023-01-04 RX ORDER — OXYCODONE HYDROCHLORIDE 5 MG/1
5 TABLET ORAL
Status: DISCONTINUED | OUTPATIENT
Start: 2023-01-04 | End: 2023-01-04 | Stop reason: HOSPADM

## 2023-01-04 RX ORDER — ONDANSETRON 2 MG/ML
4 INJECTION INTRAMUSCULAR; INTRAVENOUS DAILY PRN
Status: DISCONTINUED | OUTPATIENT
Start: 2023-01-04 | End: 2023-01-04 | Stop reason: HOSPADM

## 2023-01-04 RX ORDER — HYDROMORPHONE HYDROCHLORIDE 2 MG/ML
0.5 INJECTION, SOLUTION INTRAMUSCULAR; INTRAVENOUS; SUBCUTANEOUS EVERY 5 MIN PRN
Status: DISCONTINUED | OUTPATIENT
Start: 2023-01-04 | End: 2023-01-04 | Stop reason: HOSPADM

## 2023-01-04 RX ORDER — SODIUM CHLORIDE 9 MG/ML
INJECTION, SOLUTION INTRAVENOUS CONTINUOUS
Status: DISCONTINUED | OUTPATIENT
Start: 2023-01-04 | End: 2023-01-04 | Stop reason: HOSPADM

## 2023-01-04 RX ORDER — CLOPIDOGREL BISULFATE 75 MG/1
75 TABLET ORAL DAILY
Qty: 90 TABLET | Refills: 1 | Status: SHIPPED | OUTPATIENT
Start: 2023-01-06 | End: 2023-07-05 | Stop reason: SDUPTHER

## 2023-01-04 RX ORDER — SODIUM CHLORIDE, SODIUM LACTATE, POTASSIUM CHLORIDE, CALCIUM CHLORIDE 600; 310; 30; 20 MG/100ML; MG/100ML; MG/100ML; MG/100ML
125 INJECTION, SOLUTION INTRAVENOUS CONTINUOUS
Status: DISCONTINUED | OUTPATIENT
Start: 2023-01-04 | End: 2023-01-04 | Stop reason: HOSPADM

## 2023-01-04 RX ORDER — HYDROCODONE BITARTRATE AND ACETAMINOPHEN 7.5; 325 MG/1; MG/1
1 TABLET ORAL EVERY 6 HOURS PRN
Qty: 24 TABLET | Refills: 0 | Status: SHIPPED | OUTPATIENT
Start: 2023-01-04

## 2023-01-04 RX ORDER — MORPHINE SULFATE 10 MG/ML
4 INJECTION INTRAMUSCULAR; INTRAVENOUS; SUBCUTANEOUS EVERY 5 MIN PRN
Status: DISCONTINUED | OUTPATIENT
Start: 2023-01-04 | End: 2023-01-04 | Stop reason: HOSPADM

## 2023-01-04 RX ORDER — FENTANYL CITRATE 50 UG/ML
INJECTION, SOLUTION INTRAMUSCULAR; INTRAVENOUS
Status: DISCONTINUED | OUTPATIENT
Start: 2023-01-04 | End: 2023-01-04

## 2023-01-04 RX ORDER — DEXAMETHASONE SODIUM PHOSPHATE 4 MG/ML
INJECTION, SOLUTION INTRA-ARTICULAR; INTRALESIONAL; INTRAMUSCULAR; INTRAVENOUS; SOFT TISSUE
Status: DISCONTINUED | OUTPATIENT
Start: 2023-01-04 | End: 2023-01-04

## 2023-01-04 RX ORDER — ONDANSETRON 2 MG/ML
INJECTION INTRAMUSCULAR; INTRAVENOUS
Status: DISCONTINUED | OUTPATIENT
Start: 2023-01-04 | End: 2023-01-04

## 2023-01-04 RX ORDER — DIPHENHYDRAMINE HYDROCHLORIDE 50 MG/ML
25 INJECTION INTRAMUSCULAR; INTRAVENOUS EVERY 6 HOURS PRN
Status: DISCONTINUED | OUTPATIENT
Start: 2023-01-04 | End: 2023-01-04 | Stop reason: HOSPADM

## 2023-01-04 RX ORDER — MEPERIDINE HYDROCHLORIDE 25 MG/ML
25 INJECTION INTRAMUSCULAR; INTRAVENOUS; SUBCUTANEOUS EVERY 10 MIN PRN
Status: DISCONTINUED | OUTPATIENT
Start: 2023-01-04 | End: 2023-01-04 | Stop reason: HOSPADM

## 2023-01-04 RX ORDER — ROCURONIUM BROMIDE 10 MG/ML
INJECTION, SOLUTION INTRAVENOUS
Status: DISCONTINUED | OUTPATIENT
Start: 2023-01-04 | End: 2023-01-04

## 2023-01-04 RX ORDER — LIDOCAINE HYDROCHLORIDE 20 MG/ML
INJECTION, SOLUTION EPIDURAL; INFILTRATION; INTRACAUDAL; PERINEURAL
Status: DISCONTINUED | OUTPATIENT
Start: 2023-01-04 | End: 2023-01-04

## 2023-01-04 RX ORDER — KETOROLAC TROMETHAMINE 30 MG/ML
INJECTION, SOLUTION INTRAMUSCULAR; INTRAVENOUS
Status: DISCONTINUED | OUTPATIENT
Start: 2023-01-04 | End: 2023-01-04

## 2023-01-04 RX ORDER — PROPOFOL 10 MG/ML
VIAL (ML) INTRAVENOUS
Status: DISCONTINUED | OUTPATIENT
Start: 2023-01-04 | End: 2023-01-04

## 2023-01-04 RX ORDER — BUPIVACAINE HYDROCHLORIDE 2.5 MG/ML
INJECTION, SOLUTION EPIDURAL; INFILTRATION; INTRACAUDAL
Status: DISCONTINUED | OUTPATIENT
Start: 2023-01-04 | End: 2023-01-04 | Stop reason: HOSPADM

## 2023-01-04 RX ADMIN — ONDANSETRON 4 MG: 2 INJECTION INTRAMUSCULAR; INTRAVENOUS at 10:01

## 2023-01-04 RX ADMIN — SODIUM CHLORIDE, POTASSIUM CHLORIDE, SODIUM LACTATE AND CALCIUM CHLORIDE: 600; 310; 30; 20 INJECTION, SOLUTION INTRAVENOUS at 10:01

## 2023-01-04 RX ADMIN — SODIUM CHLORIDE, POTASSIUM CHLORIDE, SODIUM LACTATE AND CALCIUM CHLORIDE: 600; 310; 30; 20 INJECTION, SOLUTION INTRAVENOUS at 11:01

## 2023-01-04 RX ADMIN — LIDOCAINE HYDROCHLORIDE 50 MG: 20 INJECTION, SOLUTION EPIDURAL; INFILTRATION; INTRACAUDAL; PERINEURAL at 10:01

## 2023-01-04 RX ADMIN — CEFAZOLIN 2 G: 1 INJECTION, POWDER, FOR SOLUTION INTRAMUSCULAR; INTRAVENOUS; PARENTERAL at 10:01

## 2023-01-04 RX ADMIN — PROPOFOL 120 MG: 10 INJECTION, EMULSION INTRAVENOUS at 10:01

## 2023-01-04 RX ADMIN — DEXAMETHASONE SODIUM PHOSPHATE 8 MG: 4 INJECTION, SOLUTION INTRA-ARTICULAR; INTRALESIONAL; INTRAMUSCULAR; INTRAVENOUS; SOFT TISSUE at 10:01

## 2023-01-04 RX ADMIN — SUGAMMADEX 200 MG: 100 INJECTION, SOLUTION INTRAVENOUS at 11:01

## 2023-01-04 RX ADMIN — ROCURONIUM BROMIDE 30 MG: 10 INJECTION, SOLUTION INTRAVENOUS at 10:01

## 2023-01-04 RX ADMIN — FENTANYL CITRATE 100 MCG: 50 INJECTION INTRAMUSCULAR; INTRAVENOUS at 10:01

## 2023-01-04 RX ADMIN — KETOROLAC TROMETHAMINE 30 MG: 30 INJECTION, SOLUTION INTRAMUSCULAR at 10:01

## 2023-01-04 NOTE — ANESTHESIA PREPROCEDURE EVALUATION
01/04/2023  Gris Hobbs is a 77 y.o., male.      Pre-op Assessment    I have reviewed the Patient Summary Reports.     I have reviewed the Nursing Notes. I have reviewed the NPO Status.   I have reviewed the Medications.     Review of Systems  Anesthesia Hx:  No problems with previous Anesthesia    Social:  Non-Smoker, No Alcohol Use    Hematology/Oncology:  Hematology Normal   Oncology Normal   Hematology Comments: plavix    EENT/Dental:EENT/Dental Normal   Cardiovascular:   Hypertension Past MI CAD  Dysrhythmias atrial fibrillation AF    ISCHEMIC CARDIOMYOPATHY (cleared per Bae)    Stents x3, plavix   Pulmonary:  Pulmonary Normal    Renal/:  Renal/ Normal     Hepatic/GI:   GERD    Musculoskeletal:  Musculoskeletal Normal    Neurological:  Neurology Normal    Endocrine:  Endocrine Normal    Dermatological:  Skin Normal    Psych:  Psychiatric Normal           Physical Exam  General: Well nourished    Airway:  Mallampati: II / II  Mouth Opening: Normal  TM Distance: > 6 cm  Tongue: Normal  Neck ROM: Normal ROM    Chest/Lungs:  Clear to auscultation, Normal Respiratory Rate    Heart:  Rate: Normal  Rhythm: Regular Rhythm        Anesthesia Plan  Type of Anesthesia, risks & benefits discussed:    Anesthesia Type: Gen ETT  Intra-op Monitoring Plan: Standard ASA Monitors  Post Op Pain Control Plan: multimodal analgesia  Induction:  IV  Informed Consent: Informed consent signed with the Patient and all parties understand the risks and agree with anesthesia plan.  All questions answered. Patient consented to blood products? Yes  ASA Score: 3  Day of Surgery Review of History & Physical: H&P Update referred to the surgeon/provider.I have interviewed and examined the patient. I have reviewed the patient's H&P dated: There are no significant changes. H&P completed by Anesthesiologist.    Ready For  Surgery From Anesthesia Perspective.     .

## 2023-01-04 NOTE — TRANSFER OF CARE
Anesthesia Transfer of Care Note    Patient: Gris Hobbs    Procedure(s) Performed: Procedure(s) (LRB):  CHOLECYSTECTOMY, LAPAROSCOPIC VS OPEN (N/A)    Patient location: PACU    Anesthesia Type: general    Transport from OR: Transported from OR on 6-10 L/min O2 by face mask with adequate spontaneous ventilation    Post pain: adequate analgesia    Post assessment: no apparent anesthetic complications    Post vital signs: stable    Level of consciousness: responds to stimulation and awake    Nausea/Vomiting: no nausea/vomiting    Complications: none    Transfer of care protocol was followedComments: Good SV continue, NAD noted, VSS, RTRN      Last vitals:   Visit Vitals  /70 (BP Location: Left arm)   Pulse 68   Temp 36.7 °C (98 °F)   Resp 14   Ht 6' (1.829 m)   Wt 85.7 kg (189 lb)   SpO2 100%   BMI 25.63 kg/m²

## 2023-01-04 NOTE — OP NOTE
[facility] Periop Services     Operative Note    SUMMARY     Date of Procedure: 1/4/2023     Procedure: Procedure(s) (LRB):  CHOLECYSTECTOMY, LAPAROSCOPIC     Surgeon(s) and Role:     * Pablito Feliciano MD - Primary    Assisting Surgeon: None    Pre-Operative Diagnosis: Biliary dyskinesia [K82.8]    Post-Operative Diagnosis: Post-Op Diagnosis Codes:     * Biliary dyskinesia [K82.8]    Anesthesia: General    Technical Procedures Used: The patient was brought to the operating room and placed in supine position. General anesthesia was administered. The abdomen was prepped and draped in standard fashion. An infraumbilical incision was performed and dissection was carried down through subcutaneous tissue bluntly. The fascia was sharply transected and the peritoneum was carefully entered. CO2 pneumoperitoneum was established after inserting Dougie cannula. 5 mm trochars were then placed in the epigastrium, midepigastrium, and right upper quadrant. The fundus was retracted upward and over the liver, and the infundibulum was retracted laterally. The cystic duct and cystic artery were dissected out, clipped twice proximally, once distally and transected. Cautery was then used to dissect the gallbladder free from the surface of the liver.  There was good hemostasis. Port sites were then infiltrated with local at the level of the fascia and peritoneum. The gallbladder was removed using the push technique. Ports were then removed and CO2 pneumoperitoneum was allowed to escape. The fascia of the infraumbilical incision was closed using interrupted PDS. All port sites closed skin level with subcuticular vicryl. Patient was awakened from anesthesia in stable condition.       Description of the Findings of the Procedure: Adhesions in the area of the duct, but eventually able to categorically confirm the duct.     Assistant(s): CHARLIE Lira    Complications: No    Estimated Blood Loss (EBL): 10cc           Implants:  * No implants in log *    Specimens:   Specimen (24h ago, onward)       Start     Ordered    01/04/23 1104  Surgical Pathology  RELEASE UPON ORDERING         01/04/23 1104                     Condition: Good      Complications:  None

## 2023-01-04 NOTE — ANESTHESIA POSTPROCEDURE EVALUATION
Anesthesia Post Evaluation    Patient: Gris Hobbs    Procedure(s) Performed: Procedure(s) (LRB):  CHOLECYSTECTOMY, LAPAROSCOPIC VS OPEN (N/A)    Final Anesthesia Type: general      Patient location during evaluation: PACU  Patient participation: Yes- Able to Participate  Level of consciousness: awake and sedated  Post-procedure vital signs: reviewed and stable  Pain management: adequate  Airway patency: patent    PONV status at discharge: No PONV  Anesthetic complications: no      Cardiovascular status: blood pressure returned to baseline  Respiratory status: unassisted  Hydration status: euvolemic  Follow-up not needed.          Vitals Value Taken Time   /73 01/04/23 1316   Temp 36.7 °C (98 °F) 01/04/23 1154   Pulse 76 01/04/23 1330   Resp 16 01/04/23 1230   SpO2 97 % 01/04/23 1330   Vitals shown include unvalidated device data.      Event Time   Out of Recovery 12:20:00         Pain/Fab Score: Fab Score: 10 (1/4/2023 12:30 PM)  Modified Fab Score: 19 (1/4/2023 12:30 PM)

## 2023-01-04 NOTE — ANESTHESIA PROCEDURE NOTES
Intubation    Date/Time: 1/4/2023 10:30 AM  Performed by: Matty Boss CRNA  Authorized by: Darius Bonilla MD     Intubation:     Induction:  Intravenous    Intubated:  Postinduction    Mask Ventilation:  Easy mask    Attempts:  1    Attempted By:  CRNA    Method of Intubation:  Direct    Blade:  Mon 2    Laryngeal View Grade: Grade I - full view of cords      Difficult Airway Encountered?: No      Complications:  None    Airway Device:  Oral endotracheal tube    Airway Device Size:  7.5    Style/Cuff Inflation:  Cuffed (inflated to minimal occlusive pressure)    Placement Verified By:  Capnometry    Complicating Factors:  None    Findings Post-Intubation:  BS equal bilateral

## 2023-01-04 NOTE — OR NURSING
1150 Rec'd pt to pacu asleep. NAD noted. Resp even & unlabored. O2 sat 100% on 5l/fm w/ oral airway in place. Will remove and titrate as needed. VSS. No c/o pain voiced at this time. Abd dsg x3 C/D/I, drainage noted x1; area marked. RFA 20G intact w/ ivf infusing; no signs of infiltration noted. Will cont to monitor.     1200 Oral airway removed. O2 5l/fm; sat 100%    1205 Sat 100% on RA    1210 Updated wife on pt status via text message.     1230 Returned pt to room #6 & released to CHANTAL Mojica RN awake in stable condition. /80 HR 76 O2 sat 98% on RA.

## 2023-01-05 LAB
ESTROGEN SERPL-MCNC: NORMAL PG/ML
INSULIN SERPL-ACNC: NORMAL U[IU]/ML
LAB AP GROSS DESCRIPTION: NORMAL
LAB AP LABORATORY NOTES: NORMAL
T3RU NFR SERPL: NORMAL %

## 2023-01-05 NOTE — DISCHARGE SUMMARY
[facility]  Discharge Note  Short Stay    Procedure(s) (LRB):  CHOLECYSTECTOMY, LAPAROSCOPIC (N/A)      OUTCOME: Patient tolerated treatment/procedure well without complication and is now ready for discharge.    DISPOSITION: Home or Self Care    FINAL DIAGNOSIS:  Biliary dyskinesia    FOLLOWUP: In clinic    DISCHARGE INSTRUCTIONS:    Discharge Procedure Orders   Diet Adult Regular     Remove dressing in 48 hours     Lifting restrictions     Shower on day dressing removed (No bath)         Clinical Reference Documents Added to Patient Instructions         Document    CHOLECYSTECTOMY DISCHARGE INSTRUCTIONS (ENGLISH)            TIME SPENT ON DISCHARGE: 10 minutes

## 2023-01-17 ENCOUNTER — OFFICE VISIT (OUTPATIENT)
Dept: SURGERY | Facility: CLINIC | Age: 78
End: 2023-01-17
Attending: SURGERY
Payer: MEDICARE

## 2023-01-17 VITALS
TEMPERATURE: 98 F | HEIGHT: 72 IN | BODY MASS INDEX: 23.7 KG/M2 | DIASTOLIC BLOOD PRESSURE: 83 MMHG | OXYGEN SATURATION: 99 % | RESPIRATION RATE: 18 BRPM | SYSTOLIC BLOOD PRESSURE: 155 MMHG | HEART RATE: 85 BPM | WEIGHT: 175 LBS

## 2023-01-17 DIAGNOSIS — Z09 POSTOP CHECK: Primary | ICD-10-CM

## 2023-01-17 PROCEDURE — 99213 OFFICE O/P EST LOW 20 MIN: CPT | Mod: PBBFAC | Performed by: SURGERY

## 2023-01-17 PROCEDURE — 99024 PR POST-OP FOLLOW-UP VISIT: ICD-10-PCS | Mod: ,,, | Performed by: SURGERY

## 2023-01-17 PROCEDURE — 99024 POSTOP FOLLOW-UP VISIT: CPT | Mod: ,,, | Performed by: SURGERY

## 2023-01-17 NOTE — PROGRESS NOTES
Patient returns for follow-up exam after having lap wilian.  He reports that he is eating normally and not having any pain or dysphagia symptoms.  His only complaint is ecchymosis, adjacent to some of the incisions.  He also states  that he has noticed a slight bulge in the left flank area lately.    On exam, there is ecchymosis near the midepigastric incision.  There was no evidence of hernia.  Over the left flank, there is weakness of the musculature but no actual hernia.    Follow-up is p.r.n..

## 2023-02-17 ENCOUNTER — OFFICE VISIT (OUTPATIENT)
Dept: DERMATOLOGY | Facility: CLINIC | Age: 78
End: 2023-02-17
Payer: MEDICARE

## 2023-02-17 DIAGNOSIS — L57.0 ACTINIC KERATOSES: ICD-10-CM

## 2023-02-17 DIAGNOSIS — Z85.828 HISTORY OF NONMELANOMA SKIN CANCER: ICD-10-CM

## 2023-02-17 DIAGNOSIS — D48.9 NEOPLASM OF UNCERTAIN BEHAVIOR: Primary | ICD-10-CM

## 2023-02-17 PROCEDURE — 17004 PR DESTRUCTION, PREMALIGNANT LESIONS; 15 OR MORE LESIONS: ICD-10-PCS | Mod: ,,, | Performed by: STUDENT IN AN ORGANIZED HEALTH CARE EDUCATION/TRAINING PROGRAM

## 2023-02-17 PROCEDURE — 88341 PATHOLOGY, DERMATOLOGY: ICD-10-PCS | Mod: 26,,, | Performed by: PATHOLOGY

## 2023-02-17 PROCEDURE — 17004 DESTROY PREMAL LESIONS 15/>: CPT | Mod: ,,, | Performed by: STUDENT IN AN ORGANIZED HEALTH CARE EDUCATION/TRAINING PROGRAM

## 2023-02-17 PROCEDURE — 99203 PR OFFICE/OUTPT VISIT, NEW, LEVL III, 30-44 MIN: ICD-10-PCS | Mod: 25,,, | Performed by: STUDENT IN AN ORGANIZED HEALTH CARE EDUCATION/TRAINING PROGRAM

## 2023-02-17 PROCEDURE — 88342 IMHCHEM/IMCYTCHM 1ST ANTB: CPT | Mod: 26,,, | Performed by: PATHOLOGY

## 2023-02-17 PROCEDURE — 88305 TISSUE EXAM BY PATHOLOGIST: CPT | Mod: TC,SUR | Performed by: STUDENT IN AN ORGANIZED HEALTH CARE EDUCATION/TRAINING PROGRAM

## 2023-02-17 PROCEDURE — 11102 TANGNTL BX SKIN SINGLE LES: CPT | Mod: XS,,, | Performed by: STUDENT IN AN ORGANIZED HEALTH CARE EDUCATION/TRAINING PROGRAM

## 2023-02-17 PROCEDURE — 11103 PR TANGENTIAL BIOPSY, SKIN, EA ADDTL LESION: ICD-10-PCS | Mod: ,,, | Performed by: STUDENT IN AN ORGANIZED HEALTH CARE EDUCATION/TRAINING PROGRAM

## 2023-02-17 PROCEDURE — 99203 OFFICE O/P NEW LOW 30 MIN: CPT | Mod: 25,,, | Performed by: STUDENT IN AN ORGANIZED HEALTH CARE EDUCATION/TRAINING PROGRAM

## 2023-02-17 PROCEDURE — 88305 PATHOLOGY, DERMATOLOGY: ICD-10-PCS | Mod: 26,,, | Performed by: PATHOLOGY

## 2023-02-17 PROCEDURE — 88341 IMHCHEM/IMCYTCHM EA ADD ANTB: CPT | Mod: 26,,, | Performed by: PATHOLOGY

## 2023-02-17 PROCEDURE — 11103 TANGNTL BX SKIN EA SEP/ADDL: CPT | Mod: ,,, | Performed by: STUDENT IN AN ORGANIZED HEALTH CARE EDUCATION/TRAINING PROGRAM

## 2023-02-17 PROCEDURE — 88342 PATHOLOGY, DERMATOLOGY: ICD-10-PCS | Mod: 26,,, | Performed by: PATHOLOGY

## 2023-02-17 PROCEDURE — 11102 PR TANGENTIAL BIOPSY, SKIN, SINGLE LESION: ICD-10-PCS | Mod: XS,,, | Performed by: STUDENT IN AN ORGANIZED HEALTH CARE EDUCATION/TRAINING PROGRAM

## 2023-02-17 PROCEDURE — 88305 TISSUE EXAM BY PATHOLOGIST: CPT | Mod: 26,,, | Performed by: PATHOLOGY

## 2023-02-17 NOTE — PROGRESS NOTES
[facility]  Thomas Lubin MD    [address] [address]  [phone]    Fax: [phone]    Patient Name: Gris Hobbs  Medical Record Number: [id number]  PCP: Yobany Marsh MD  Age: 77 y.o. : 1945  Contact: [phone] (home)     CC: lesion on scalp  History of Present Illness:     Gris Hobbs is a 77 y.o.  male with history of skin cancer (patient unsure what type of skin cancer s/p excision on scalp with Dr. Dodge 1 year ago) who presents to clinic today for lesion on scalp.  This has been present for 1 month. Symptoms include irritation . Previous treatments include none. Other concerns today are lesion on arm.       The patient has no other concerns today.    Review of Systems:     Unremarkable other than mentioned above.     Physical Exam:     General: Relaxed, oriented, alert    Skin examination of the scalp, face, neck, chest, back, abdomen, upper extremities and lower extremities were normal except for as listed below              Assessment and Plan:     1. History of NMSC   Well-healed scar on scalp  No e/o recurrence   Recommend sunscreen and good photoprotection       2. Actinic Keratoses  Erythematous, scaly papules on bilateral arms, bilateral hands, scalp, right ear, left temple, left ear, left shoulder, back    Plan: Liquid Nitrogen.  A total of 16 lesions were treated with liquid nitrogen for 2 freeze-thaw cycles lasting 5 seconds, located on the above locations.   The patient's consent was obtained including but not limited to risks of crusting, scabbing,  blistering, scarring, darker or lighter pigmentary change, recurrence, incomplete removal and infection.    Counseling.  Sun protective clothing and broad spectrum sunscreen can prevent the formation of AK.   AKs can be treated with cryotherapy, photodynamic therapy, imiquimod, topical 5-FU.  Actinic Keratoses are precancerous proliferations that occur within sun damaged  skin. If untreated,  a small subset of AKs can develop into Squamous Cell Carcinoma.      3. Neoplasm of Uncertain Behavior x 2  A) hyperkeratotic papule located on the frontal scalp  Ddx includes: AK vs SCC    B) friable plaque located on the right forearm  Ddx includes: SCC vs BCC vs melanoma      Shave biopsy  x 2    Pre-procedure Diagnosis: as above  Post_procedure Diagnosis: same  Estimated Blood Loss: <1cc    Findings: None  Complications: None  Specimens: to pathology      Written informed consent was obtained after discussing risks including pain, bleeding, infection, recurrence and scarring. The biopsy site was sterilely prepped with alcohol, which was allowed to dry completely, then locally infiltrated with 1% lidocaine with epinephrine, ~3 cc total. A shave biopsy was obtained using a Dermablade/15 and the specimen was sent to dermatopathology. Aluminum chloride was used for hemostasis. Antibiotic ointment and a clean dressing were applied. The patient tolerated the procedure well without complications. Verbal and written wound care instructions were given.    Thomas Lubin MD       Return to clinic in 4 months.    AVS printed with patient instructions     Thomas Lubin MD   Mohs Surgery/Dermatologic Oncology  Dermatology

## 2023-02-21 LAB
DHEA SERPL-MCNC: NORMAL
ESTROGEN SERPL-MCNC: NORMAL PG/ML
INSULIN SERPL-ACNC: NORMAL U[IU]/ML
LAB AP GROSS DESCRIPTION: NORMAL
LAB AP LABORATORY NOTES: NORMAL
LAB AP SPEC A DDX: NORMAL
LAB AP SPEC A MORPHOLOGY: NORMAL
LAB AP SPEC A PROCEDURE: NORMAL
LAB AP SPEC B DDX: NORMAL
LAB AP SPEC B MORPHOLOGY: NORMAL
LAB AP SPEC B PROCEDURE: NORMAL
T3RU NFR SERPL: NORMAL %

## 2023-02-27 ENCOUNTER — PROCEDURE VISIT (OUTPATIENT)
Dept: DERMATOLOGY | Facility: CLINIC | Age: 78
End: 2023-02-27
Payer: MEDICARE

## 2023-02-27 VITALS — DIASTOLIC BLOOD PRESSURE: 83 MMHG | SYSTOLIC BLOOD PRESSURE: 145 MMHG | HEART RATE: 63 BPM

## 2023-02-27 DIAGNOSIS — D04.4 SQUAMOUS CELL CARCINOMA IN SITU (SCCIS) OF SCALP: Primary | ICD-10-CM

## 2023-02-27 PROCEDURE — 17312 MOHS ADDL STAGE: CPT | Mod: ,,, | Performed by: STUDENT IN AN ORGANIZED HEALTH CARE EDUCATION/TRAINING PROGRAM

## 2023-02-27 PROCEDURE — 99499 NO LOS: ICD-10-PCS | Mod: ,,, | Performed by: STUDENT IN AN ORGANIZED HEALTH CARE EDUCATION/TRAINING PROGRAM

## 2023-02-27 PROCEDURE — 17312: ICD-10-PCS | Mod: ,,, | Performed by: STUDENT IN AN ORGANIZED HEALTH CARE EDUCATION/TRAINING PROGRAM

## 2023-02-27 PROCEDURE — 17311 MOHS 1 STAGE H/N/HF/G: CPT | Mod: 79,,, | Performed by: STUDENT IN AN ORGANIZED HEALTH CARE EDUCATION/TRAINING PROGRAM

## 2023-02-27 PROCEDURE — 99499 UNLISTED E&M SERVICE: CPT | Mod: ,,, | Performed by: STUDENT IN AN ORGANIZED HEALTH CARE EDUCATION/TRAINING PROGRAM

## 2023-02-27 PROCEDURE — 17311: ICD-10-PCS | Mod: 79,,, | Performed by: STUDENT IN AN ORGANIZED HEALTH CARE EDUCATION/TRAINING PROGRAM

## 2023-02-27 RX ORDER — MUPIROCIN 20 MG/G
OINTMENT TOPICAL DAILY
Qty: 22 G | Refills: 5 | Status: SHIPPED | OUTPATIENT
Start: 2023-02-27

## 2023-02-27 NOTE — PATIENT INSTRUCTIONS

## 2023-02-27 NOTE — PROGRESS NOTES
Mohs Surgery Operative Note    Patient name: Gris Hobbs  Date: 02/27/2023    Mohs accession #:   Previous dermpath accession #: [id number]  Location: left frontal scalp  Preop diagnosis:SCCIS  Postop diagnosis: Same  Mohs AUC score: 9  Number of stages: 3  Preop size: 2.5 x 1.5 cm  Postop size: 3.5 x 3.4 cm  Depth of defect: fat  Repair type: second intent    Surgeon and Pathologist: HOLLIE Lubin MD     Indications for Mohs Surgery    Removal of the patient's tumor is complicated by the following clinical features: Clinical area critical for tissue conservation (Area M: cheeks, forehead, scalp, neck, jawline, pretibial surface), Large tumor size, and Poorly-defined clinical tumor borders    Based on my medical judgement, Mohs surgery is the most appropriate treatment for this cancer compared to other treatments. I discussed alternative treatments to Mohs surgery and specifically discussed the risks and benefits of curettage, excision with permanent sections, and foregoing treatment. The rationale for Mohs was explained to the patient and consent was obtained. The risks, benefits and alternatives to therapy were discussed in detail. Specifically, the risks of infection, scarring, bleeding, prolonged wound healing, incomplete removal, allergy to anesthesia, nerve injury and recurrence were addressed. Prior to the procedure, the treatment site was clearly identified and confirmed by the patient. All components of Universal Protocol/PAUSE Rule completed. NISH Lubin MD operated in two distinct and integrated capacities as the surgeon and pathologist.    STAGE I:  The patient was placed on the operating table. The cancer was identified and outlined. The entire surgical field was prepped with chlorhexidine The surgical site was anesthetized using Lidocaine 1% with epinephrine 1:100,000 buffered with sodium bicarbonate 8.4% in a 1:10 ratio.    The area of clinically apparent tumor was debulked with a 2 mm  curette. The layer of tissue was then surgically excised using a #15 blade and was then transferred onto a specimen sheet maintaining the orientation of the specimen. Hemostasis was obtained using monopolar electrodesiccation. The wound site was then covered with a dressing while the tissue samples were processed for examination.    The specimen was oriented, mapped and divided. Each section was then inked and processed in the Mohs lab using the Mohs protocol and submitted for frozen section. The histopathologic sections were reviewed by the surgeon in conjunction with the reference map.     Total blocks: 1 Total slides: 6    Frozen section analysis revealed: residual tumor present on stage 1. Tumor was indicated in red on the reference map.    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 Using a similar technique to that described above, a thin layer of tissue was removed from all areas where tumor was visible on the previous stage. The tissue was again oriented, mapped, dyed, and processed as above. Histopathologic sections were reviewed in conjunction with the reference map.     Total blocks: 1 Total slides: 3    Residual tumor was identified and indicated in red on the reference map, identifying the location where further tissue excision was necessary.  Therefore, an additional stage of Mohs Micrographic surgery was deemed necessary. Tumor characteristics were the same as the first stage.    STAGE III:  The patient was prepped in the same fashion as the first stage. Using a similar technique to that described above, a thin layer of tissue was removed from all areas where tumor was  visible on the previous stage. The tissue was again oriented, mapped, dyed, and processed as above. Histopathologic sections were reviewed in conjunction with the reference map.     Total blocks: 1 Total slides: 1    Frozen section analysis revealed: No additional tumor identified on microscopic examination by the surgeon. Histology: No malignant cells seen in the sections examined.    Additional Histologic Findings: None    REPAIR: Secondary Intention  The patient is status-post Mohs micrographic surgery. The surgical site was examined with attention to normal anatomic and functional relationships. After consideration and discussion of multiple options with the patient, it was determined that healing by secondary intention would offer the best chance for preservation/restoration of all normal anatomic and functional relationships. The patient verbalized understanding and agreed with this plan. It is also understood that should second intention healing be sub-optimal, additional procedures such as scar revision, steroid injection or dermabrasion may be recommended.  The open wound was cleaned and a thick layer of vaseline was applied.  A pressure dressing consisting of non-adherent gauze, gauze, and hypafix was applied. Wound care was discussed with the patient both orally and in writing. The patient stated understanding and agreement with the course of care.      Thomas Lubin MD   Mohs Surgery/Dermatologic Oncology

## 2023-02-27 NOTE — PROGRESS NOTES
Mohs Surgery Consult Note    Gris Hobbs is a 77 y.o. male who is referred by Dr. Lubin for evaluation of a SCCIS  on the L frontal scalp.     Recurrent skin cancer: No    Preoperative Risk Factors:  Current Anticoagulants: Yes Plavix, pt stopped 7 days ago.   Endocarditis / Rheumatic Fever hx: No  Immunocompromised: No  Prosthetic joint: No  Congenital heart defect: No  Prosthetic heart valve: No  Diabetic: No  Transplant: No  Pacemaker: No  Defibrillator:  No  Prior problem with local anesthesia: No  Tobacco History: No]  Clindamycin Allergy: No  Pregnant: no     Transmissible Diseases:  HIV No  Hepatitis B or C  No      Exam:  Limited skin exam is normal except for a  SCCIS  located on the L frontal scalp  .    Pathologic Findings:  Accession # [id number]  Diagnosis: SCCIS    Assessment and Plan:  Treatment Options : The various treatment options for skin cancer removal were reviewed with the patient in detail. These include Mohs surgery with its high cure rate, excisional surgery, destructive treatment, radiation therapy, and various topical therapies.  Given the indications and high cure rate, the patient has agreed to proceed with Mohs.  Risks and Benefits : The rationale for Mohs was explained to the patient. The risks and benefits to therapy were discussed in detail. Specifically, the risks of infection, scarring, bleeding, dehiscence, hematoma, prolonged wound healing, incomplete removal, allergy to anesthesia, nerve injury, inability to clear the tumor, and recurrence were addressed. The treatment site was clearly identified and confirmed by the patient.    Plan:  Mohs Micrographic Surgery    Indication for Mohs: Clinical area critical for tissue conservation (Area M: cheeks, forehead, scalp, neck, jawline, pretibial surface), Large tumor size, and Poorly-defined clinical tumor borders  Mohs AUC Score: 9   Proposed closure: Second intent   Indication for antibiotics: Mupirocin ointment bid x 7 days        Thomas Lubin MD   Mohs Surgery/Dermatologic Oncology

## 2023-03-03 NOTE — PATIENT INSTRUCTIONS

## 2023-03-06 ENCOUNTER — PROCEDURE VISIT (OUTPATIENT)
Dept: DERMATOLOGY | Facility: CLINIC | Age: 78
End: 2023-03-06
Payer: MEDICARE

## 2023-03-06 VITALS — SYSTOLIC BLOOD PRESSURE: 153 MMHG | DIASTOLIC BLOOD PRESSURE: 74 MMHG

## 2023-03-06 DIAGNOSIS — C44.612 BCC (BASAL CELL CARCINOMA), ARM, RIGHT: Primary | ICD-10-CM

## 2023-03-06 PROCEDURE — 12034 INTMD RPR S/TR/EXT 7.6-12.5: CPT | Mod: 51,,, | Performed by: STUDENT IN AN ORGANIZED HEALTH CARE EDUCATION/TRAINING PROGRAM

## 2023-03-06 PROCEDURE — 12034 PR LAYR CLOS WND TRUNK,ARM,LEG 7.6-12.5 CM: ICD-10-PCS | Mod: 51,,, | Performed by: STUDENT IN AN ORGANIZED HEALTH CARE EDUCATION/TRAINING PROGRAM

## 2023-03-06 PROCEDURE — 17313 MOHS 1 STAGE T/A/L: CPT | Mod: ,,, | Performed by: STUDENT IN AN ORGANIZED HEALTH CARE EDUCATION/TRAINING PROGRAM

## 2023-03-06 PROCEDURE — 17313: ICD-10-PCS | Mod: ,,, | Performed by: STUDENT IN AN ORGANIZED HEALTH CARE EDUCATION/TRAINING PROGRAM

## 2023-03-06 PROCEDURE — 99499 UNLISTED E&M SERVICE: CPT | Mod: ,,, | Performed by: STUDENT IN AN ORGANIZED HEALTH CARE EDUCATION/TRAINING PROGRAM

## 2023-03-06 PROCEDURE — 99499 NO LOS: ICD-10-PCS | Mod: ,,, | Performed by: STUDENT IN AN ORGANIZED HEALTH CARE EDUCATION/TRAINING PROGRAM

## 2023-03-06 NOTE — PROGRESS NOTES
Mohs Surgery Operative Note    Patient name: Gris Hobbs  Date: 03/06/2023    Mohs accession #:   Previous dermpath accession #: [id number]  Location: right forearm  Preop diagnosis:BCC  Postop diagnosis: Same  Mohs AUC score: 8  Number of stages: 1  Preop size: 2.1 x 2.0 cm   Postop size: 2.6 x 2.6 cm   Depth of defect: fascia  Repair type: intermediate     Surgeon and Pathologist: HOLLIE Lubin MD     Indications for Mohs Surgery    Removal of the patient's tumor is complicated by the following clinical features: Large tumor size, Poorly-defined clinical tumor borders, and Clinically aggressive nature with rapid growth    Based on my medical judgement, Mohs surgery is the most appropriate treatment for this cancer compared to other treatments. I discussed alternative treatments to Mohs surgery and specifically discussed the risks and benefits of curettage, excision with permanent sections, and foregoing treatment. The rationale for Mohs was explained to the patient and consent was obtained. The risks, benefits and alternatives to therapy were discussed in detail. Specifically, the risks of infection, scarring, bleeding, prolonged wound healing, incomplete removal, allergy to anesthesia, nerve injury and recurrence were addressed. Prior to the procedure, the treatment site was clearly identified and confirmed by the patient. All components of Universal Protocol/PAUSE Rule completed. NISH Lubin MD operated in two distinct and integrated capacities as the surgeon and pathologist.    STAGE I:  The patient was placed on the operating table. The cancer was identified and outlined. The entire surgical field was prepped with chlorhexidine The surgical site was anesthetized using Lidocaine 1% with epinephrine 1:100,000 buffered with sodium bicarbonate 8.4% in a 1:10 ratio.    The area of clinically apparent tumor was debulked with a 2 mm curette. The layer of tissue was then surgically excised using a #15  blade and was then transferred onto a specimen sheet maintaining the orientation of the specimen. Hemostasis was obtained using monopolar electrodesiccation. The wound site was then covered with a dressing while the tissue samples were processed for examination.    The specimen was oriented, mapped and divided. Each section was then inked and processed in the Mohs lab using the Mohs protocol and submitted for frozen section. The histopathologic sections were reviewed by the surgeon in conjunction with the reference map.     Total blocks: 1 Total slides: 6    Frozen section analysis revealed: No additional tumor identified on microscopic examination by the surgeon. Histology: No malignant cells seen in the sections examined.    Additional Histologic Findings: none     REPAIR: Intermediate Closure    Primary Surgeon : HOLLIE Lubin MD  Repair Size: 7.9 cm  Sutures:  3-0 PDS; 4-0 prolene     The defect was identified and a marking pen was used to plan the repair. The area was infiltrated with Lidocaine 1% with epinephrine 1:100,000 buffered with sodium bicarbonate 8.4% in a 1:10 ratio, prepped with chlorhexidine and draped with sterile towels.  The wound was debeveled and undermined widely. Cones were excised within relaxed skin tension lines on both sides of the defect. Hemostasis was obtained using monopolar electrodesiccation. The dermis and subcutaneous tissue were then approximated using buried vertical mattress sutures. Percutaneous simple running sutures were carefully placed for maximum eversion and meticulous wound edge approximation. Careful attention was paid to avoid distorting any nearby free margins.  The wound was cleansed with saline and ointment was applied along the wound surface. A sterile pressure dressing was applied. Wound care instructions were given verbally and in writing. The patient left the operating suite in stable condition. Patient was informed that additional refinement of the resulting  surgical scar may be used as a second stage of this reconstruction.    Thomas Lubin MD   Mohs Surgery/Dermatologic Oncology

## 2023-03-06 NOTE — PROGRESS NOTES
Mohs Surgery Consult Note    Gris Hobbs is a 77 y.o. male who is referred by Dr. Lubin for evaluation of a BCC on the right forearm.     Recurrent skin cancer: No    Preoperative Risk Factors:  Current Anticoagulants: Yes Plavix  Endocarditis / Rheumatic Fever hx: No  Immunocompromised: No  Prosthetic joint: No  Congenital heart defect: No  Prosthetic heart valve: No  Diabetic: No  Transplant: No  Pacemaker: No  Defibrillator:  No  Prior problem with local anesthesia: No  Tobacco History: No]  Clindamycin Allergy: No  Pregnant: no      Transmissible Diseases:  HIV No  Hepatitis B or C  No      Exam:  Limited skin exam is normal except for a  BCC  located on the right forearm.    Pathologic Findings:  Accession # [id number]  Diagnosis: BCC    Assessment and Plan:  Treatment Options : The various treatment options for skin cancer removal were reviewed with the patient in detail. These include Mohs surgery with its high cure rate, excisional surgery, destructive treatment, radiation therapy, and various topical therapies.  Given the indications and high cure rate, the patient has agreed to proceed with Mohs.  Risks and Benefits : The rationale for Mohs was explained to the patient. The risks and benefits to therapy were discussed in detail. Specifically, the risks of infection, scarring, bleeding, dehiscence, hematoma, prolonged wound healing, incomplete removal, allergy to anesthesia, nerve injury, inability to clear the tumor, and recurrence were addressed. The treatment site was clearly identified and confirmed by the patient.    Plan:  Mohs Micrographic Surgery    Indication for Mohs: Large tumor size, Aggressive pattern on initial pathology , and Poorly-defined clinical tumor borders  Mohs AUC Score: 8   Proposed closure: Linear  Indication for antibiotics: Mupirocin ointment bid x 7 days       Thomas Lubin MD   Mohs Surgery/Dermatologic Oncology

## 2023-03-20 ENCOUNTER — CLINICAL SUPPORT (OUTPATIENT)
Dept: DERMATOLOGY | Facility: CLINIC | Age: 78
End: 2023-03-20
Payer: MEDICARE

## 2023-03-20 DIAGNOSIS — Z48.02 ENCOUNTER FOR REMOVAL OF SUTURES: Primary | ICD-10-CM

## 2023-03-20 DIAGNOSIS — L08.9 SKIN INFECTION: ICD-10-CM

## 2023-03-20 PROCEDURE — 99024 PR POST-OP FOLLOW-UP VISIT: ICD-10-PCS | Mod: ,,, | Performed by: STUDENT IN AN ORGANIZED HEALTH CARE EDUCATION/TRAINING PROGRAM

## 2023-03-20 PROCEDURE — 87070 CULTURE, WOUND: ICD-10-PCS | Mod: ,,, | Performed by: CLINICAL MEDICAL LABORATORY

## 2023-03-20 PROCEDURE — 99024 POSTOP FOLLOW-UP VISIT: CPT | Mod: ,,, | Performed by: STUDENT IN AN ORGANIZED HEALTH CARE EDUCATION/TRAINING PROGRAM

## 2023-03-20 PROCEDURE — 87070 CULTURE OTHR SPECIMN AEROBIC: CPT | Mod: ,,, | Performed by: CLINICAL MEDICAL LABORATORY

## 2023-03-20 NOTE — PROGRESS NOTES
Mohs accession #:   Previous dermpath accession #: [id number]  Location: right forearm  Preop diagnosis:BCC  Postop diagnosis: Same  Mohs AUC score: 8  Number of stages: 1  Preop size: 2.1 x 2.0 cm   Postop size: 2.6 x 2.6 cm   Depth of defect: fascia  Repair type: intermediate     Sutures removed with no complications   Wound culture obtained  RTC in 4 weeks for wound check of scalp  Olivia Walsh CMA

## 2023-03-21 ENCOUNTER — TELEPHONE (OUTPATIENT)
Dept: GASTROENTEROLOGY | Facility: CLINIC | Age: 78
End: 2023-03-21
Payer: MEDICARE

## 2023-03-21 ENCOUNTER — OFFICE VISIT (OUTPATIENT)
Dept: GASTROENTEROLOGY | Facility: CLINIC | Age: 78
End: 2023-03-21
Payer: MEDICARE

## 2023-03-21 VITALS
DIASTOLIC BLOOD PRESSURE: 91 MMHG | OXYGEN SATURATION: 100 % | BODY MASS INDEX: 25.08 KG/M2 | HEIGHT: 72 IN | WEIGHT: 185.19 LBS | SYSTOLIC BLOOD PRESSURE: 148 MMHG | HEART RATE: 84 BPM

## 2023-03-21 DIAGNOSIS — K59.00 CONSTIPATION, UNSPECIFIED CONSTIPATION TYPE: ICD-10-CM

## 2023-03-21 DIAGNOSIS — K30 DELAYED GASTRIC EMPTYING: ICD-10-CM

## 2023-03-21 DIAGNOSIS — K21.9 GASTROESOPHAGEAL REFLUX DISEASE, UNSPECIFIED WHETHER ESOPHAGITIS PRESENT: Primary | ICD-10-CM

## 2023-03-21 DIAGNOSIS — K29.40 ATROPHIC GASTRITIS WITHOUT HEMORRHAGE: ICD-10-CM

## 2023-03-21 DIAGNOSIS — E53.8 VITAMIN B 12 DEFICIENCY: ICD-10-CM

## 2023-03-21 PROCEDURE — 99214 PR OFFICE/OUTPT VISIT, EST, LEVL IV, 30-39 MIN: ICD-10-PCS | Mod: S$PBB,,, | Performed by: NURSE PRACTITIONER

## 2023-03-21 PROCEDURE — 99214 OFFICE O/P EST MOD 30 MIN: CPT | Mod: S$PBB,,, | Performed by: NURSE PRACTITIONER

## 2023-03-21 PROCEDURE — 99214 OFFICE O/P EST MOD 30 MIN: CPT | Mod: PBBFAC | Performed by: NURSE PRACTITIONER

## 2023-03-21 RX ORDER — PANTOPRAZOLE SODIUM 40 MG/1
40 TABLET, DELAYED RELEASE ORAL DAILY
Qty: 30 TABLET | Refills: 11 | Status: SHIPPED | OUTPATIENT
Start: 2023-03-21 | End: 2023-09-21 | Stop reason: SDUPTHER

## 2023-03-21 NOTE — PROGRESS NOTES
"    Gris Hobbs is a 77 y.o. male here for Follow-up        PCP: Yobany Marsh  Referring Provider: No referring provider defined for this encounter.     HPI:  Presents for three-month follow-up due to GERD and also delayed gastric emptying.  Since his last visit he did have a cholecystectomy on 12/16/2022.  He states that he has recovered well since the surgery.  His GI symptoms of reflux and epigastric discomfort have improved but have not resolved.  He does continue to have some belching.  States that he has noticed that his reflux is increased with certain foods. Sometimes feels that he has a "sour" stomach.  Last EGD dilation was on 10/07/2022, report reviewed, negative H pylori, mild chronic gastritis with intestinal metaplasia.  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  B12 level was checked and was 82.  He is now receiving B12 injections monthly.  He did have a gastric emptying study on 10/03/2022, report reviewed, GS is delayed.  We did discuss small frequent meals and avoidance of foods such as raw fruits and vegetables.  We will give him a written gastroparesis diet today to see if this improves his symptoms.  Last colonoscopy was 09/21/2020, report reviewed, tubular adenoma x2 removed.  He denies any hematochezia or melena today.  He was previously taking omeprazole 20 mg twice daily.  He is currently taking Plavix.  We will change him to Protonix 40 mg daily.  He also states that his constipation has improved since his cholecystectomy.    Follow-up  Pertinent negatives include no abdominal pain, arthralgias, change in bowel habit, chest pain, coughing, fatigue, fever, myalgias, nausea, rash or vomiting.       ROS:  Review of Systems   Constitutional:  Negative for activity change, appetite change, fatigue, fever and unexpected weight change.   HENT:  Negative for trouble swallowing.    Respiratory:  Negative for cough, " shortness of breath and wheezing.    Cardiovascular:  Negative for chest pain.   Gastrointestinal:  Positive for reflux. Negative for abdominal distention, abdominal pain, blood in stool, change in bowel habit, constipation, diarrhea, nausea, vomiting and change in bowel habit.   Musculoskeletal:  Negative for arthralgias, gait problem and myalgias.   Integumentary:  Negative for color change and rash.   Neurological:  Negative for syncope.   Hematological:  Does not bruise/bleed easily.   Psychiatric/Behavioral:  Negative for sleep disturbance. The patient is not nervous/anxious.         PMHX:  has a past medical history of Atrial fibrillation, Coronary artery disease involving native coronary artery of native heart without angina pectoris, Gout, Hyperlipidemia, Hypertension, Ischemic cardiomyopathy, and NSTEMI (non-ST elevated myocardial infarction) (10/2019).    PSHX:  has a past surgical history that includes Appendectomy; Biopsy with transrectal ultrasound (TRUS) guidance; Cardiac catheterization; Laparoscopic cholecystectomy (N/A, 01/04/2023); Excision basal cell carcinoma; and Squamous cell carcinoma excision.    PFHX: family history includes Cancer in his father; Colon cancer in his father; Heart attack in his mother.    PSlHX:  reports that he has never smoked. He has never used smokeless tobacco. He reports that he does not drink alcohol and does not use drugs.        Review of patient's allergies indicates:   Allergen Reactions    Pcn [penicillins]        Medication List with Changes/Refills   New Medications    PANTOPRAZOLE (PROTONIX) 40 MG TABLET    Take 1 tablet (40 mg total) by mouth once daily.   Current Medications    ALLOPURINOL (ZYLOPRIM) 300 MG TABLET    Take 1 tablet (300 mg total) by mouth once daily.    CLOPIDOGREL (PLAVIX) 75 MG TABLET    Take 1 tablet (75 mg total) by mouth once daily.    CYANOCOBALAMIN 1,000 MCG/ML INJECTION    Inject 1 mL (1,000 mcg total) into the muscle every 30 days.     DILTIAZEM (CARDIZEM CD) 240 MG 24 HR CAPSULE    Take 1 capsule (240 mg total) by mouth once daily.    EZETIMIBE (ZETIA) 10 MG TABLET    Take 1 tablet (10 mg total) by mouth once daily.    HYDROCODONE-ACETAMINOPHEN (NORCO) 7.5-325 MG PER TABLET    Take 1 tablet by mouth every 6 (six) hours as needed for Pain.    METOPROLOL SUCCINATE (TOPROL-XL) 50 MG 24 HR TABLET    Take 1 tablet (50 mg total) by mouth once daily.    MUPIROCIN (BACTROBAN) 2 % OINTMENT    Apply topically once daily.    ROSUVASTATIN (CRESTOR) 5 MG TABLET    Take 1 tablet (5 mg total) by mouth once daily.   Discontinued Medications    OMEPRAZOLE (PRILOSEC) 20 MG CAPSULE    Take 20 mg by mouth 2 (two) times a day.        Objective Findings:  Vital Signs:  BP (!) 148/91   Pulse 84   Ht 6' (1.829 m)   Wt 84 kg (185 lb 3.2 oz)   SpO2 100%   BMI 25.12 kg/m²  Body mass index is 25.12 kg/m².    Physical Exam:  Physical Exam  Vitals and nursing note reviewed.   Constitutional:       General: He is not in acute distress.     Appearance: Normal appearance.   HENT:      Mouth/Throat:      Mouth: Mucous membranes are moist.   Cardiovascular:      Rate and Rhythm: Normal rate.   Pulmonary:      Effort: Pulmonary effort is normal.      Breath sounds: No wheezing, rhonchi or rales.   Abdominal:      General: Bowel sounds are normal. There is no distension.      Palpations: Abdomen is soft. There is no mass.      Tenderness: There is no abdominal tenderness. There is no guarding.   Musculoskeletal:      Right lower leg: No edema.      Left lower leg: No edema.   Skin:     General: Skin is warm and dry.      Coloration: Skin is not jaundiced or pale.      Findings: No bruising.   Neurological:      Mental Status: He is alert and oriented to person, place, and time.   Psychiatric:         Mood and Affect: Mood normal.        Labs:  Lab Results   Component Value Date    WBC 7.63 12/16/2022    HGB 14.4 12/16/2022    HCT 44.4 12/16/2022    MCV 96.9 (H) 12/16/2022     RDW 14.0 12/16/2022     12/16/2022    LYMPH 12.5 (L) 12/16/2022    LYMPH 0.95 (L) 12/16/2022    MONO 8.0 (H) 12/16/2022    EOS 0.18 12/16/2022    BASO 0.06 12/16/2022     Lab Results   Component Value Date     12/16/2022    K 4.2 12/16/2022     12/16/2022    CO2 32 12/16/2022     (H) 12/16/2022    BUN 14 12/16/2022    CREATININE 0.88 12/16/2022    CALCIUM 9.1 12/16/2022    PROT 7.2 12/16/2022    ALBUMIN 3.9 12/16/2022    BILITOT 0.6 12/16/2022    ALKPHOS 99 12/16/2022    AST 17 12/16/2022    ALT 21 12/16/2022         Imaging: No results found.      Assessment:  Gris Hobbs is a 77 y.o. male here with:  1. Gastroesophageal reflux disease, unspecified whether esophagitis present    2. Delayed gastric emptying    3. Constipation, unspecified constipation type    4. 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 Vitamin B 12 deficiency          Recommendations:  1. Stop Omeprazole. Start Protonix 40 mg daily (Plavix)  2. Avoid spicy, greasy foods  Avoid caffeine, citric acid, chocolate, peppermint, and carbonated drinks  Do not lay down within 3 hours of eating  Increase fluid to 64 ounces daily  Avoid antiinflammatory medications such as motrin, advil, aleve, ibuprofen, and BC powder  3. Written gastroparesis diet  4. B 12 and folate today      Follow up in about 6 months (around 9/21/2023).      Order summary:  Orders Placed This Encounter    Vitamin B12 & Folate    pantoprazole (PROTONIX) 40 MG tablet       Thank you for allowing me to participate in the care of Gris Hobbs.      EVELYN Hamlin

## 2023-03-21 NOTE — TELEPHONE ENCOUNTER
Results called to patient. Verbalized understanding.          ----- Message from EDER Coleman sent at 3/21/2023 12:57 PM CDT -----  B 12 is normal.

## 2023-03-22 LAB — MICROORGANISM SPEC CULT: NORMAL

## 2023-04-09 DIAGNOSIS — Z71.89 COMPLEX CARE COORDINATION: ICD-10-CM

## 2023-04-19 ENCOUNTER — CLINICAL SUPPORT (OUTPATIENT)
Dept: DERMATOLOGY | Facility: CLINIC | Age: 78
End: 2023-04-19
Payer: MEDICARE

## 2023-04-19 DIAGNOSIS — Z51.89 VISIT FOR WOUND CHECK: ICD-10-CM

## 2023-04-19 DIAGNOSIS — L57.0 ACTINIC KERATOSES: Primary | ICD-10-CM

## 2023-04-19 PROCEDURE — 99499 NO LOS: ICD-10-PCS | Mod: ,,, | Performed by: STUDENT IN AN ORGANIZED HEALTH CARE EDUCATION/TRAINING PROGRAM

## 2023-04-19 PROCEDURE — 99499 UNLISTED E&M SERVICE: CPT | Mod: ,,, | Performed by: STUDENT IN AN ORGANIZED HEALTH CARE EDUCATION/TRAINING PROGRAM

## 2023-04-19 PROCEDURE — 17000 DESTRUCT PREMALG LESION: CPT | Mod: ,,, | Performed by: STUDENT IN AN ORGANIZED HEALTH CARE EDUCATION/TRAINING PROGRAM

## 2023-04-19 PROCEDURE — 17003 DESTRUCT PREMALG LES 2-14: CPT | Mod: ,,, | Performed by: STUDENT IN AN ORGANIZED HEALTH CARE EDUCATION/TRAINING PROGRAM

## 2023-04-19 PROCEDURE — 17003 DESTRUCTION, PREMALIGNANT LESIONS; SECOND THROUGH 14 LESIONS: ICD-10-PCS | Mod: ,,, | Performed by: STUDENT IN AN ORGANIZED HEALTH CARE EDUCATION/TRAINING PROGRAM

## 2023-04-19 PROCEDURE — 17000 PR DESTRUCTION(LASER SURGERY,CRYOSURGERY,CHEMOSURGERY),PREMALIGNANT LESIONS,FIRST LESION: ICD-10-PCS | Mod: ,,, | Performed by: STUDENT IN AN ORGANIZED HEALTH CARE EDUCATION/TRAINING PROGRAM

## 2023-04-19 NOTE — PROGRESS NOTES
Mohs accession #:   Previous dermpath accession #: [id number]  Location: left frontal scalp  Preop diagnosis:SCCIS  Postop diagnosis: Same  Mohs AUC score: 9  Number of stages: 3  Preop size: 2.5 x 1.5 cm  Postop size: 3.5 x 3.4 cm  Depth of defect: fat  Repair type: second intent    Patient is here for 4 week wound check. Wound debrided healing well no s/s of infection patient denies pain. Patient will return to clinic 06/20/23 for FSE.      Blaise'On Aaron, JOANN/IVC

## 2023-04-19 NOTE — PROGRESS NOTES
Actinic Keratoses  Erythematous, scaly papules on forearms and scalp     Plan: Liquid Nitrogen.  A total of 10 lesions were treated with liquid nitrogen for 2 freeze-thaw cycles lasting 5 seconds, located on the above locations.   The patient's consent was obtained including but not limited to risks of crusting, scabbing,  blistering, scarring, darker or lighter pigmentary change, recurrence, incomplete removal and infection.    Counseling.  Sun protective clothing and broad spectrum sunscreen can prevent the formation of AK.   AKs can be treated with cryotherapy, photodynamic therapy, imiquimod, topical 5-FU.  Actinic Keratoses are precancerous proliferations that occur within sun damaged skin. If untreated,  a small subset of AKs can develop into Squamous Cell Carcinoma.      - will schedule for PDT for scalp and arms

## 2023-05-02 RX ORDER — ALLOPURINOL 300 MG/1
300 TABLET ORAL DAILY
Qty: 90 TABLET | Refills: 6 | Status: SHIPPED | OUTPATIENT
Start: 2023-05-02

## 2023-05-10 ENCOUNTER — PROCEDURE VISIT (OUTPATIENT)
Dept: DERMATOLOGY | Facility: CLINIC | Age: 78
End: 2023-05-10
Payer: MEDICARE

## 2023-05-10 DIAGNOSIS — L57.0 ACTINIC KERATOSIS: Primary | ICD-10-CM

## 2023-05-10 PROCEDURE — 99499 NO LOS: ICD-10-PCS | Mod: ,,, | Performed by: STUDENT IN AN ORGANIZED HEALTH CARE EDUCATION/TRAINING PROGRAM

## 2023-05-10 PROCEDURE — 96574 DBRDMT PRMLG LES W/PDT: CPT | Mod: ,,, | Performed by: STUDENT IN AN ORGANIZED HEALTH CARE EDUCATION/TRAINING PROGRAM

## 2023-05-10 PROCEDURE — 96574 PR DEBRIDMENT,  W/PHOTODYNAMIC THERAPY, PREMALIGNANT LESION(S): ICD-10-PCS | Mod: ,,, | Performed by: STUDENT IN AN ORGANIZED HEALTH CARE EDUCATION/TRAINING PROGRAM

## 2023-05-10 PROCEDURE — 99499 UNLISTED E&M SERVICE: CPT | Mod: ,,, | Performed by: STUDENT IN AN ORGANIZED HEALTH CARE EDUCATION/TRAINING PROGRAM

## 2023-05-10 NOTE — PATIENT INSTRUCTIONS
Star-Light After-Care Instructions:  Wash areas with gentle cleanser and water, pat dry, and apply a heavy moisturizing cream or vaseline several times a day for the first few days if needed.  Stay completely out and away from sunlight for at least 40 hours.  Exposure to sunlight and bright lights within this time period can lead to a severe blistering sunburn in the areas treated. This includes dental exam lights, sitting by windows, driving a car during daylight, and bright reading lamps.  If outdoor exposure to sun or bright lights are unavoidable, wear a hat with a brim, cover your face with a scarf, wear large sunglasses and sunblock. Sunblock alone is NOT ENOUGH PROTECTION to prevent a burn in the first 40 hours after treatment since the Levulan is activated by visible light, not Ultraviolet light (UV).  You should apply a physical sunblock every two hours to the treated areas for at least 4 weeks following treatment regardless of the time of year or if it is overcast or cloudy. Sun exposure may lead to the production of blotchy dark pigmentation which may take several months to fade.  Avoid excessive heat exposure such as saunas, steam rooms, hot showers or baths, and strenuous exercising for 24 hours to minimize risk of blistering.  Notify the office if you have any redness, excessive puffiness, or other unusual side effects that last longer than one month.

## 2023-05-10 NOTE — PROGRESS NOTES
Plan: PDT: Blue    Treatment Number: 1  Location:bilateral arms  Total Incubation Time: 1:45  Incubation Start Time: 0900  Incubation End Time: 1045  Illumination Time: 00:16:40  Procedure Performed By: Thomas Lubin MD  Medical Necessity: Precancerous Lesions    Written consent obtained. The risks were reviewed with the patient including but not limited to: pigmentary changes, pain, blistering, scabbing, redness, and the remote possibility of scarring. Prior to application of the photodynamic medication the hyperkeratotic lesions were curetted to make them more amenable to therapy. The treatment areas were cleaned and prepped in the usual fashion. Two Levulan Kerasticks were applied to the face and incubated for 2:00. The treatment area was then illuminated with a Star-U light source for 00:16:40. I reviewed with the patient in detail post-care instructions. Patient is to avoid sunlight for the next 2 days, and wear sun protection. Patients may expect sunburn like redness, discomfort and scabbing.    Lot Number: [id number]  Expiration Date: 06/2026  NDC Number: 78928-700-03

## 2023-05-17 ENCOUNTER — PROCEDURE VISIT (OUTPATIENT)
Dept: DERMATOLOGY | Facility: CLINIC | Age: 78
End: 2023-05-17
Payer: MEDICARE

## 2023-05-17 DIAGNOSIS — L57.0 ACTINIC KERATOSIS: ICD-10-CM

## 2023-05-17 DIAGNOSIS — D48.5 NEOPLASM OF UNCERTAIN BEHAVIOR OF SKIN: Primary | ICD-10-CM

## 2023-05-17 DIAGNOSIS — L57.0 ACTINIC KERATOSES: ICD-10-CM

## 2023-05-17 PROCEDURE — 88305 PATHOLOGY, DERMATOLOGY: ICD-10-PCS | Mod: 26,,, | Performed by: PATHOLOGY

## 2023-05-17 PROCEDURE — 99499 NO LOS: ICD-10-PCS | Mod: ,,, | Performed by: STUDENT IN AN ORGANIZED HEALTH CARE EDUCATION/TRAINING PROGRAM

## 2023-05-17 PROCEDURE — 96574 DBRDMT PRMLG LES W/PDT: CPT | Mod: ,,, | Performed by: STUDENT IN AN ORGANIZED HEALTH CARE EDUCATION/TRAINING PROGRAM

## 2023-05-17 PROCEDURE — 96574 PR DEBRIDMENT,  W/PHOTODYNAMIC THERAPY, PREMALIGNANT LESION(S): ICD-10-PCS | Mod: ,,, | Performed by: STUDENT IN AN ORGANIZED HEALTH CARE EDUCATION/TRAINING PROGRAM

## 2023-05-17 PROCEDURE — 11102 TANGNTL BX SKIN SINGLE LES: CPT | Mod: XS,,, | Performed by: STUDENT IN AN ORGANIZED HEALTH CARE EDUCATION/TRAINING PROGRAM

## 2023-05-17 PROCEDURE — 11102 PR TANGENTIAL BIOPSY, SKIN, SINGLE LESION: ICD-10-PCS | Mod: XS,,, | Performed by: STUDENT IN AN ORGANIZED HEALTH CARE EDUCATION/TRAINING PROGRAM

## 2023-05-17 PROCEDURE — 88305 TISSUE EXAM BY PATHOLOGIST: CPT | Mod: TC,SUR | Performed by: STUDENT IN AN ORGANIZED HEALTH CARE EDUCATION/TRAINING PROGRAM

## 2023-05-17 PROCEDURE — 88305 TISSUE EXAM BY PATHOLOGIST: CPT | Mod: 26,,, | Performed by: PATHOLOGY

## 2023-05-17 PROCEDURE — 99499 UNLISTED E&M SERVICE: CPT | Mod: ,,, | Performed by: STUDENT IN AN ORGANIZED HEALTH CARE EDUCATION/TRAINING PROGRAM

## 2023-05-17 NOTE — PROGRESS NOTES
Plan: PDT: Blue    Treatment Number: 2  Location:scalp  Total Incubation Time: 2:00  Incubation Start Time: 0900  Incubation End Time: 1045  Illumination Time: 00:16:40  Procedure Performed By: Thomas Lubin MD  Medical Necessity: Precancerous Lesions    Written consent obtained. The risks were reviewed with the patient including but not limited to: pigmentary changes, pain, blistering, scabbing, redness, and the remote possibility of scarring. Prior to application of the photodynamic medication the hyperkeratotic lesions were curetted to make them more amenable to therapy. The treatment areas were cleaned and prepped in the usual fashion. Two Levulan Kerasticks were applied to the scalp and incubated for 2:00. The treatment area was then illuminated with a Star-U light source for 00:16:40. I reviewed with the patient in detail post-care instructions. Patient is to avoid sunlight for the next 2 days, and wear sun protection. Patients may expect sunburn like redness, discomfort and scabbing.    Lot Number: [id number]  Expiration Date: 2026-06  NDC Number: 91126-007-89    Neoplasm of Uncertain Behavior   - scaly, tender papule located on the R forearm  Ddx includes: SCC  Shave biopsy      Pre-procedure Diagnosis: as above  Post_procedure Diagnosis: same  Estimated Blood Loss: <1cc    Findings: None  Complications: None  Specimens: to pathology      Written informed consent was obtained after discussing risks including pain, bleeding, infection, recurrence and scarring. The biopsy site was sterilely prepped with alcohol, which was allowed to dry completely, then locally infiltrated with 1% lidocaine with epinephrine, ~3 cc total. A shave biopsy was obtained using a Dermablade/15 and the specimen was sent to dermatopathology. Aluminum chloride was used for hemostasis. Antibiotic ointment and a clean dressing were applied. The patient tolerated the procedure well without complications. Verbal and written wound care  instructions were given.    Thomas Lubin MD

## 2023-05-19 LAB
DHEA SERPL-MCNC: NORMAL
ESTROGEN SERPL-MCNC: NORMAL PG/ML
INSULIN SERPL-ACNC: NORMAL U[IU]/ML
LAB AP GROSS DESCRIPTION: NORMAL
LAB AP LABORATORY NOTES: NORMAL
LAB AP SPEC A DDX: NORMAL
LAB AP SPEC A MORPHOLOGY: NORMAL
LAB AP SPEC A PROCEDURE: NORMAL
T3RU NFR SERPL: NORMAL %

## 2023-05-25 ENCOUNTER — PROCEDURE VISIT (OUTPATIENT)
Dept: DERMATOLOGY | Facility: CLINIC | Age: 78
End: 2023-05-25
Payer: MEDICARE

## 2023-05-25 VITALS — HEART RATE: 94 BPM | SYSTOLIC BLOOD PRESSURE: 133 MMHG | DIASTOLIC BLOOD PRESSURE: 81 MMHG

## 2023-05-25 DIAGNOSIS — C44.622 SCC (SQUAMOUS CELL CARCINOMA), ARM, RIGHT: Primary | ICD-10-CM

## 2023-05-25 PROCEDURE — 12032 PR LAYR CLOS WND TRUNK,ARM,LEG 2.6-7.5 CM: ICD-10-PCS | Mod: 51,,, | Performed by: STUDENT IN AN ORGANIZED HEALTH CARE EDUCATION/TRAINING PROGRAM

## 2023-05-25 PROCEDURE — 88305 PATHOLOGY, DERMATOLOGY: ICD-10-PCS | Mod: 26,,, | Performed by: PATHOLOGY

## 2023-05-25 PROCEDURE — 12032 INTMD RPR S/A/T/EXT 2.6-7.5: CPT | Mod: 51,,, | Performed by: STUDENT IN AN ORGANIZED HEALTH CARE EDUCATION/TRAINING PROGRAM

## 2023-05-25 PROCEDURE — 88305 TISSUE EXAM BY PATHOLOGIST: CPT | Mod: 26,,, | Performed by: PATHOLOGY

## 2023-05-25 PROCEDURE — 99499 NO LOS: ICD-10-PCS | Mod: ,,, | Performed by: STUDENT IN AN ORGANIZED HEALTH CARE EDUCATION/TRAINING PROGRAM

## 2023-05-25 PROCEDURE — 99499 UNLISTED E&M SERVICE: CPT | Mod: ,,, | Performed by: STUDENT IN AN ORGANIZED HEALTH CARE EDUCATION/TRAINING PROGRAM

## 2023-05-25 PROCEDURE — 11602 PR EXC SKIN MALIG 1.1-2 CM TRUNK,ARM,LEG: ICD-10-PCS | Mod: ,,, | Performed by: STUDENT IN AN ORGANIZED HEALTH CARE EDUCATION/TRAINING PROGRAM

## 2023-05-25 PROCEDURE — 88305 TISSUE EXAM BY PATHOLOGIST: CPT | Mod: TC,SUR | Performed by: STUDENT IN AN ORGANIZED HEALTH CARE EDUCATION/TRAINING PROGRAM

## 2023-05-25 PROCEDURE — 11602 EXC TR-EXT MAL+MARG 1.1-2 CM: CPT | Mod: ,,, | Performed by: STUDENT IN AN ORGANIZED HEALTH CARE EDUCATION/TRAINING PROGRAM

## 2023-05-25 NOTE — PATIENT INSTRUCTIONS

## 2023-05-25 NOTE — PROGRESS NOTES
Excision Consult Note    Gris Hobbs is a 77 y.o. male who is referred by Dr. Lubin for evaluation of a squamous proliferation, favor SCC on the R forearm.     Recurrent skin cancer: No    Preoperative Risk Factors:  Current Anticoagulants: Yes  Plavix  Endocarditis / Rheumatic Fever hx: No  Immunocompromised: No  Prosthetic joint: No  Congenital heart defect: No  Prosthetic heart valve: No  Diabetic: No  Transplant: No  Pacemaker: No  Defibrillator:  No  Prior problem with local anesthesia: No  Tobacco History: No]  Clindamycin Allergy: No  Pregnant: no     Transmissible Diseases:  HIV No  Hepatitis B or C  No      Exam:  Limited skin exam is normal except for a SCC located on the  R forearm .    Pathologic Findings:  Accession # [id number]  Diagnosis: Squamous proliferation, favor SCC    Assessment and Plan:  Treatment Options : Given the indications and high cure rate, the patient has agreed to proceed with excision  Risks and Benefits : The rationale for excision was explained to the patient. The risks and benefits to therapy were discussed in detail. Specifically, the risks of infection, scarring, bleeding, dehiscence, hematoma, prolonged wound healing, incomplete removal, allergy to anesthesia, nerve injury, inability to clear the lesion and recurrence were addressed. The treatment site was clearly identified and confirmed by the patient.    Plan:  Excision    Thomas Lubin MD   Mohs Surgery/Dermatologic Oncology

## 2023-05-25 NOTE — PROGRESS NOTES
[facility]    Thomas Lubin MD    Elliptical Excision with Linear Closure    Tumor Type: Squamous proliferation, favor SCC  Location:  R forearm  Derm-Path Accession #:  [id number]  Lesion Size:  1.1 x 0.4 cm   Surgical Margins: 0.3 cm   Post op size: 1.7 x 1.0 cm   Level of Defect:  Fat  Repair Type:  Linear   Repair Length:  3.7 cm  Sutures: 3-0 Monocryl; 4-0, Prolene    Primary Surgeon: HOLLIE Lubin MD      INDICATIONS:  The risks of bleeding, infection, discomfort, incomplete removal, and scar formation were explained to the patient.  All questions were answered.  After informed consent, confirmation of site and identity, and appropriate instructions, the patient underwent the procedure as follows:    PROCEDURE:  With the patient in a supine position, the lesion was outlined with the above margins. An ellipse was designed around the lesion to conform to relaxed skin tension lines in an effort to minimize scarring and deformity.  The patient was then placed in a supine position.  The lesion and surrounding skin were prepped with chlorhexidine, draped, and anesthetized with 1% lidocaine with epinephrine 1:100,100 buffered with 1:10 sodium bicarbonate.  Using a #15 blade, the skin was excised along premarked lines.  The resulting defect extended through deep subcutaneous tissue.  Wound margins were undermined to limit functional deformity/impairment of adjacent structures.  Bleeding vessels were controlled with monopolar  electrodessication .  A deep placating retention suture was placed to offload tension to the deep margin. The dermis and subcutaneous tissue were closed with buried vertical mattress sutures.  Epidermal approximation was meticulously refined with simple running sutures, resulting in a linear closure with little to no wound tension.  Blood loss was estimated to be less than 5cc.  The area was coated with petrolatum and covered with a non-adherent dressing followed by gauze and tape.   Postoperative instructions were reviewed per protocol.  The patient left alert and fully oriented.        Thomas Lubin MD

## 2023-06-05 ENCOUNTER — CLINICAL SUPPORT (OUTPATIENT)
Dept: DERMATOLOGY | Facility: CLINIC | Age: 78
End: 2023-06-05
Payer: MEDICARE

## 2023-06-05 DIAGNOSIS — L57.0 ACTINIC KERATOSIS: ICD-10-CM

## 2023-06-05 DIAGNOSIS — Z48.02 VISIT FOR SUTURE REMOVAL: Primary | ICD-10-CM

## 2023-06-05 PROCEDURE — 99024 POSTOP FOLLOW-UP VISIT: CPT | Mod: ,,, | Performed by: STUDENT IN AN ORGANIZED HEALTH CARE EDUCATION/TRAINING PROGRAM

## 2023-06-05 PROCEDURE — 17003 DESTRUCT PREMALG LES 2-14: CPT | Mod: ,,, | Performed by: STUDENT IN AN ORGANIZED HEALTH CARE EDUCATION/TRAINING PROGRAM

## 2023-06-05 PROCEDURE — 17000 DESTRUCT PREMALG LESION: CPT | Mod: ,,, | Performed by: STUDENT IN AN ORGANIZED HEALTH CARE EDUCATION/TRAINING PROGRAM

## 2023-06-05 PROCEDURE — 17003 DESTRUCTION, PREMALIGNANT LESIONS; SECOND THROUGH 14 LESIONS: ICD-10-PCS | Mod: ,,, | Performed by: STUDENT IN AN ORGANIZED HEALTH CARE EDUCATION/TRAINING PROGRAM

## 2023-06-05 PROCEDURE — 99024 PR POST-OP FOLLOW-UP VISIT: ICD-10-PCS | Mod: ,,, | Performed by: STUDENT IN AN ORGANIZED HEALTH CARE EDUCATION/TRAINING PROGRAM

## 2023-06-05 PROCEDURE — 17000 PR DESTRUCTION(LASER SURGERY,CRYOSURGERY,CHEMOSURGERY),PREMALIGNANT LESIONS,FIRST LESION: ICD-10-PCS | Mod: ,,, | Performed by: STUDENT IN AN ORGANIZED HEALTH CARE EDUCATION/TRAINING PROGRAM

## 2023-06-05 NOTE — PROGRESS NOTES
Elliptical Excision with Linear Closure     Tumor Type: Squamous proliferation, favor SCC  Location:  R forearm  Derm-Path Accession #:  [id number]  Lesion Size:  1.1 x 0.4 cm   Surgical Margins: 0.3 cm   Post op size: 1.7 x 1.0 cm   Level of Defect:  Fat  Repair Type:  Linear   Repair Length:  3.7 cm  Sutures: 3-0 Monocryl; 4-0, Prolene    Patient is here for SR. Incision is healing well no s/s of infection noted. Patient tolerated well. Patient is to return to clinic 08/18/2023 @ 1000 am for FSE.        Blaise'On JOANN Walker/IVC

## 2023-06-19 RX ORDER — METOPROLOL SUCCINATE 50 MG/1
50 TABLET, EXTENDED RELEASE ORAL DAILY
Qty: 16 TABLET | Refills: 0 | Status: SHIPPED | OUTPATIENT
Start: 2023-06-19 | End: 2023-07-05 | Stop reason: SDUPTHER

## 2023-06-23 RX ORDER — DILTIAZEM HYDROCHLORIDE 240 MG/1
CAPSULE, COATED, EXTENDED RELEASE ORAL
Qty: 90 CAPSULE | Refills: 3 | Status: SHIPPED | OUTPATIENT
Start: 2023-06-23 | End: 2023-07-05 | Stop reason: SDUPTHER

## 2023-07-05 ENCOUNTER — OFFICE VISIT (OUTPATIENT)
Dept: CARDIOLOGY | Facility: CLINIC | Age: 78
End: 2023-07-05
Payer: MEDICARE

## 2023-07-05 VITALS — BODY MASS INDEX: 26.86 KG/M2 | OXYGEN SATURATION: 99 % | HEART RATE: 84 BPM | WEIGHT: 187.63 LBS | HEIGHT: 70 IN

## 2023-07-05 DIAGNOSIS — I48.0 PAROXYSMAL ATRIAL FIBRILLATION: ICD-10-CM

## 2023-07-05 DIAGNOSIS — E78.5 HYPERLIPIDEMIA, UNSPECIFIED HYPERLIPIDEMIA TYPE: ICD-10-CM

## 2023-07-05 DIAGNOSIS — I25.10 CORONARY ARTERY DISEASE, UNSPECIFIED VESSEL OR LESION TYPE, UNSPECIFIED WHETHER ANGINA PRESENT, UNSPECIFIED WHETHER NATIVE OR TRANSPLANTED HEART: Primary | ICD-10-CM

## 2023-07-05 DIAGNOSIS — I10 HYPERTENSION, ESSENTIAL: Chronic | ICD-10-CM

## 2023-07-05 DIAGNOSIS — I25.10 CORONARY ARTERY DISEASE INVOLVING NATIVE CORONARY ARTERY OF NATIVE HEART WITHOUT ANGINA PECTORIS: Chronic | ICD-10-CM

## 2023-07-05 DIAGNOSIS — I10 HYPERTENSION, UNSPECIFIED TYPE: ICD-10-CM

## 2023-07-05 PROCEDURE — 99212 OFFICE O/P EST SF 10 MIN: CPT | Mod: S$PBB,,, | Performed by: NURSE PRACTITIONER

## 2023-07-05 PROCEDURE — 99212 PR OFFICE/OUTPT VISIT, EST, LEVL II, 10-19 MIN: ICD-10-PCS | Mod: S$PBB,,, | Performed by: NURSE PRACTITIONER

## 2023-07-05 PROCEDURE — 93005 ELECTROCARDIOGRAM TRACING: CPT | Mod: PBBFAC | Performed by: INTERNAL MEDICINE

## 2023-07-05 PROCEDURE — 99214 OFFICE O/P EST MOD 30 MIN: CPT | Mod: PBBFAC | Performed by: NURSE PRACTITIONER

## 2023-07-05 PROCEDURE — 93010 EKG 12-LEAD: ICD-10-PCS | Mod: S$PBB,,, | Performed by: INTERNAL MEDICINE

## 2023-07-05 PROCEDURE — 93010 ELECTROCARDIOGRAM REPORT: CPT | Mod: S$PBB,,, | Performed by: INTERNAL MEDICINE

## 2023-07-05 RX ORDER — MULTIVITAMIN
1 TABLET ORAL DAILY
COMMUNITY

## 2023-07-05 RX ORDER — DILTIAZEM HYDROCHLORIDE 240 MG/1
240 CAPSULE, COATED, EXTENDED RELEASE ORAL DAILY
Qty: 90 CAPSULE | Refills: 3 | Status: SHIPPED | OUTPATIENT
Start: 2023-07-05 | End: 2023-07-05 | Stop reason: SDUPTHER

## 2023-07-05 RX ORDER — METOPROLOL SUCCINATE 50 MG/1
50 TABLET, EXTENDED RELEASE ORAL DAILY
Qty: 90 TABLET | Refills: 1 | Status: SHIPPED | OUTPATIENT
Start: 2023-07-05 | End: 2024-01-01

## 2023-07-05 RX ORDER — ACETAMINOPHEN 500 MG
5000 TABLET ORAL DAILY
COMMUNITY

## 2023-07-05 RX ORDER — ASPIRIN 81 MG/1
81 TABLET ORAL DAILY
COMMUNITY

## 2023-07-05 RX ORDER — DILTIAZEM HYDROCHLORIDE 240 MG/1
240 CAPSULE, COATED, EXTENDED RELEASE ORAL DAILY
Qty: 90 CAPSULE | Refills: 3 | Status: SHIPPED | OUTPATIENT
Start: 2023-07-05

## 2023-07-05 RX ORDER — ROSUVASTATIN CALCIUM 5 MG/1
5 TABLET, COATED ORAL DAILY
Qty: 90 TABLET | Refills: 1 | Status: SHIPPED | OUTPATIENT
Start: 2023-07-05 | End: 2024-07-04

## 2023-07-05 RX ORDER — CLOPIDOGREL BISULFATE 75 MG/1
75 TABLET ORAL DAILY
Qty: 90 TABLET | Refills: 1 | Status: SHIPPED | OUTPATIENT
Start: 2023-07-05 | End: 2023-07-05 | Stop reason: ALTCHOICE

## 2023-07-05 RX ORDER — EZETIMIBE 10 MG/1
10 TABLET ORAL DAILY
Qty: 90 TABLET | Refills: 1 | Status: SHIPPED | OUTPATIENT
Start: 2023-07-05 | End: 2024-07-04

## 2023-07-05 NOTE — PATIENT INSTRUCTIONS
Options   Referral for EP study/ablation  If are high risk for falls/bleeding may be a candidate for the Watchman procedure.    We started Eliquis 5 mg po twice a day for stoke prevention with a fib today.  Stop the Plavix  Continue the Aspirin.

## 2023-07-05 NOTE — PROGRESS NOTES
PCP: Yobany Marsh MD    Referring Provider:     Subjective:   Gris Hobbs is a 77 y.o. male with hx of paroxsymal atrial fib (EEC0-QS6-XPVa score 3), CAD s/p MINDY r-PDA and mid and prox LAD (10/31/2019, Dr. Bae), HLD, HTN, iCMP (LVEF 55%), and NSTEMI  (10/31/2019), who presents for routine follow up. He states that he is doing well and only occasionally has palpitations, mostly when he is lying down. He denies cp, pressure, heaviness or tightness.         Fhx:  Family History   Problem Relation Age of Onset    Heart attack Mother     Colon cancer Father     Cancer Father      Shx:   Social History     Socioeconomic History    Marital status:    Tobacco Use    Smoking status: Never    Smokeless tobacco: Never   Substance and Sexual Activity    Alcohol use: Never    Drug use: Never    Sexual activity: Not Currently       EKG 7/5/2023 atrial fibrillation; HR 78 bpm; low voltage QRS  12/16/2022 atrial fibrillation with PVC's; HR 79 bpm; low voltage QRS    ECHO No results found for this or any previous visit.    [facility] 10/31/19 Dr. Bae--   2 V CAD involving the LAD and RCA.   2. Primary stent right PDA 2.25 x 15 mm Xience Safia MINDY  3. PTCA/Stent of the mid and prox LAD 3.0 x 18 mmn Xience Safia MINDY overlapped proximally by a 3.25 x 33 mm Xience Safia MINDY  4. Normal LV size with anteropical hypokinesis and overall   5. normal LVSF, EF 55%.   6. No MR.   7. LVDD.     Lab Results   Component Value Date     12/16/2022    K 4.2 12/16/2022     12/16/2022    CO2 32 12/16/2022    BUN 14 12/16/2022    CREATININE 0.88 12/16/2022    CALCIUM 9.1 12/16/2022    ANIONGAP 9 12/16/2022       Lab Results   Component Value Date    CHOL 227 (H) 10/21/2022    CHOL 179 08/08/2022     Lab Results   Component Value Date    HDL 62 (H) 10/21/2022    HDL 49 08/08/2022     Lab Results   Component Value Date    LDLCALC 149 10/21/2022    LDLCALC 113 08/08/2022     Lab Results   Component Value Date    TRIG 78  10/21/2022    TRIG 83 08/08/2022     Lab Results   Component Value Date    CHOLHDL 3.7 10/21/2022    CHOLHDL 3.7 08/08/2022       Lab Results   Component Value Date    WBC 7.63 12/16/2022    HGB 14.4 12/16/2022    HCT 44.4 12/16/2022    MCV 96.9 (H) 12/16/2022     12/16/2022           Current Outpatient Medications:     allopurinoL (ZYLOPRIM) 300 MG tablet, Take 1 tablet (300 mg total) by mouth once daily., Disp: 90 tablet, Rfl: 6    aspirin (ECOTRIN) 81 MG EC tablet, Take 81 mg by mouth once daily., Disp: , Rfl:     cholecalciferol, vitamin D3, (VITAMIN D3) 125 mcg (5,000 unit) Tab, Take 5,000 Units by mouth once daily., Disp: , Rfl:     multivitamin (ONE DAILY MULTIVITAMIN) per tablet, Take 1 tablet by mouth once daily., Disp: , Rfl:     mupirocin (BACTROBAN) 2 % ointment, Apply topically once daily., Disp: 22 g, Rfl: 5    pantoprazole (PROTONIX) 40 MG tablet, Take 1 tablet (40 mg total) by mouth once daily., Disp: 30 tablet, Rfl: 11    apixaban (ELIQUIS) 5 mg Tab, Take 1 tablet (5 mg total) by mouth 2 (two) times daily., Disp: 60 tablet, Rfl: 3    cyanocobalamin 1,000 mcg/mL injection, Inject 1 mL (1,000 mcg total) into the muscle every 30 days. (Patient not taking: Reported on 7/5/2023), Disp: 1 mL, Rfl: 4    diltiaZEM (CARDIZEM CD) 240 MG 24 hr capsule, Take 1 capsule (240 mg total) by mouth once daily., Disp: 90 capsule, Rfl: 3    ezetimibe (ZETIA) 10 mg tablet, Take 1 tablet (10 mg total) by mouth once daily., Disp: 90 tablet, Rfl: 1    HYDROcodone-acetaminophen (NORCO) 7.5-325 mg per tablet, Take 1 tablet by mouth every 6 (six) hours as needed for Pain. (Patient not taking: Reported on 7/5/2023), Disp: 24 tablet, Rfl: 0    metoprolol succinate (TOPROL-XL) 50 MG 24 hr tablet, Take 1 tablet (50 mg total) by mouth once daily., Disp: 90 tablet, Rfl: 1    rosuvastatin (CRESTOR) 5 MG tablet, Take 1 tablet (5 mg total) by mouth once daily., Disp: 90 tablet, Rfl: 1  Meds reviewed and reconciled.     Review of  "Systems   Respiratory:  Negative for shortness of breath.    Cardiovascular:  Positive for palpitations. Negative for chest pain, orthopnea, claudication, leg swelling and PND.        Palpitations every now and then.    Neurological:  Negative for dizziness, loss of consciousness and weakness.         Objective:   Pulse 84   Ht 5' 10" (1.778 m)   Wt 85.1 kg (187 lb 9.6 oz)   SpO2 99%   BMI 26.92 kg/m²     Physical Exam  Vitals and nursing note reviewed.   Constitutional:       Appearance: Normal appearance. He is obese.   HENT:      Head: Normocephalic and atraumatic.   Neck:      Vascular: No carotid bruit.   Cardiovascular:      Rate and Rhythm: Normal rate. Rhythm irregular.      Pulses: Normal pulses.      Heart sounds: Normal heart sounds.   Pulmonary:      Effort: Pulmonary effort is normal.      Breath sounds: Normal breath sounds.   Abdominal:      Palpations: Abdomen is soft.   Musculoskeletal:      Cervical back: Neck supple.      Right lower leg: No edema.      Left lower leg: No edema.   Skin:     General: Skin is warm and dry.      Capillary Refill: Capillary refill takes less than 2 seconds.   Neurological:      Mental Status: He is alert.         Assessment:     1. Coronary artery disease, unspecified vessel or lesion type, unspecified whether angina present, unspecified whether native or transplanted heart  EKG 12-lead    EKG 12-lead    DISCONTINUED: clopidogreL (PLAVIX) 75 mg tablet      2. Hyperlipidemia, unspecified hyperlipidemia type  ezetimibe (ZETIA) 10 mg tablet    rosuvastatin (CRESTOR) 5 MG tablet      3. Hypertension, unspecified type  metoprolol succinate (TOPROL-XL) 50 MG 24 hr tablet      4. Paroxysmal atrial fibrillation  apixaban (ELIQUIS) 5 mg Tab    diltiaZEM (CARDIZEM CD) 240 MG 24 hr capsule      5. Hypertension, essential        6. Coronary artery disease involving native coronary artery of native heart without angina pectoris              Plan:   Paroxysmal atrial " fibrillation  We will start Eliquis 5 mg po BID for CVA prophylaxis; stop plavix and continue ASA 81 mg po daily.  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 He will d/w them his options and notify my office should he wish to go in a different direction.       Hypertension, essential  124/80 mmHg    Hyperlipidemia  Lipid panel from 10/21/2022  Trig 78 mg/dl   mg/dl  HDL 62 mg/dl  According to documentation this was done while he was off Zetia due to gastric issues. This was to be addressed after gastric issues. This was documented in 10/2022. He was st arted on Crestor 5 mg when last seen by Dr. Bae 6/22/2022 (Crestor 10 mg po daily caused myalgias). He does not have a Zetia or Crestor in his meds today. Unsure if this was due to his gastric issues or not. I will defer to PCP.     Coronary artery disease involving native coronary artery of native heart without angina pectoris  Asymptomatic  Continue ASA  Needs to be on a statin of unable to take statin zetia or repatha/praulent to reduce his LDl to goal of <70 mm/hg.  I will defer to PCP.     RTC: 1 year

## 2023-07-11 DIAGNOSIS — I48.91 ATRIAL FIBRILLATION, UNSPECIFIED TYPE: Primary | ICD-10-CM

## 2023-07-24 ENCOUNTER — OFFICE VISIT (OUTPATIENT)
Dept: FAMILY MEDICINE | Facility: CLINIC | Age: 78
End: 2023-07-24
Payer: MEDICARE

## 2023-07-24 VITALS
OXYGEN SATURATION: 99 % | TEMPERATURE: 99 F | SYSTOLIC BLOOD PRESSURE: 146 MMHG | DIASTOLIC BLOOD PRESSURE: 84 MMHG | HEART RATE: 68 BPM | HEIGHT: 70 IN | WEIGHT: 186.38 LBS | BODY MASS INDEX: 26.68 KG/M2 | RESPIRATION RATE: 16 BRPM

## 2023-07-24 DIAGNOSIS — M54.2 NECK PAIN: Primary | ICD-10-CM

## 2023-07-24 DIAGNOSIS — I10 HYPERTENSION, ESSENTIAL: Chronic | ICD-10-CM

## 2023-07-24 PROCEDURE — 99213 PR OFFICE/OUTPT VISIT, EST, LEVL III, 20-29 MIN: ICD-10-PCS | Mod: ,,, | Performed by: FAMILY MEDICINE

## 2023-07-24 PROCEDURE — 99213 OFFICE O/P EST LOW 20 MIN: CPT | Mod: ,,, | Performed by: FAMILY MEDICINE

## 2023-07-24 PROCEDURE — 96372 THER/PROPH/DIAG INJ SC/IM: CPT | Mod: ,,, | Performed by: FAMILY MEDICINE

## 2023-07-24 PROCEDURE — 96372 PR INJECTION,THERAP/PROPH/DIAG2ST, IM OR SUBCUT: ICD-10-PCS | Mod: ,,, | Performed by: FAMILY MEDICINE

## 2023-07-24 RX ORDER — DEXAMETHASONE SODIUM PHOSPHATE 4 MG/ML
8 INJECTION, SOLUTION INTRA-ARTICULAR; INTRALESIONAL; INTRAMUSCULAR; INTRAVENOUS; SOFT TISSUE
Status: COMPLETED | OUTPATIENT
Start: 2023-07-24 | End: 2023-07-24

## 2023-07-24 RX ORDER — HYDROCODONE BITARTRATE AND ACETAMINOPHEN 5; 325 MG/1; MG/1
1 TABLET ORAL EVERY 6 HOURS PRN
Qty: 15 TABLET | Refills: 0 | Status: SHIPPED | OUTPATIENT
Start: 2023-07-24

## 2023-07-24 RX ADMIN — DEXAMETHASONE SODIUM PHOSPHATE 8 MG: 4 INJECTION, SOLUTION INTRA-ARTICULAR; INTRALESIONAL; INTRAMUSCULAR; INTRAVENOUS; SOFT TISSUE at 09:07

## 2023-07-28 NOTE — PROGRESS NOTES
Yobany Marsh MD        PATIENT NAME: Gris Hobbs  : 1945  DATE: 23  MRN: [id number]      Billing Provider: Yobany Marsh MD  Level of Service: LA OFFICE/OUTPT VISIT, EST, LEVL III, 20-29 MIN  Patient PCP Information       Provider PCP Type    Yobany Marsh MD General            Reason for Visit / Chief Complaint: Neck Pain (Neck pain since last Wednesday)       Update PCP  Update Chief Complaint         History of Present Illness / Problem Focused Workflow     Gris Hobbs presents to the clinic with Neck Pain (Neck pain since last Wednesday)     Accompanied by his wife with a Crick in his neck for a week.  No radiation to his upper extremities.    Neck Pain   Pertinent negatives include no chest pain, fever, headaches, photophobia, trouble swallowing or weakness (global weakness).     Review of Systems     Review of Systems   Constitutional:  Negative for activity change, appetite change, fever and unexpected weight change.   HENT:  Negative for congestion, rhinorrhea, sinus pressure, sinus pain, sore throat and trouble swallowing.    Eyes:  Negative for photophobia, pain, discharge and visual disturbance.   Respiratory:  Negative for cough, chest tightness, wheezing and stridor.    Cardiovascular:  Negative for chest pain, palpitations and leg swelling.   Gastrointestinal:  Negative for abdominal pain, blood in stool, constipation, diarrhea and nausea.   Endocrine: Negative for polydipsia, polyphagia and polyuria.   Genitourinary:  Negative for difficulty urinating, flank pain and hematuria.   Musculoskeletal:  Positive for neck pain. Negative for arthralgias.   Skin:  Negative for rash.   Allergic/Immunologic: Negative for food allergies.   Neurological:  Negative for dizziness, tremors, seizures, syncope, weakness (global weakness) and headaches.   Psychiatric/Behavioral:  Negative for behavioral problems, confusion, decreased concentration, dysphoric mood and hallucinations.  The patient is not nervous/anxious.       Medical / Social / Family History     Past Medical History:   Diagnosis Date    Atrial fibrillation     Coronary artery disease involving native coronary artery of native heart without angina pectoris     Gout     Hyperlipidemia     Hypertension     Ischemic cardiomyopathy     NSTEMI (non-ST elevated myocardial infarction) 10/2019       Past Surgical History:   Procedure Laterality Date    APPENDECTOMY      BASAL CELL CARCINOMA EXCISION      BIOPSY WITH TRANSRECTAL ULTRASOUND (TRUS) GUIDANCE      CARDIAC CATHETERIZATION      LAPAROSCOPIC CHOLECYSTECTOMY N/A 01/04/2023    Procedure: CHOLECYSTECTOMY, LAPAROSCOPIC;  Surgeon: Pablito Feliciano MD;  Location: [facility];  Service: General;  Laterality: N/A;    SQUAMOUS CELL CARCINOMA EXCISION         Social History    reports that he has never smoked. He has never used smokeless tobacco. He reports that he does not drink alcohol and does not use drugs.    Family History  's family history includes Cancer in his father; Colon cancer in his father; Heart attack in his mother.    Medications and Allergies     Medications  No outpatient medications have been marked as taking for the 7/24/23 encounter (Office Visit) with Yobany Marsh MD.       Allergies  Review of patient's allergies indicates:   Allergen Reactions    Pcn [penicillins]        Physical Examination     Vitals:    07/24/23 0907   BP: (!) 146/84   Pulse: 68   Resp: 16   Temp: 98.6 °F (37 °C)     Physical Exam  Constitutional:       General: He is not in acute distress.     Appearance: Normal appearance.   HENT:      Head: Normocephalic.      Right Ear: Tympanic membrane and ear canal normal.      Left Ear: Tympanic membrane and ear canal normal.      Nose: Nose normal.      Mouth/Throat:      Mouth: Mucous membranes are moist.      Pharynx: No oropharyngeal exudate.   Eyes:      Extraocular Movements: Extraocular movements intact.      Pupils: Pupils are equal,  round, and reactive to light.   Neck:      Comments: Patient is resting position has a neck is flexed slightly and slightly to the left.  Rotation of the left 25% of normal.  Cardiovascular:      Rate and Rhythm: Normal rate and regular rhythm.      Heart sounds: No murmur heard.  Pulmonary:      Effort: Pulmonary effort is normal.      Breath sounds: Normal breath sounds. No wheezing.   Abdominal:      General: Abdomen is flat. Bowel sounds are normal.      Palpations: Abdomen is soft.      Hernia: No hernia is present.   Musculoskeletal:         General: Normal range of motion.      Cervical back: Neck supple.      Right lower leg: No edema.      Left lower leg: No edema.   Lymphadenopathy:      Cervical: No cervical adenopathy.   Skin:     General: Skin is warm and dry.      Coloration: Skin is not jaundiced.      Findings: No lesion.   Neurological:      General: No focal deficit present.      Mental Status: He is alert and oriented to person, place, and time.      Cranial Nerves: No cranial nerve deficit.      Gait: Gait normal.   Psychiatric:         Mood and Affect: Mood normal.         Behavior: Behavior normal.         Judgment: Judgment normal.        Assessment and Plan (including Health Maintenance)      Problem List  Smart Sets  Document Outside HM   :    Plan:     1. Neck pain    -     dexAMETHasone injection 8 mg  -     HYDROcodone-acetaminophen (NORCO) 5-325 mg per tablet; Take 1 tablet by mouth every 6 (six) hours as needed for Pain.  Dispense: 15 tablet; Refill: 0          Health Maintenance Due   Topic Date Due    Hepatitis C Screening  Never done    TETANUS VACCINE  Never done    Shingles Vaccine (1 of 2) Never done    Pneumococcal Vaccines (Age 65+) (2 - PPSV23 if available, else PCV20) 09/30/2016    COVID-19 Vaccine (2 - Moderna series) 04/26/2021       Problem List Items Addressed This Visit          Cardiac/Vascular    Hypertension, essential (Chronic)     Other Visit Diagnoses       Neck  pain    -  Primary    Relevant Medications    dexAMETHasone injection 8 mg (Completed)    HYDROcodone-acetaminophen (NORCO) 5-325 mg per tablet            Health Maintenance Topics with due status: Not Due       Topic Last Completion Date    Influenza Vaccine 11/01/2019    Lipid Panel 10/21/2022       Future Appointments   Date Time Provider Department Center   8/18/2023 10:00 AM Harleen Lubin MD [facility] [place]   9/21/2023  9:30 AM [facility]   [facility]24 12[facility]30 [facility]        There are no Patient Instructions on file for this visit.  Follow up if symptoms worsen or fail to improve.     Signature:  Yobany Marsh MD      Date of encounter: 7/24/23

## 2023-08-18 ENCOUNTER — OFFICE VISIT (OUTPATIENT)
Dept: DERMATOLOGY | Facility: CLINIC | Age: 78
End: 2023-08-18
Payer: MEDICARE

## 2023-08-18 DIAGNOSIS — Z85.828 HISTORY OF NONMELANOMA SKIN CANCER: ICD-10-CM

## 2023-08-18 DIAGNOSIS — L57.0 ACTINIC KERATOSES: ICD-10-CM

## 2023-08-18 DIAGNOSIS — D48.9 NEOPLASM OF UNCERTAIN BEHAVIOR: Primary | ICD-10-CM

## 2023-08-18 PROCEDURE — 17003 DESTRUCT PREMALG LES 2-14: CPT | Mod: ,,, | Performed by: STUDENT IN AN ORGANIZED HEALTH CARE EDUCATION/TRAINING PROGRAM

## 2023-08-18 PROCEDURE — 99213 OFFICE O/P EST LOW 20 MIN: CPT | Mod: 25,,, | Performed by: STUDENT IN AN ORGANIZED HEALTH CARE EDUCATION/TRAINING PROGRAM

## 2023-08-18 PROCEDURE — 17003 DESTRUCTION, PREMALIGNANT LESIONS; SECOND THROUGH 14 LESIONS: ICD-10-PCS | Mod: ,,, | Performed by: STUDENT IN AN ORGANIZED HEALTH CARE EDUCATION/TRAINING PROGRAM

## 2023-08-18 PROCEDURE — 11102 PR TANGENTIAL BIOPSY, SKIN, SINGLE LESION: ICD-10-PCS | Mod: ,,, | Performed by: STUDENT IN AN ORGANIZED HEALTH CARE EDUCATION/TRAINING PROGRAM

## 2023-08-18 PROCEDURE — 88305 TISSUE EXAM BY PATHOLOGIST: CPT | Mod: 26,,, | Performed by: PATHOLOGY

## 2023-08-18 PROCEDURE — 17000 PR DESTRUCTION(LASER SURGERY,CRYOSURGERY,CHEMOSURGERY),PREMALIGNANT LESIONS,FIRST LESION: ICD-10-PCS | Mod: XS,,, | Performed by: STUDENT IN AN ORGANIZED HEALTH CARE EDUCATION/TRAINING PROGRAM

## 2023-08-18 PROCEDURE — 11102 TANGNTL BX SKIN SINGLE LES: CPT | Mod: ,,, | Performed by: STUDENT IN AN ORGANIZED HEALTH CARE EDUCATION/TRAINING PROGRAM

## 2023-08-18 PROCEDURE — 99213 PR OFFICE/OUTPT VISIT, EST, LEVL III, 20-29 MIN: ICD-10-PCS | Mod: 25,,, | Performed by: STUDENT IN AN ORGANIZED HEALTH CARE EDUCATION/TRAINING PROGRAM

## 2023-08-18 PROCEDURE — 11103 PR TANGENTIAL BIOPSY, SKIN, EA ADDTL LESION: ICD-10-PCS | Mod: ,,, | Performed by: STUDENT IN AN ORGANIZED HEALTH CARE EDUCATION/TRAINING PROGRAM

## 2023-08-18 PROCEDURE — 88305 PATHOLOGY, DERMATOLOGY: ICD-10-PCS | Mod: 26,,, | Performed by: PATHOLOGY

## 2023-08-18 PROCEDURE — 17000 DESTRUCT PREMALG LESION: CPT | Mod: XS,,, | Performed by: STUDENT IN AN ORGANIZED HEALTH CARE EDUCATION/TRAINING PROGRAM

## 2023-08-18 PROCEDURE — 88305 TISSUE EXAM BY PATHOLOGIST: CPT | Mod: TC,SUR | Performed by: STUDENT IN AN ORGANIZED HEALTH CARE EDUCATION/TRAINING PROGRAM

## 2023-08-18 PROCEDURE — 11103 TANGNTL BX SKIN EA SEP/ADDL: CPT | Mod: ,,, | Performed by: STUDENT IN AN ORGANIZED HEALTH CARE EDUCATION/TRAINING PROGRAM

## 2023-08-18 NOTE — PROGRESS NOTES
[facility]  Thomas Lubin MD    [address] [address]  [phone]    Fax: [phone]    Patient Name: Gris Hobbs  Medical Record Number: [id number]  PCP: Yobany Marsh MD  Age: 77 y.o. : 1945  Contact: [phone] (home)     History of Present Illness:     Gris Hobbs is a 77 y.o.  male here for follow up of history of NMSC (SCC R arm s/p excision). He has tender lesion on L hand.     The patient has no other concerns today.    Review of Systems:     Unremarkable other than mentioned above.     Physical Exam:     General: Relaxed, oriented, alert    Skin examination of the scalp, face, neck, chest, back, abdomen, upper extremities and lower extremities were normal except for as listed below            Assessment and Plan:     1. History of NMSC   Well-healed scar on R arm  No e/o recurrence   Recommend sunscreen and good photoprotection       2. Actinic Keratoses  Erythematous, scaly papules on right ear, right cheek, forehead, scalp, left ear, right hand, right arm     Plan: Liquid Nitrogen.  A total of 10 lesions were treated with liquid nitrogen for 2 freeze-thaw cycles lasting 5 seconds, located on the above locations.   The patient's consent was obtained including but not limited to risks of crusting, scabbing,  blistering, scarring, darker or lighter pigmentary change, recurrence, incomplete removal and infection.    Counseling.  Sun protective clothing and broad spectrum sunscreen can prevent the formation of AK.   AKs can be treated with cryotherapy, photodynamic therapy, imiquimod, topical 5-FU.  Actinic Keratoses are precancerous proliferations that occur within sun damaged skin. If untreated,  a small subset of AKs can develop into Squamous Cell Carcinoma.     3. Neoplasm of Uncertain Behavior x 2     A) pearly papule located on the right temple  Ddx includes: BCC    B) tender papule located on the left 2-3 inter digital  space  Ddx includes: SCC      Shave biopsy      Pre-procedure Diagnosis: as above  Post_procedure Diagnosis: same  Estimated Blood Loss: <1cc    Findings: None  Complications: None  Specimens: to pathology      Written informed consent was obtained after discussing risks including pain, bleeding, infection, recurrence and scarring. The biopsy site was sterilely prepped with alcohol, which was allowed to dry completely, then locally infiltrated with 1% lidocaine with epinephrine, ~3 cc total. A shave biopsy was obtained using a Dermablade/15 and the specimen was sent to dermatopathology. Aluminum chloride was used for hemostasis. Antibiotic ointment and a clean dressing were applied. The patient tolerated the procedure well without complications. Verbal and written wound care instructions were given.    Thomas Lubin MD       Return to clinic in 4 months     AVS printed with patient instructions     Thomas Lubin MD   Mohs Surgery/Dermatologic Oncology  Dermatology

## 2023-08-18 NOTE — PATIENT INSTRUCTIONS
"Liquid Nitrogen Wound Care     - the areas treated with liquid nitrogen may form sores, blisters, and scabs. This is normal   - if a blister forms, please keep it intact and do not rupture it. It will resolve within a few days   - please keep petrolatum ointment to the area once to twice daily. You can cover with a bandage if you wish, but it is usually not necessary   - the area may be painful for a few days. We recommend ice, or over the counter tylenol or NSAIDs (such as ibuprofen or naproxen, if you are able to take these)   - this may result in a scar or a "hypopigmented" or lighter area of skin. This may or may not resolve with time   - please call our clinic if the lesion does not resolve, as this can be treated again   Biopsy Site Care  Starting tomorrow you may shower and wash the area with antibacterial soap  Pat dry and apply vaseline and a bandaid  The area will be irritated, sore, slightly red, and may itch, sting, or burn  Do not apply make-up to the area until it is healed  There will be a scar anytime we cut the skin to the level of the dermis, which occurs in a biopsy   The area will take 1-2 weeks to heal  No soaking in the tub or hot tub for one week    "

## 2023-09-18 ENCOUNTER — PROCEDURE VISIT (OUTPATIENT)
Dept: DERMATOLOGY | Facility: CLINIC | Age: 78
End: 2023-09-18
Payer: MEDICARE

## 2023-09-18 VITALS — SYSTOLIC BLOOD PRESSURE: 138 MMHG | HEART RATE: 67 BPM | DIASTOLIC BLOOD PRESSURE: 80 MMHG

## 2023-09-18 DIAGNOSIS — C44.629 SQUAMOUS CELL CARCINOMA OF LEFT HAND: ICD-10-CM

## 2023-09-18 DIAGNOSIS — C44.319 BASAL CELL CARCINOMA (BCC) OF RIGHT TEMPLE REGION: Primary | ICD-10-CM

## 2023-09-18 PROCEDURE — 15240 FTH/GFT F/C/C/M/N/AX/G/H/F20: CPT | Mod: 51,,, | Performed by: STUDENT IN AN ORGANIZED HEALTH CARE EDUCATION/TRAINING PROGRAM

## 2023-09-18 PROCEDURE — 17311 MOHS 1 STAGE H/N/HF/G: CPT | Mod: ,,, | Performed by: STUDENT IN AN ORGANIZED HEALTH CARE EDUCATION/TRAINING PROGRAM

## 2023-09-18 PROCEDURE — 15240 PR FULL THICK GRFT HEAD,FAC,HAND <20SQC: ICD-10-PCS | Mod: 51,,, | Performed by: STUDENT IN AN ORGANIZED HEALTH CARE EDUCATION/TRAINING PROGRAM

## 2023-09-18 PROCEDURE — 17312: ICD-10-PCS | Mod: ,,, | Performed by: STUDENT IN AN ORGANIZED HEALTH CARE EDUCATION/TRAINING PROGRAM

## 2023-09-18 PROCEDURE — 13132 CMPLX RPR F/C/C/M/N/AX/G/H/F: CPT | Mod: XS,51,, | Performed by: STUDENT IN AN ORGANIZED HEALTH CARE EDUCATION/TRAINING PROGRAM

## 2023-09-18 PROCEDURE — 17311: ICD-10-PCS | Mod: ,,, | Performed by: STUDENT IN AN ORGANIZED HEALTH CARE EDUCATION/TRAINING PROGRAM

## 2023-09-18 PROCEDURE — 13132 PR RECMPL WND HEAD,FAC,HAND 2.6-7.5 CM: ICD-10-PCS | Mod: XS,51,, | Performed by: STUDENT IN AN ORGANIZED HEALTH CARE EDUCATION/TRAINING PROGRAM

## 2023-09-18 PROCEDURE — 17312 MOHS ADDL STAGE: CPT | Mod: ,,, | Performed by: STUDENT IN AN ORGANIZED HEALTH CARE EDUCATION/TRAINING PROGRAM

## 2023-09-18 PROCEDURE — 99499 NO LOS: ICD-10-PCS | Mod: ,,, | Performed by: STUDENT IN AN ORGANIZED HEALTH CARE EDUCATION/TRAINING PROGRAM

## 2023-09-18 PROCEDURE — 99499 UNLISTED E&M SERVICE: CPT | Mod: ,,, | Performed by: STUDENT IN AN ORGANIZED HEALTH CARE EDUCATION/TRAINING PROGRAM

## 2023-09-18 NOTE — PROGRESS NOTES
Mohs Surgery Operative Note    Patient name: Gris Hobbs  Date: 09/18/2023    Mohs accession #:   Previous dermpath accession #: [id number]  Location: right temple  Preop diagnosis:BCC  Postop diagnosis: SCCIS  Mohs AUC score: 9  Number of stages: 2  Preop size: 1.2 x 0.8 cm  Postop size: 1.9 x 1.2 cm  Depth of defect: fascia  Repair type: complex     Surgeon and Pathologist: HOLLIE Lubin MD     Indications for Mohs Surgery    Removal of the patient's tumor is complicated by the following clinical features: Clinical area critical for tissue conservation (Area H: central face, eyelids, eyebrows, nose, lips, chin, ear, periauricular, temple, genitalia, hands, feet, ankles, nail units and areola)    Based on my medical judgement, Mohs surgery is the most appropriate treatment for this cancer compared to other treatments. I discussed alternative treatments to Mohs surgery and specifically discussed the risks and benefits of curettage, excision with permanent sections, and foregoing treatment. The rationale for Mohs was explained to the patient and consent was obtained. The risks, benefits and alternatives to therapy were discussed in detail. Specifically, the risks of infection, scarring, bleeding, prolonged wound healing, incomplete removal, allergy to anesthesia, nerve injury and recurrence were addressed. Prior to the procedure, the treatment site was clearly identified and confirmed by the patient. All components of Universal Protocol/PAUSE Rule completed. NISH Lubin MD operated in two distinct and integrated capacities as the surgeon and pathologist.    STAGE I:  The patient was placed on the operating table. The cancer was identified and outlined. The entire surgical field was prepped with chlorhexidine The surgical site was anesthetized using Lidocaine 1% with epinephrine 1:100,000 buffered with sodium bicarbonate 8.4% in a 1:10 ratio.    The area of clinically apparent tumor was debulked with a  2 mm curette. The layer of tissue was then surgically excised using a #15 blade and was then transferred onto a specimen sheet maintaining the orientation of the specimen. Hemostasis was obtained using monopolar electrodesiccation. The wound site was then covered with a dressing while the tissue samples were processed for examination.    The specimen was oriented, mapped and divided. Each section was then inked and processed in the Mohs lab using the Mohs protocol and submitted for frozen section. The histopathologic sections were reviewed by the surgeon in conjunction with the reference map.     Total blocks: 1 Total slides: 2    Frozen section analysis revealed: residual tumor present on stage 1. Tumor was indicated in red on the reference map.    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 Using a similar technique to that described above, a thin layer of tissue was removed from all areas where tumor was visible on the previous stage. The tissue was again oriented, mapped, dyed, and processed as above. Histopathologic sections were reviewed in conjunction with the reference map.     Total blocks: 1 Total slides: 1    Frozen section analysis revealed: No additional tumor identified on microscopic examination by the surgeon. Histology: No malignant cells seen in the sections examined.    Additional Histologic Findings: None    REPAIR: Complex Closure    Primary Surgeon : HOLLIE Lubin MD   Repair Size: 4 cm  Sutures:  4-0 monocryl; 5-0 prolene   Width of underminin.5 cm   Free margin involved: no  Presence of exposed bone/cartilage/tendon/named neurovascular structure: yes,  superficial temporal artery   Use of retention sutures: No  Indication for complex repair: exposed neurovascular structure     The defect was identified and a marking pen was used to plan the repair. The area was infiltrated with Lidocaine 1% with epinephrine 1:100,000 buffered with sodium bicarbonate 8.4% in a 1:10 ratio, prepped with chlorhexidine and draped with sterile towels.  The wound was debeveled and undermined widely to the width listed as above. Cones were excised within relaxed skin tension lines on both sides of the defect. Hemostasis was obtained using monopolar electrodesiccation. The dermis and subcutaneous tissue were then approximated using buried vertical mattress sutures. Percutaneous simple running sutures were carefully placed for maximum eversion and meticulous wound edge approximation. Careful attention was paid to avoid distorting any nearby free margins.    The wound was cleansed with saline and ointment was applied along the wound surface. A sterile pressure dressing was applied. Wound care instructions were given verbally and in writing. The patient left the operating suite in stable condition. Patient was informed that additional refinement of the resulting surgical scar may be used as a second stage of this reconstruction.    Thomas Lubin MD   Mohs Surgery, Dermatologic Oncology

## 2023-09-18 NOTE — PROGRESS NOTES
Mohs Surgery Operative Note    Patient name: Gris Hobbs  Date: 09/18/2023    Mohs accession #:   Previous dermpath accession #: [id number]  Location: left hand  Preop diagnosis:SCC  Postop diagnosis: Same  Mohs AUC score: 9  Number of stages: 2  Preop size: 1.3 x 1.2 cm   Postop size: 2.1 x 1.6 cm   Depth of defect: fascia  Repair type: Burow's graft     Surgeon and Pathologist: HOLLIE Lubin MD     Indications for Mohs Surgery    Removal of the patient's tumor is complicated by the following clinical features: Clinical area critical for tissue conservation (Area H: central face, eyelids, eyebrows, nose, lips, chin, ear, periauricular, temple, genitalia, hands, feet, ankles, nail units and areola) and Poorly-defined clinical tumor borders    Based on my medical judgement, Mohs surgery is the most appropriate treatment for this cancer compared to other treatments. I discussed alternative treatments to Mohs surgery and specifically discussed the risks and benefits of curettage, excision with permanent sections, and foregoing treatment. The rationale for Mohs was explained to the patient and consent was obtained. The risks, benefits and alternatives to therapy were discussed in detail. Specifically, the risks of infection, scarring, bleeding, prolonged wound healing, incomplete removal, allergy to anesthesia, nerve injury and recurrence were addressed. Prior to the procedure, the treatment site was clearly identified and confirmed by the patient. All components of Universal Protocol/PAUSE Rule completed. NISH Lubin MD operated in two distinct and integrated capacities as the surgeon and pathologist.    STAGE I:  The patient was placed on the operating table. The cancer was identified and outlined. The entire surgical field was prepped with chlorhexidine The surgical site was anesthetized using Lidocaine 1% with epinephrine 1:100,000 buffered with sodium bicarbonate 8.4% in a 1:10 ratio.    The area of  clinically apparent tumor was debulked with a 2 mm curette. The layer of tissue was then surgically excised using a #15 blade and was then transferred onto a specimen sheet maintaining the orientation of the specimen. Hemostasis was obtained using monopolar electrodesiccation. The wound site was then covered with a dressing while the tissue samples were processed for examination.    The specimen was oriented, mapped and divided. Each section was then inked and processed in the Mohs lab using the Mohs protocol and submitted for frozen section. The histopathologic sections were reviewed by the surgeon in conjunction with the reference map.     Total blocks: 1 Total slides: 3    Frozen section analysis revealed: residual tumor present on stage 1. Tumor was indicated in red on the reference map.    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 Using a similar technique to that described above, a thin layer of tissue was removed from all areas where tumor was visible on the previous stage. The tissue was again oriented, mapped, dyed, and processed as above. Histopathologic sections were reviewed in conjunction with the reference map.     Total blocks: 1 Total slides: 2    Frozen section analysis revealed: No additional tumor identified on microscopic examination by the surgeon. Histology: No malignant cells seen in the sections examined.    Additional Histologic Findings: Actinic keratosis was noted at the peripheral edge. This was not pursed with an additonal Mohs layers as it was not considered a malignancy and therefore not indicated.     REPAIR: Burows full thickness skin graft and Partial  Intermediate Repair    Primary Surgeon : HOLLIE Lubin  Indication for skin graft:  Suitable location for skin grafting; Avoid high tension repair; Not feasible to close primarily; Prevent free margin distortion    Repair Size: 1.5 x 1.0 cm (graft), 3 cm (donor)  Sutures:  4-0 monocryl; 5-0 prolene     The defect was identified and a marking pen was used to plan the repair. The area was infiltrated with Lidocaine 1% with epinephrine 1:100,000 buffered with sodium bicarbonate 8.4% in a 1:10 ratio, prepped with chlorhexidine  and draped with sterile towels.  The defect was trimmed and debeveled. A cone of tissue with similar characteristics to the sacrificed tissue was identified adjacent to the defect for use as a FTSG. The graft was incised sharply using a #15 blade to adipose, excised, thinned and trimmed. Hemostasis was obtained using monopolar electrodesiccation. The donor site was undermined widely and approximated with buried vertical mattress sutures placed in the dermis and subcutaneous tissue. The graft was meticulously secured to the recipient site with prolene sutures. Percutaneous simple running sutures were carefully placed for maximum eversion and meticulous wound edge approximation.     The wound was cleansed with saline and ointment was applied along the wound surface. A sterile pressure dressing was applied. Wound care instructions were given verbally and in writing. The patient left the operating suite in stable condition. Patient was informed that additional refinement of the resulting surgical scar may be used as a second stage of this reconstruction.    Thomas Lubin MD   Mohs Surgery/Dermatologic Oncology

## 2023-09-18 NOTE — PROGRESS NOTES
Mohs Surgery Consult Note    Gris Hobbs is a 77 y.o. male who is referred by Dr. Lubin for evaluation of a BCC on the right temple and SCC on the left hand.     Recurrent skin cancer: No     Preoperative Risk Factors:  Current Anticoagulants: Yes  eliquis, asa 81 mg  Endocarditis / Rheumatic Fever hx: No  Immunocompromised: No  Prosthetic joint: No  Congenital heart defect: No  Prosthetic heart valve: No  Diabetic: No  Transplant: No  Pacemaker: No  Defibrillator:  No  Prior problem with local anesthesia: No  Tobacco History: No]  Clindamycin Allergy: No  Pregnant: no      Transmissible Diseases:  HIV No  Hepatitis B or C  No    Exam:  Limited skin exam is normal except for a  BCC  located on the right temple and SCC on the left hand  .    Pathologic Findings:  Accession # [id number]  Diagnosis: BCC&SCC    Assessment and Plan:  Treatment Options : The various treatment options for skin cancer removal were reviewed with the patient in detail. These include Mohs surgery with its high cure rate, excisional surgery, destructive treatment, radiation therapy, and various topical therapies.  Given the indications and high cure rate, the patient has agreed to proceed with Mohs.  Risks and Benefits : The rationale for Mohs was explained to the patient. The risks and benefits to therapy were discussed in detail. Specifically, the risks of infection, scarring, bleeding, dehiscence, hematoma, prolonged wound healing, incomplete removal, allergy to anesthesia, nerve injury, inability to clear the tumor, and recurrence were addressed. The treatment site was clearly identified and confirmed by the patient.    Plan:  Mohs Micrographic Surgery x 2       Thomas Lubin MD   Mohs Surgery/Dermatologic Oncology

## 2023-09-18 NOTE — PATIENT INSTRUCTIONS

## 2023-09-20 NOTE — PROGRESS NOTES
Gris Hobbs is a 77 y.o. male here for No chief complaint on file.        PCP: Yobany Marsh  Referring Provider: Vania Grubbs, Alexander  [address] [place] [address] [place] [phone]     HPI:  Presents for follow-up due to reflux and minimally delayed gastric emptying study.  Last EGD dilation was on 10/07/2022, report reviewed, negative H pylori, mild chronic gastritis with intestinal metaplasia.  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  B12 level was checked and was 82.  He is now receiving B12 injections monthly.  He did have a gastric emptying study on 10/03/2022, report reviewed, GES is delayed.  We did discuss small frequent meals and avoidance of foods such as raw fruits and vegetables.  He does report increased reflux symptoms when he eats Mexican.  He is taking Protonix.  Reports that he has tried to reduce intake at the evening meal.  States that this has improved his symptoms.  Last colonoscopy was 09/21/2020, report reviewed, tubular adenoma x2 removed.  He denies any hematochezia or melena today.  Continues to have some constipation.  Reports that he is drinking prune juice.  Advised that he may also take MiraLax powder if he needs it.  States that he prefers to try natural laxatives.          ROS:  Review of Systems   Constitutional:  Negative for activity change, appetite change, fatigue, fever and unexpected weight change.   HENT:  Negative for trouble swallowing.    Respiratory:  Negative for cough.    Cardiovascular:  Negative for chest pain.   Gastrointestinal:  Positive for constipation and reflux. Negative for abdominal distention, abdominal pain, blood in stool, change in bowel habit, diarrhea, nausea and vomiting.   Musculoskeletal:  Negative for gait problem.   Integumentary:  Positive for wound (recent skin cancer removal). Negative for color change.   Psychiatric/Behavioral:  Negative  for sleep disturbance. The patient is not nervous/anxious.           PMHX:  has a past medical history of Atrial fibrillation, Coronary artery disease involving native coronary artery of native heart without angina pectoris, Gout, Hyperlipidemia, Hypertension, Ischemic cardiomyopathy, and NSTEMI (non-ST elevated myocardial infarction) (10/2019).    PSHX:  has a past surgical history that includes Appendectomy; Biopsy with transrectal ultrasound (TRUS) guidance; Cardiac catheterization; Laparoscopic cholecystectomy (N/A, 01/04/2023); Excision basal cell carcinoma; and Squamous cell carcinoma excision.    PFHX: family history includes Cancer in his father; Colon cancer in his father; Heart attack in his mother.    PSlHX:  reports that he has never smoked. He has never used smokeless tobacco. He reports that he does not drink alcohol and does not use drugs.        Review of patient's allergies indicates:   Allergen Reactions    Pcn [penicillins]        Medication List with Changes/Refills   Current Medications    ALLOPURINOL (ZYLOPRIM) 300 MG TABLET    Take 1 tablet (300 mg total) by mouth once daily.    APIXABAN (ELIQUIS) 5 MG TAB    Take 1 tablet (5 mg total) by mouth 2 (two) times daily.    ASPIRIN (ECOTRIN) 81 MG EC TABLET    Take 81 mg by mouth once daily.    CHOLECALCIFEROL, VITAMIN D3, (VITAMIN D3) 125 MCG (5,000 UNIT) TAB    Take 5,000 Units by mouth once daily.    CYANOCOBALAMIN 1,000 MCG/ML INJECTION    Inject 1 mL (1,000 mcg total) into the muscle every 30 days.    DILTIAZEM (CARDIZEM CD) 240 MG 24 HR CAPSULE    Take 1 capsule (240 mg total) by mouth once daily.    EZETIMIBE (ZETIA) 10 MG TABLET    Take 1 tablet (10 mg total) by mouth once daily.    HYDROCODONE-ACETAMINOPHEN (NORCO) 5-325 MG PER TABLET    Take 1 tablet by mouth every 6 (six) hours as needed for Pain.    HYDROCODONE-ACETAMINOPHEN (NORCO) 7.5-325 MG PER TABLET    Take 1 tablet by mouth every 6 (six) hours as needed for Pain.    METOPROLOL  "SUCCINATE (TOPROL-XL) 50 MG 24 HR TABLET    Take 1 tablet (50 mg total) by mouth once daily.    MULTIVITAMIN (ONE DAILY MULTIVITAMIN) PER TABLET    Take 1 tablet by mouth once daily.    MUPIROCIN (BACTROBAN) 2 % OINTMENT    Apply topically once daily.    ROSUVASTATIN (CRESTOR) 5 MG TABLET    Take 1 tablet (5 mg total) by mouth once daily.   Changed and/or Refilled Medications    Modified Medication Previous Medication    PANTOPRAZOLE (PROTONIX) 40 MG TABLET pantoprazole (PROTONIX) 40 MG tablet       Take 1 tablet (40 mg total) by mouth once daily.    Take 1 tablet (40 mg total) by mouth once daily.        Objective Findings:  Vital Signs:  BP (!) 156/91   Pulse 68   Ht 5' 10" (1.778 m)   Wt 86.4 kg (190 lb 6.4 oz)   BMI 27.32 kg/m²  Body mass index is 27.32 kg/m².    Physical Exam:  Physical Exam  Vitals and nursing note reviewed.   Constitutional:       General: He is not in acute distress.     Appearance: Normal appearance.   HENT:      Mouth/Throat:      Mouth: Mucous membranes are moist.   Cardiovascular:      Rate and Rhythm: Normal rate.   Pulmonary:      Effort: Pulmonary effort is normal.      Breath sounds: No wheezing, rhonchi or rales.   Abdominal:      General: Bowel sounds are normal. There is no distension.      Palpations: Abdomen is soft. There is no mass.      Tenderness: There is no abdominal tenderness. There is no guarding.      Hernia: No hernia is present.   Skin:     General: Skin is warm and dry.      Coloration: Skin is not jaundiced or pale.   Neurological:      Mental Status: He is alert and oriented to person, place, and time.   Psychiatric:         Mood and Affect: Mood normal.          Labs:  Lab Results   Component Value Date    WBC 7.24 08/10/2023    HGB 14.0 08/10/2023    HCT 44.4 08/10/2023    MCV 96.9 (H) 08/10/2023    RDW 14.0 08/10/2023     08/10/2023    LYMPH 20.2 (L) 08/10/2023    LYMPH 1.46 08/10/2023    MONO 9.5 (H) 08/10/2023    EOS 0.18 08/10/2023    BASO 0.06 " 08/10/2023     Lab Results   Component Value Date     08/10/2023    K 4.5 08/10/2023     08/10/2023    CO2 30 08/10/2023     (H) 08/10/2023    BUN 15 08/10/2023    CREATININE 0.98 08/10/2023    CALCIUM 9.5 08/10/2023    PROT 7.3 08/10/2023    ALBUMIN 3.8 08/10/2023    BILITOT 0.9 08/10/2023    ALKPHOS 93 08/10/2023    AST 22 08/10/2023    ALT 24 08/10/2023         Imaging: No results found.      Assessment:  Gris Hobbs is a 77 y.o. male here with:  1. Delayed gastric emptying    2. Gastroesophageal reflux disease, unspecified whether esophagitis present          Recommendations:  1. Continue Protonix  2. Avoid spicy, greasy foods  Avoid caffeine, citric acid, chocolate, peppermint, and carbonated drinks  Do not lay down within 3 hours of eating  Exercise 150 minutes per week  Increase fluid to 64 ounces daily  Avoid antiinflammatory medications such as motrin, advil, aleve, ibuprofen, and BC powder      Follow up in about 6 months (around 3/21/2024).      Order summary:  Orders Placed This Encounter    pantoprazole (PROTONIX) 40 MG tablet       Thank you for allowing me to participate in the care of Gris Hobbs.      EVELYN Hamlin

## 2023-09-21 ENCOUNTER — OFFICE VISIT (OUTPATIENT)
Dept: GASTROENTEROLOGY | Facility: CLINIC | Age: 78
End: 2023-09-21
Payer: MEDICARE

## 2023-09-21 VITALS
DIASTOLIC BLOOD PRESSURE: 91 MMHG | HEIGHT: 70 IN | HEART RATE: 68 BPM | WEIGHT: 190.38 LBS | SYSTOLIC BLOOD PRESSURE: 156 MMHG | BODY MASS INDEX: 27.25 KG/M2

## 2023-09-21 DIAGNOSIS — K30 DELAYED GASTRIC EMPTYING: Primary | ICD-10-CM

## 2023-09-21 DIAGNOSIS — K21.9 GASTROESOPHAGEAL REFLUX DISEASE, UNSPECIFIED WHETHER ESOPHAGITIS PRESENT: ICD-10-CM

## 2023-09-21 PROCEDURE — 99214 OFFICE O/P EST MOD 30 MIN: CPT | Mod: S$PBB,,, | Performed by: NURSE PRACTITIONER

## 2023-09-21 PROCEDURE — 99214 OFFICE O/P EST MOD 30 MIN: CPT | Mod: PBBFAC | Performed by: NURSE PRACTITIONER

## 2023-09-21 PROCEDURE — 99214 PR OFFICE/OUTPT VISIT, EST, LEVL IV, 30-39 MIN: ICD-10-PCS | Mod: S$PBB,,, | Performed by: NURSE PRACTITIONER

## 2023-09-21 RX ORDER — PANTOPRAZOLE SODIUM 40 MG/1
40 TABLET, DELAYED RELEASE ORAL DAILY
Qty: 90 TABLET | Refills: 3 | Status: SHIPPED | OUTPATIENT
Start: 2023-09-21 | End: 2024-09-20

## 2023-09-21 NOTE — PATIENT INSTRUCTIONS
Avoid spicy, greasy foods  Avoid caffeine, citric acid, chocolate, peppermint, and carbonated drinks  Do not lay down within 3 hours of eating  Exercise 150 minutes per week  Increase fluid to 64 ounces daily  Avoid antiinflammatory medications such as motrin, advil, aleve, ibuprofen, and BC powder

## 2023-09-27 ENCOUNTER — CLINICAL SUPPORT (OUTPATIENT)
Dept: DERMATOLOGY | Facility: CLINIC | Age: 78
End: 2023-09-27
Payer: MEDICARE

## 2023-09-27 DIAGNOSIS — Z48.89 ENCOUNTER FOR POST SURGICAL WOUND CHECK: Primary | ICD-10-CM

## 2023-09-27 DIAGNOSIS — L08.9 SKIN INFECTION: ICD-10-CM

## 2023-09-27 PROCEDURE — 87070 CULTURE, WOUND: ICD-10-PCS | Mod: ,,, | Performed by: CLINICAL MEDICAL LABORATORY

## 2023-09-27 PROCEDURE — 87070 CULTURE OTHR SPECIMN AEROBIC: CPT | Mod: ,,, | Performed by: CLINICAL MEDICAL LABORATORY

## 2023-09-27 NOTE — PROGRESS NOTES
Date: 09/18/2023     Mohs accession #:   Previous dermpath accession #: [id number]  Location: left hand  Preop diagnosis:SCC  Postop diagnosis: Same  Mohs AUC score: 9  Number of stages: 2  Preop size: 1.3 x 1.2 cm   Postop size: 2.1 x 1.6 cm   Depth of defect: fascia  Repair type: Burow's graft    Mohs accession #:   Previous dermpath accession #: [id number]  Location: right temple  Preop diagnosis:BCC  Postop diagnosis: SCCIS  Mohs AUC score: 9  Number of stages: 2  Preop size: 1.2 x 0.8 cm  Postop size: 1.9 x 1.2 cm  Depth of defect: fascia  Repair type: complex     Patient here fore wound check. Incision sites healing well. Patient states graft site on left hand has had some drainage. Culture obtained. Patient to return to clinic Monday for suture removal.     Sera Whitney RN

## 2023-09-29 LAB — MICROORGANISM SPEC CULT: NORMAL

## 2023-10-02 ENCOUNTER — CLINICAL SUPPORT (OUTPATIENT)
Dept: DERMATOLOGY | Facility: CLINIC | Age: 78
End: 2023-10-02
Payer: MEDICARE

## 2023-10-02 DIAGNOSIS — Z48.02 VISIT FOR SUTURE REMOVAL: Primary | ICD-10-CM

## 2023-10-02 PROCEDURE — 99024 PR POST-OP FOLLOW-UP VISIT: ICD-10-PCS | Mod: ,,, | Performed by: STUDENT IN AN ORGANIZED HEALTH CARE EDUCATION/TRAINING PROGRAM

## 2023-10-02 PROCEDURE — 99024 POSTOP FOLLOW-UP VISIT: CPT | Mod: ,,, | Performed by: STUDENT IN AN ORGANIZED HEALTH CARE EDUCATION/TRAINING PROGRAM

## 2023-10-02 NOTE — PROGRESS NOTES
Mohs accession #:   Previous dermpath accession #: [id number]  Location: left hand  Preop diagnosis:SCC  Postop diagnosis: Same  Mohs AUC score: 9  Number of stages: 2  Preop size: 1.3 x 1.2 cm   Postop size: 2.1 x 1.6 cm   Depth of defect: fascia  Repair type: Burow's graft      Mohs accession #:   Previous dermpath accession #: [id number]  Location: right temple  Preop diagnosis:BCC  Postop diagnosis: SCCIS  Mohs AUC score: 9  Number of stages: 2  Preop size: 1.2 x 0.8 cm  Postop size: 1.9 x 1.2 cm  Depth of defect: fascia  Repair type: complex    Patient is here for SR. Incisions are healing well no s/s of infection noted. Denies pain SR tolerated well.              Blaise'Surya Walker, SYN/IVC

## 2023-10-13 NOTE — PROGRESS NOTES
"Patient's call transferred to author  Patient updating provider on urinary status    States symptoms improved on the antibiotic and then returned and progressively worsened after finishing the antibiotics - today sensation is very noticeable    Reporting a constant sensation of needing to urinate   States it feels like she is in her third trimester of pregnancy and a baby is sitting on her bladder - patient is not currently pregnant   Unsure if there is blood in her urine as she is currently on her period     Denies flank pain  Denies fevers    Patient has a history of kidney disease and is concerned about symptom above      Patient also reporting an \"odd/hard to explain\" sensation in bilateral arms   States this has been ongoing for months and seems to be progressing from onset  States the sensation stays in her hands and arms - does not radiate   Denies numbness/tingling  Reporting full ROM  Does report intermittent bouts of weakness in her hands - states opening a chip bag can be difficult     Patient looking for provider input regarding symptoms - most concerned regarding urinary symptom    Inder Pardo RN    " Patient examined, record reviewed, agree with findings and recommendations as documented above.

## 2023-11-09 DIAGNOSIS — Z71.89 COMPLEX CARE COORDINATION: ICD-10-CM

## 2023-11-16 DIAGNOSIS — E53.8 B12 DEFICIENCY: Primary | ICD-10-CM

## 2023-11-16 RX ORDER — CYANOCOBALAMIN 1000 UG/ML
1000 INJECTION, SOLUTION INTRAMUSCULAR; SUBCUTANEOUS
Qty: 1 ML | Refills: 4 | Status: SHIPPED | OUTPATIENT
Start: 2023-11-16 | End: 2024-03-18

## 2023-11-17 DIAGNOSIS — I48.0 PAROXYSMAL ATRIAL FIBRILLATION: ICD-10-CM

## 2023-12-19 ENCOUNTER — OFFICE VISIT (OUTPATIENT)
Dept: DERMATOLOGY | Facility: CLINIC | Age: 78
End: 2023-12-19
Payer: MEDICARE

## 2023-12-19 DIAGNOSIS — Z85.828 HISTORY OF NONMELANOMA SKIN CANCER: ICD-10-CM

## 2023-12-19 DIAGNOSIS — D48.9 NEOPLASM OF UNCERTAIN BEHAVIOR: ICD-10-CM

## 2023-12-19 DIAGNOSIS — L57.0 ACTINIC KERATOSES: Primary | ICD-10-CM

## 2023-12-19 PROCEDURE — 88305 TISSUE EXAM BY PATHOLOGIST: CPT | Mod: TC,SUR | Performed by: STUDENT IN AN ORGANIZED HEALTH CARE EDUCATION/TRAINING PROGRAM

## 2023-12-19 PROCEDURE — 88305 PATHOLOGY, DERMATOLOGY: ICD-10-PCS | Mod: 26,,, | Performed by: PATHOLOGY

## 2023-12-19 PROCEDURE — 88305 TISSUE EXAM BY PATHOLOGIST: CPT | Mod: 26,,, | Performed by: PATHOLOGY

## 2023-12-19 PROCEDURE — 99213 PR OFFICE/OUTPT VISIT, EST, LEVL III, 20-29 MIN: ICD-10-PCS | Mod: 25,,, | Performed by: STUDENT IN AN ORGANIZED HEALTH CARE EDUCATION/TRAINING PROGRAM

## 2023-12-19 PROCEDURE — 17004 PR DESTRUCTION, PREMALIGNANT LESIONS; 15 OR MORE LESIONS: ICD-10-PCS | Mod: ,,, | Performed by: STUDENT IN AN ORGANIZED HEALTH CARE EDUCATION/TRAINING PROGRAM

## 2023-12-19 PROCEDURE — 99213 OFFICE O/P EST LOW 20 MIN: CPT | Mod: 25,,, | Performed by: STUDENT IN AN ORGANIZED HEALTH CARE EDUCATION/TRAINING PROGRAM

## 2023-12-19 PROCEDURE — 11103 TANGNTL BX SKIN EA SEP/ADDL: CPT | Mod: ,,, | Performed by: STUDENT IN AN ORGANIZED HEALTH CARE EDUCATION/TRAINING PROGRAM

## 2023-12-19 PROCEDURE — 11102 TANGNTL BX SKIN SINGLE LES: CPT | Mod: XS,,, | Performed by: STUDENT IN AN ORGANIZED HEALTH CARE EDUCATION/TRAINING PROGRAM

## 2023-12-19 PROCEDURE — 11102 PR TANGENTIAL BIOPSY, SKIN, SINGLE LESION: ICD-10-PCS | Mod: XS,,, | Performed by: STUDENT IN AN ORGANIZED HEALTH CARE EDUCATION/TRAINING PROGRAM

## 2023-12-19 PROCEDURE — 17004 DESTROY PREMAL LESIONS 15/>: CPT | Mod: ,,, | Performed by: STUDENT IN AN ORGANIZED HEALTH CARE EDUCATION/TRAINING PROGRAM

## 2023-12-19 PROCEDURE — 11103 PR TANGENTIAL BIOPSY, SKIN, EA ADDTL LESION: ICD-10-PCS | Mod: ,,, | Performed by: STUDENT IN AN ORGANIZED HEALTH CARE EDUCATION/TRAINING PROGRAM

## 2023-12-19 RX ORDER — AMIODARONE HYDROCHLORIDE 200 MG/1
200 TABLET ORAL
COMMUNITY
Start: 2023-11-27 | End: 2024-02-25

## 2023-12-19 NOTE — PROGRESS NOTES
[facility]  Thomas Lubin MD    [address] [place] [address]  [phone]    Fax: [phone]    Patient Name: Gris Hbobs  Medical Record Number: [id number]  PCP: Yobany Marsh MD  Age: 78 y.o. : 1945  Contact: [phone] (home)     History of Present Illness:     Gris Hobbs is a 78 y.o.  male here for follow up of history of NMSC (most recent SCC left hand s/p Mohs 23). Patient is concerned with lesion on right forearm and L hand.     The patient has no other concerns today.    Review of Systems:     Unremarkable other than mentioned above.     Physical Exam:     General: Relaxed, oriented, alert    Skin examination of the scalp, face, neck, chest, back, abdomen, upper extremities and lower extremities were normal except for as listed below            Assessment and Plan:     1. History of NMSC   Well-healed scar on left hand  No e/o recurrence   Recommend sunscreen and good photoprotection       2. Actinic Keratoses  Erythematous, scaly papules on right arm, right hand, forehead, left hand, left arm, right cheek     Plan:   - PDT to face, arms and scalp  Liquid Nitrogen.  A total of 17 lesions were treated with liquid nitrogen for 2 freeze-thaw cycles lasting 5 seconds, located on the above locations.   The patient's consent was obtained including but not limited to risks of crusting, scabbing,  blistering, scarring, darker or lighter pigmentary change, recurrence, incomplete removal and infection.    Counseling.  Sun protective clothing and broad spectrum sunscreen can prevent the formation of AK.   AKs can be treated with cryotherapy, photodynamic therapy, imiquimod, topical 5-FU.  Actinic Keratoses are precancerous proliferations that occur within sun damaged skin. If untreated,  a small subset of AKs can develop into Squamous Cell Carcinoma.      3. Neoplasm of Uncertain Behavior x 2     A) tender papule located on the left 3rd  MCP  Ddx includes: SCC vs suture reaction    B) tender papule located on the left radial forearm  Ddx includes: SCC      Shave biopsy  x 2     Pre-procedure Diagnosis: as above  Post_procedure Diagnosis: same  Estimated Blood Loss: <1cc    Findings: None  Complications: None  Specimens: to pathology      Written informed consent was obtained after discussing risks including pain, bleeding, infection, recurrence and scarring. The biopsy site was sterilely prepped with alcohol, which was allowed to dry completely, then locally infiltrated with 1% lidocaine with epinephrine, ~3 cc total. A shave biopsy was obtained using a Dermablade/15 and the specimen was sent to dermatopathology. Aluminum chloride was used for hemostasis. Antibiotic ointment and a clean dressing were applied. The patient tolerated the procedure well without complications. Verbal and written wound care instructions were given.    Thomas Lubin MD     Return to clinic in 4 months.     AVS printed with patient instructions     Thomas Lubin MD   Mohs Surgery/Dermatologic Oncology  Dermatology

## 2024-02-05 ENCOUNTER — PROCEDURE VISIT (OUTPATIENT)
Dept: DERMATOLOGY | Facility: CLINIC | Age: 79
End: 2024-02-05
Payer: MEDICARE

## 2024-02-05 VITALS — HEART RATE: 60 BPM | SYSTOLIC BLOOD PRESSURE: 157 MMHG | DIASTOLIC BLOOD PRESSURE: 74 MMHG

## 2024-02-05 DIAGNOSIS — C44.622 SQUAMOUS CELL CARCINOMA OF ARM, RIGHT: Primary | ICD-10-CM

## 2024-02-05 PROCEDURE — 17313 MOHS 1 STAGE T/A/L: CPT | Mod: ,,, | Performed by: STUDENT IN AN ORGANIZED HEALTH CARE EDUCATION/TRAINING PROGRAM

## 2024-02-05 PROCEDURE — 12032 INTMD RPR S/A/T/EXT 2.6-7.5: CPT | Mod: 51,,, | Performed by: STUDENT IN AN ORGANIZED HEALTH CARE EDUCATION/TRAINING PROGRAM

## 2024-02-05 PROCEDURE — 99499 UNLISTED E&M SERVICE: CPT | Mod: ,,, | Performed by: STUDENT IN AN ORGANIZED HEALTH CARE EDUCATION/TRAINING PROGRAM

## 2024-02-05 NOTE — PROGRESS NOTES
Mohs Surgery Consult Note    Gris Hobbs is a 78 y.o. male who is referred by Dr. Lubin for evaluation of a SCC on the R radial forearm.     Recurrent skin cancer: No    Preoperative Risk Factors:  Current Anticoagulants: Yes Eliquis and ASA  Endocarditis / Rheumatic Fever hx: No  Immunocompromised: No  Prosthetic joint: No  Congenital heart defect: No  Prosthetic heart valve: No  Diabetic: No  Transplant: No  Pacemaker: No  Defibrillator:  No  Prior problem with local anesthesia: No  Tobacco History: No]  Clindamycin Allergy: No  Pregnant: no     Transmissible Diseases:  HIV No  Hepatitis B or C  No      Exam:  Limited skin exam is normal except for a  SCC  located on the R raidal forearm   .    Pathologic Findings:  Accession # [id number]  Diagnosis: SCC    Assessment and Plan:  Treatment Options : The various treatment options for skin cancer removal were reviewed with the patient in detail. These include Mohs surgery with its high cure rate, excisional surgery, destructive treatment, radiation therapy, and various topical therapies.  Given the indications and high cure rate, the patient has agreed to proceed with Mohs.  Risks and Benefits : The rationale for Mohs was explained to the patient. The risks and benefits to therapy were discussed in detail. Specifically, the risks of infection, scarring, bleeding, dehiscence, hematoma, prolonged wound healing, incomplete removal, allergy to anesthesia, nerve injury, inability to clear the tumor, and recurrence were addressed. The treatment site was clearly identified and confirmed by the patient.    Plan:  Mohs Micrographic Surgery      Thomas Lubin MD   Mohs Surgery/Dermatologic Oncology

## 2024-02-05 NOTE — PROGRESS NOTES
Mohs Surgery Operative Note    Patient name: Gris Hobbs  Date: 02/05/2024    Mohs accession #:   Previous dermpath accession #: [id number]  Location: right radial forearm  Preop diagnosis:SCC  Postop diagnosis: Same  Mohs AUC score: 7  Number of stages: 1  Preop size: 1.5 x 1.2 cm  Postop size: 2.0 x 1.6 cm  Depth of defect: fat  Repair type: Linear   Amount of lidocaine used: 8 ml of 1%   Surgeon and Pathologist: HOLLIE Lubin MD     Indications for Mohs Surgery    Removal of the patient's tumor is complicated by the following clinical features: Poorly-defined clinical tumor borders and Patient known to have high-risk tumors (more aggressive than suggested by clinical appearance)    Based on my medical judgement, Mohs surgery is the most appropriate treatment for this cancer compared to other treatments. I discussed alternative treatments to Mohs surgery and specifically discussed the risks and benefits of curettage, excision with permanent sections, and foregoing treatment. The rationale for Mohs was explained to the patient and consent was obtained. The risks, benefits and alternatives to therapy were discussed in detail. Specifically, the risks of infection, scarring, bleeding, prolonged wound healing, incomplete removal, allergy to anesthesia, nerve injury and recurrence were addressed. Prior to the procedure, the treatment site was clearly identified and confirmed by the patient. All components of Universal Protocol/PAUSE Rule completed. NISH Lubin MD operated in two distinct and integrated capacities as the surgeon and pathologist.    STAGE I:  The patient was placed on the operating table. The cancer was identified and outlined. The entire surgical field was prepped with chlorhexidine The surgical site was anesthetized using Lidocaine 1% with epinephrine 1:100,000 buffered with sodium bicarbonate 8.4% in a 1:10 ratio.    The area of clinically apparent tumor was debulked with a 2 mm  curette. The layer of tissue was then surgically excised using a #15 blade and was then transferred onto a specimen sheet maintaining the orientation of the specimen. Hemostasis was obtained using monopolar electrodesiccation. The wound site was then covered with a dressing while the tissue samples were processed for examination.    The specimen was oriented, mapped and divided. Each section was then inked and processed in the Mohs lab using the Mohs protocol and submitted for frozen section. The histopathologic sections were reviewed by the surgeon in conjunction with the reference map.     Total blocks: 1 Total slides: 3    Frozen section analysis revealed: No additional tumor identified on microscopic examination by the surgeon. Histology: No malignant cells seen in the sections examined.    Additional Histologic Findings: actinic keratosis    REPAIR: Intermediate Closure    Primary Surgeon : HOLLIE Lubin MD  Repair Size: 5 cm  Sutures:  3-0 PDS; 5-0 prolene     The defect was identified and a marking pen was used to plan the repair. The area was infiltrated with Lidocaine 1% with epinephrine 1:100,000 buffered with sodium bicarbonate 8.4% in a 1:10 ratio, prepped with chlorhexidine and draped with sterile towels.  The wound was debeveled and undermined widely. Cones were excised within relaxed skin tension lines on both sides of the defect. Hemostasis was obtained using monopolar electrodesiccation. The dermis and subcutaneous tissue were then approximated using buried vertical mattress sutures. Percutaneous simple running sutures were carefully placed for maximum eversion and meticulous wound edge approximation. Careful attention was paid to avoid distorting any nearby free margins.  The wound was cleansed with saline and ointment was applied along the wound surface. A sterile pressure dressing was applied. Wound care instructions were given verbally and in writing. The patient left the operating suite in  stable condition. Patient was informed that additional refinement of the resulting surgical scar may be used as a second stage of this reconstruction.    Thomas Lubin MD   Mohs Surgery/Dermatologic Oncology

## 2024-02-05 NOTE — PATIENT INSTRUCTIONS

## 2024-02-14 ENCOUNTER — PROCEDURE VISIT (OUTPATIENT)
Dept: DERMATOLOGY | Facility: CLINIC | Age: 79
End: 2024-02-14
Payer: MEDICARE

## 2024-02-14 DIAGNOSIS — L91.0 HYPERTROPHIC SCAR: ICD-10-CM

## 2024-02-14 DIAGNOSIS — L57.0 ACTINIC KERATOSES: Primary | ICD-10-CM

## 2024-02-14 PROCEDURE — 96574 DBRDMT PRMLG LES W/PDT: CPT | Mod: ,,, | Performed by: STUDENT IN AN ORGANIZED HEALTH CARE EDUCATION/TRAINING PROGRAM

## 2024-02-14 PROCEDURE — 99499 UNLISTED E&M SERVICE: CPT | Mod: ,,, | Performed by: STUDENT IN AN ORGANIZED HEALTH CARE EDUCATION/TRAINING PROGRAM

## 2024-02-14 NOTE — PROGRESS NOTES
Plan: PDT: Blue    Treatment Number: 1  Location:face  Total Incubation Time: 2:00  Incubation Start Time: 0915  Incubation End Time: 11:15  Illumination Time: 00:16:40  Procedure Performed By: Thomas Lubin MD  Medical Necessity: Precancerous Lesions    Written consent obtained. The risks were reviewed with the patient including but not limited to: pigmentary changes, pain, blistering, scabbing, redness, and the remote possibility of scarring. Prior to application of the photodynamic medication the hyperkeratotic lesions were curetted to make them more amenable to therapy. The treatment areas were cleaned and prepped in the usual fashion. A Levulan Kerastick was applied to the face and incubated for 2:00. The treatment area was then illuminated with a Star-U light source for 00:16:40. I reviewed with the patient in detail post-care instructions. Patient is to avoid sunlight for the next 2 days, and wear sun protection. Patients may expect sunburn like redness, discomfort and scabbing.    Lot Number: [id number]  Expiration Date: 2026-09  NDC Number: 49270-956-05

## 2024-02-24 NOTE — PROGRESS NOTES
Plan: Intralesional Kenalog to thickened scar of L hand from skin graft placement due    Treatment Number: 1  Lesions Injected: 1  Medication Injected: 10 mg/cc of Kenalog  Total Volume Injected: 0.5 cc  Lot Number: [id number]  Expiration Date: 10/2025  NDC #: 2264-8338-06  Administered by: Thomas Lubin MD     The risks of atrophy were reviewed with the patient.

## 2024-03-15 DIAGNOSIS — E53.8 B12 DEFICIENCY: ICD-10-CM

## 2024-03-18 RX ORDER — CYANOCOBALAMIN 1000 UG/ML
INJECTION, SOLUTION INTRAMUSCULAR; SUBCUTANEOUS
Qty: 1 ML | Refills: 4 | Status: SHIPPED | OUTPATIENT
Start: 2024-03-18

## 2024-03-19 ENCOUNTER — OFFICE VISIT (OUTPATIENT)
Dept: FAMILY MEDICINE | Facility: CLINIC | Age: 79
End: 2024-03-19
Payer: MEDICARE

## 2024-03-19 ENCOUNTER — APPOINTMENT (OUTPATIENT)
Dept: RADIOLOGY | Facility: CLINIC | Age: 79
End: 2024-03-19
Attending: FAMILY MEDICINE
Payer: MEDICARE

## 2024-03-19 VITALS
SYSTOLIC BLOOD PRESSURE: 157 MMHG | HEART RATE: 78 BPM | WEIGHT: 185 LBS | DIASTOLIC BLOOD PRESSURE: 79 MMHG | OXYGEN SATURATION: 100 % | BODY MASS INDEX: 26.48 KG/M2 | RESPIRATION RATE: 18 BRPM | HEIGHT: 70 IN

## 2024-03-19 DIAGNOSIS — R05.1 ACUTE COUGH: ICD-10-CM

## 2024-03-19 DIAGNOSIS — I70.0 AORTIC ATHEROSCLEROSIS: ICD-10-CM

## 2024-03-19 DIAGNOSIS — M54.12 CERVICAL RADICULOPATHY: ICD-10-CM

## 2024-03-19 DIAGNOSIS — J22 LOWER RESPIRATORY INFECTION: Primary | ICD-10-CM

## 2024-03-19 PROCEDURE — 99214 OFFICE O/P EST MOD 30 MIN: CPT | Mod: ,,, | Performed by: FAMILY MEDICINE

## 2024-03-19 PROCEDURE — 71046 X-RAY EXAM CHEST 2 VIEWS: CPT | Mod: 26,,, | Performed by: RADIOLOGY

## 2024-03-19 PROCEDURE — 96372 THER/PROPH/DIAG INJ SC/IM: CPT | Mod: ,,, | Performed by: FAMILY MEDICINE

## 2024-03-19 PROCEDURE — 71046 X-RAY EXAM CHEST 2 VIEWS: CPT | Mod: TC,RHCUB | Performed by: FAMILY MEDICINE

## 2024-03-19 RX ORDER — DEXAMETHASONE SODIUM PHOSPHATE 4 MG/ML
8 INJECTION, SOLUTION INTRA-ARTICULAR; INTRALESIONAL; INTRAMUSCULAR; INTRAVENOUS; SOFT TISSUE
Status: COMPLETED | OUTPATIENT
Start: 2024-03-19 | End: 2024-03-19

## 2024-03-19 RX ORDER — LEVOFLOXACIN 500 MG/1
500 TABLET, FILM COATED ORAL DAILY
Qty: 10 TABLET | Refills: 0 | Status: SHIPPED | OUTPATIENT
Start: 2024-03-19 | End: 2024-04-23

## 2024-03-19 RX ADMIN — DEXAMETHASONE SODIUM PHOSPHATE 8 MG: 4 INJECTION, SOLUTION INTRA-ARTICULAR; INTRALESIONAL; INTRAMUSCULAR; INTRAVENOUS; SOFT TISSUE at 02:03

## 2024-03-19 NOTE — PROGRESS NOTES
Yobany Marsh MD        PATIENT NAME: Gris Hobbs  : 1945  DATE: 3/19/24  MRN: [id number]      Billing Provider: Yobany Marsh MD  Level of Service: [place] OFFICE/OUTPT VISIT, EST, LEVL IV, 30-39 MIN  Patient PCP Information       Provider PCP Type    Yobany Marsh MD General            Reason for Visit / Chief Complaint: Sinus Problem (Sinus drainage, sore throat) and Nail Problem (Fingernails are brittle and falling off)       Update PCP  Update Chief Complaint         History of Present Illness / Problem Focused Workflow     Gris Hobbs presents to the clinic with Sinus Problem (Sinus drainage, sore throat) and Nail Problem (Fingernails are brittle and falling off)     URI symptoms for two week.  Crick in neck x mos.    Sinus Problem  Associated symptoms include congestion, coughing and neck pain. Pertinent negatives include no headaches, sinus pressure or sore throat.   Nail Problem  Associated symptoms include congestion, coughing and neck pain. Pertinent negatives include no abdominal pain, arthralgias, chest pain, fever, headaches, nausea, rash, sore throat or weakness (global weakness).     Review of Systems     Review of Systems   Constitutional:  Negative for activity change, appetite change, fever and unexpected weight change.   HENT:  Positive for congestion. Negative for rhinorrhea, sinus pressure, sinus pain, sore throat and trouble swallowing.    Eyes:  Negative for photophobia, pain, discharge and visual disturbance.   Respiratory:  Positive for cough. Negative for chest tightness, wheezing and stridor.    Cardiovascular:  Negative for chest pain, palpitations and leg swelling.   Gastrointestinal:  Negative for abdominal pain, blood in stool, constipation, diarrhea and nausea.   Endocrine: Negative for polydipsia, polyphagia and polyuria.   Genitourinary:  Negative for difficulty urinating, flank pain and hematuria.   Musculoskeletal:  Positive for neck pain. Negative for  arthralgias.   Skin:  Negative for rash.   Allergic/Immunologic: Negative for food allergies.   Neurological:  Negative for dizziness, tremors, seizures, syncope, weakness (global weakness) and headaches.   Psychiatric/Behavioral:  Negative for behavioral problems, confusion, decreased concentration, dysphoric mood and hallucinations. The patient is not nervous/anxious.         Medical / Social / Family History     Past Medical History:   Diagnosis Date    Atrial fibrillation     Coronary artery disease involving native coronary artery of native heart without angina pectoris     Gout     Hyperlipidemia     Hypertension     Ischemic cardiomyopathy     NSTEMI (non-ST elevated myocardial infarction) 10/2019       Past Surgical History:   Procedure Laterality Date    APPENDECTOMY      BASAL CELL CARCINOMA EXCISION      BIOPSY WITH TRANSRECTAL ULTRASOUND (TRUS) GUIDANCE      CARDIAC CATHETERIZATION      LAPAROSCOPIC CHOLECYSTECTOMY N/A 01/04/2023    Procedure: CHOLECYSTECTOMY, LAPAROSCOPIC;  Surgeon: Pablito Feliciano MD;  Location: [facility];  Service: General;  Laterality: N/A;    SQUAMOUS CELL CARCINOMA EXCISION         Social History    reports that he has never smoked. He has never been exposed to tobacco smoke. He has never used smokeless tobacco. He reports that he does not drink alcohol and does not use drugs.    Family History  's family history includes Cancer in his father; Colon cancer in his father; Heart attack in his mother.    Medications and Allergies     Medications  No outpatient medications have been marked as taking for the 3/19/24 encounter (Office Visit) with Yobany Marsh MD.     Current Facility-Administered Medications for the 3/19/24 encounter (Office Visit) with Yobany Marsh MD   Medication Dose Route Frequency Provider Last Rate Last Admin    dexAMETHasone injection 8 mg  8 mg Intramuscular 1 time in Clinic/HOD Yobany Marsh MD           Allergies  Review of patient's  allergies indicates:   Allergen Reactions    Pcn [penicillins]        Physical Examination     Vitals:    03/19/24 1313   BP: (!) 157/79   Pulse: 78   Resp: 18     Physical Exam  Constitutional:       General: He is not in acute distress.     Appearance: Normal appearance.   HENT:      Head: Normocephalic.      Right Ear: Tympanic membrane and ear canal normal.      Left Ear: Tympanic membrane and ear canal normal.      Nose: Nose normal.      Mouth/Throat:      Mouth: Mucous membranes are moist.      Pharynx: No oropharyngeal exudate.   Eyes:      Extraocular Movements: Extraocular movements intact.      Pupils: Pupils are equal, round, and reactive to light.   Cardiovascular:      Rate and Rhythm: Normal rate and regular rhythm.      Heart sounds: No murmur heard.  Pulmonary:      Effort: Pulmonary effort is normal.      Breath sounds: Rales present. No wheezing.      Comments: L base  Abdominal:      General: Abdomen is flat. Bowel sounds are normal.      Palpations: Abdomen is soft.      Hernia: No hernia is present.   Musculoskeletal:         General: Normal range of motion.      Cervical back: Neck supple.      Right lower leg: No edema.      Left lower leg: No edema.   Lymphadenopathy:      Cervical: No cervical adenopathy.   Skin:     General: Skin is warm and dry.      Coloration: Skin is not jaundiced.      Findings: No lesion.   Neurological:      General: No focal deficit present.      Mental Status: He is alert and oriented to person, place, and time.      Cranial Nerves: No cranial nerve deficit.      Gait: Gait normal.   Psychiatric:         Mood and Affect: Mood normal.         Behavior: Behavior normal.         Judgment: Judgment normal.          Assessment and Plan (including Health Maintenance)      Problem List  Smart Sets  Document Outside HM   :    Plan:     1. Aortic atherosclerosis  The current medical regimen is effective;  continue present plan and medications.          Health Maintenance  Due   Topic Date Due    Hepatitis C Screening  Never done    TETANUS VACCINE  Never done    Shingles Vaccine (1 of 2) Never done    RSV Vaccine (Age 60+ and Pregnant patients) (1 - 1-dose 60+ series) Never done    Pneumococcal Vaccines (Age 65+) (2 of 2 - PPSV23 or PCV20) 09/30/2016    Influenza Vaccine (1) 09/01/2023    COVID-19 Vaccine (2 - 2023-24 season) 09/01/2023       1. Lower respiratory infection  -     dexAMETHasone injection 8 mg  -     levoFLOXacin (LEVAQUIN) 500 MG tablet; Take 1 tablet (500 mg total) by mouth once daily.  Dispense: 10 tablet; Refill: 0    2. Aortic atherosclerosis    3. Cervical radiculopathy  -     Ambulatory referral/consult to Physical/Occupational Therapy; Future; Expected date: 03/26/2024    4. Acute cough  -     X-Ray Chest PA And Lateral; Future; Expected date: 03/19/2024         Health Maintenance Topics with due status: Not Due       Topic Last Completion Date    Lipid Panel 08/10/2023       Future Appointments   Date Time Provider Department Center   4/19/2024 10:00 AM Harleen Lubin MD [facility] DER[facility]   7/8/2024 12:30 PM Anabell Delarosa, [facility] CARD [facility]        There are no Patient Instructions on file for this visit.  Follow up if symptoms worsen or fail to improve.     Signature:  Yobany Marsh MD      Date of encounter: 3/19/24

## 2024-03-28 ENCOUNTER — CLINICAL SUPPORT (OUTPATIENT)
Dept: REHABILITATION | Facility: HOSPITAL | Age: 79
End: 2024-03-28
Payer: MEDICARE

## 2024-03-28 DIAGNOSIS — M54.12 CERVICAL RADICULOPATHY: ICD-10-CM

## 2024-03-28 PROCEDURE — 97161 PT EVAL LOW COMPLEX 20 MIN: CPT | Mod: PN

## 2024-03-28 PROCEDURE — 97110 THERAPEUTIC EXERCISES: CPT | Mod: PN

## 2024-03-28 NOTE — PLAN OF CARE
[facility]  Physical Therapy Initial Evaluation    Name: Gris Hobbs  Clinic Number: [id number]    Therapy Diagnosis:   Encounter Diagnosis   Name Primary?    Cervical radiculopathy      Physician: Yobany Marsh MD    Physician Orders: PT Eval and Treat    Medical Diagnosis from Referral: cervical radiculopathy   Evaluation Date: 3/28/2024  Authorization Period Expiration: 4/28/2024  Plan of Care Expiration: 4/28/2024  Visit # / Visits authorized: 1/ 20    Time In: 845  Time Out: 930  Total Appointment Time (timed & untimed codes): 45 minutes    Precautions: Standard    Subjective   Date of onset: pt states he has been having neck pain that would come and go. Pt states lately he has had increase frequency of like a crick in his neck in the mornings that lasts a good while.  Pt states he has mostly pain on his left side. Pt states he has some stiffness with his shoulders and weakness. Pt states he has not had headaches.   Pt voices he noticed his left rotation has decreased which makes him have to rotate his body.    History of current condition - Imtiaz reports: hx below      Medical History:   Past Medical History:   Diagnosis Date    Atrial fibrillation     Coronary artery disease involving native coronary artery of native heart without angina pectoris     Gout     Hyperlipidemia     Hypertension     Ischemic cardiomyopathy     NSTEMI (non-ST elevated myocardial infarction) 10/2019       Surgical History:   Gris Hobbs  has a past surgical history that includes Appendectomy; Biopsy with transrectal ultrasound (TRUS) guidance; Cardiac catheterization; Laparoscopic cholecystectomy (N/A, 01/04/2023); Excision basal cell carcinoma; and Squamous cell carcinoma excision.    Medications:   Gris has a current medication list which includes the following prescription(s): allopurinol, amiodarone, apixaban, aspirin, cholecalciferol (vitamin d3), cyanocobalamin, diltiazem, ezetimibe,  hydrocodone-acetaminophen, hydrocodone-acetaminophen, levofloxacin, metoprolol succinate, multivitamin, mupirocin, pantoprazole, and rosuvastatin.    Allergies:   Review of patient's allergies indicates:   Allergen Reactions    Pcn [penicillins]         Imaging, none:       Prior Therapy: none recent   Social History:   lives with their spouse  Occupation: retired   Prior Level of Function: independent   Current Level of Function: neck pain     Pain:  Current 0/10, worst 5/10, best 0/10   Location: left cervical tighter than right    Description: Aching, Dull, Grabbing, Tight, and Deep  Aggravating Factors: Sitting, Morning, Extension, and Lifting  Easing Factors: nothing and rest    Pts goals: pt wants to no longer have stiffness in the morning and be able to rotate to drive pain pain free     Objective     Posture:   Forward head: increased  Thoracic curve: increased  Cervical curve: increased  Laterally flexed: left  Rotated: left  Rounded shoulders: yes  Scoliosis: no  Shoulder height: right decreased  Clavical height: right decreased  Comments:    Cervical AROM:     SBL:35  SBR:10  RL:  28  RR:  35    MMT Right  Left    C1-C2 Chin up MMT strength: 3+/5 MMT strength: 3+/5   C1-C2 Chin in MMT shoulder: 3+/5 MMT strength: 3+/5   C3 Lateral flexion MMT strength: 3+/5 MMT strength: 3+/5   C4 Shoulder Shrug MMT strength: 3+/5 MMT strength: 4/5   C5 Biceps MMT strength: 4/5 MMT strength: 4/5   C6 Wrist extension MMT strength: 4/5 MMT strength: 4/5   C7 Triceps MMT strength: 4/5 MMT strength: 4/5          Shoulder AROM Right  Left    Flexion (180) 132 140   Internal rotation (45) 45 45   External rotation (45)      Abduction (180) 105 130                     Segment/Mobility:     Special test:    Compression test Negative   Thoracic outlet  Negative   Distraction Negative                 Palpation Body side Positive/negative Increased tone   Sternocleidomastoid bilateral Positive Yes   Scalenes bilateral Positive Yes    1st rib bilateral Positive Yes   Upper traps bilateral Positive Yes   Suboccipitals bilateral Positive Yes   Transverse process bilateral Positive Yes   Spinous process bilateral Positive Yes   Pec Minor bilateral Positive Yes   Masseter bilateral Negative Yes   Mastoid process bilateral Negative Yes                     Intake outcome measure for FOTO cervical Survey    Therapist reviewed FOTO scores for Gris Hobbs on 3/28/2024.   FOTO documents entered into [facility] - see Media section.    Intake Score: 56%         TREATMENT        Imtiaz received the treatments listed below:  THERAPEUTIC EXERCISES to develop strength, endurance, ROM, flexibility, and posture for 20  minutes including home ex program     Home Exercises and Patient Education Provided    Education provided:   - home ex program     Written Home Exercises Provided: Patient instructed to cont prior HEP.  Exercises were reviewed and Imtiaz was able to demonstrate them prior to the end of the session.  Imtiaz demonstrated fair  understanding of the education provided.     See EMR under Patient Instructions for exercises provided 3/28/2024.    Assessment   Gris is a 78 y.o. male referred to outpatient Physical Therapy with a medical diagnosis of cervical radiculopathy . Pt presents with decreased cervical range of motion and strength . Pt has limited cervical rotation and lateral tilt. Pt has weak scapular retraction and tight pectoralis affecting shoulder range of motion .     Pt prognosis is Excellent.   Pt will benefit from skilled outpatient Physical Therapy to address the deficits stated above and in the chart below, provide pt/family education, and to maximize pt's level of independence.     Plan of care discussed with patient: Yes  Pt's spiritual, cultural and educational needs considered and patient is agreeable to the plan of care and goals as stated below:     Anticipated Barriers for therapy: medical diagnosis of cervical radiculopathy . Pt  presents with decreased cervical range of motion and strength . Pt has limited cervical rotation and lateral tilt. Pt has weak scapular retraction and tight pectoralis affecting shoulder range of motion .       Goals:  Short Term Goals: 4 weeks   Pt will be independent with home ex program   Pt will be able to increase cervical rotation to 55 degrees   Pt  will be able to increase lateral tilt to 30 degrees bilaterally   Decrease pain at worse to  2/10     Long Term Goals: 6  weeks   Pt will be able to drive , able to rotate head at intersections pain free  Pt will be pain free with range of motion   Increase shoulder range of motion flexion and abd  to 140 degrees bilateral     Plan   Plan of care Certification: 3/28/2024 to 4/28/2024.    Outpatient Physical Therapy 2 times weekly for 4 weeks to include the following interventions: Neuromuscular Re-ed, Patient Education, Therapeutic Activities, Therapeutic Exercise, and Ultrasound. (Pt has a watchman heart monitor)    Plan of care has been reestablished with Chelle Shipley LPTA, Janay García LPTA, Nancy Rivers LPTA  and Chelsey Nicholson LPTA.       Tristan Navarro, PT

## 2024-04-01 ENCOUNTER — OFFICE VISIT (OUTPATIENT)
Dept: FAMILY MEDICINE | Facility: CLINIC | Age: 79
End: 2024-04-01
Payer: MEDICARE

## 2024-04-01 VITALS
WEIGHT: 187 LBS | SYSTOLIC BLOOD PRESSURE: 131 MMHG | BODY MASS INDEX: 26.77 KG/M2 | DIASTOLIC BLOOD PRESSURE: 72 MMHG | TEMPERATURE: 98 F | RESPIRATION RATE: 18 BRPM | OXYGEN SATURATION: 99 % | HEIGHT: 70 IN | HEART RATE: 64 BPM

## 2024-04-01 DIAGNOSIS — J22 LOWER RESPIRATORY INFECTION: Primary | ICD-10-CM

## 2024-04-01 PROCEDURE — 99213 OFFICE O/P EST LOW 20 MIN: CPT | Mod: ,,, | Performed by: FAMILY MEDICINE

## 2024-04-01 RX ORDER — AZITHROMYCIN 250 MG/1
TABLET, FILM COATED ORAL
Qty: 6 TABLET | Refills: 0 | Status: SHIPPED | OUTPATIENT
Start: 2024-04-01 | End: 2024-04-23

## 2024-04-01 RX ORDER — METHYLPREDNISOLONE 4 MG/1
TABLET ORAL
Qty: 21 EACH | Refills: 0 | Status: SHIPPED | OUTPATIENT
Start: 2024-04-01 | End: 2024-04-23

## 2024-04-01 RX ORDER — ALBUTEROL SULFATE 90 UG/1
2 AEROSOL, METERED RESPIRATORY (INHALATION) EVERY 6 HOURS PRN
Qty: 18 G | Refills: 2 | Status: SHIPPED | OUTPATIENT
Start: 2024-04-01

## 2024-04-01 NOTE — PROGRESS NOTES
Yobany Marsh MD        PATIENT NAME: Gris Hobbs  : 1945  DATE: 24  MRN: [id number]      Billing Provider: Yobany Marsh MD  Level of Service: IN OFFICE/OUTPT VISIT, EST, LEVL III, 20-29 MIN  Patient PCP Information       Provider PCP Type    Yobany Marsh MD General            Reason for Visit / Chief Complaint: Cough (Cough wont go away, keeping him up at night)       Update PCP  Update Chief Complaint         History of Present Illness / Problem Focused Workflow     Gris Hobbs presents to the clinic with Cough (Cough wont go away, keeping him up at night)     Some better but still has congestion and cough.      Cough  Pertinent negatives include no chest pain, fever, headaches, rash, rhinorrhea, sore throat or wheezing.       Review of Systems     Review of Systems   Constitutional:  Negative for activity change, appetite change, fever and unexpected weight change.   HENT:  Positive for congestion. Negative for rhinorrhea, sinus pressure, sinus pain, sore throat and trouble swallowing.    Eyes:  Negative for photophobia, pain, discharge and visual disturbance.   Respiratory:  Positive for cough. Negative for chest tightness, wheezing and stridor.    Cardiovascular:  Negative for chest pain, palpitations and leg swelling.   Gastrointestinal:  Negative for abdominal pain, blood in stool, constipation, diarrhea and nausea.   Endocrine: Negative for polydipsia, polyphagia and polyuria.   Genitourinary:  Negative for difficulty urinating, flank pain and hematuria.   Musculoskeletal:  Negative for arthralgias and neck pain.   Skin:  Negative for rash.   Allergic/Immunologic: Negative for food allergies.   Neurological:  Negative for dizziness, tremors, seizures, syncope, weakness (global weakness) and headaches.   Psychiatric/Behavioral:  Negative for behavioral problems, confusion, decreased concentration, dysphoric mood and hallucinations. The patient is not nervous/anxious.          Medical / Social / Family History     Past Medical History:   Diagnosis Date    Atrial fibrillation     Coronary artery disease involving native coronary artery of native heart without angina pectoris     Gout     Hyperlipidemia     Hypertension     Ischemic cardiomyopathy     NSTEMI (non-ST elevated myocardial infarction) 10/2019       Past Surgical History:   Procedure Laterality Date    APPENDECTOMY      BASAL CELL CARCINOMA EXCISION      BIOPSY WITH TRANSRECTAL ULTRASOUND (TRUS) GUIDANCE      CARDIAC CATHETERIZATION      LAPAROSCOPIC CHOLECYSTECTOMY N/A 01/04/2023    Procedure: CHOLECYSTECTOMY, LAPAROSCOPIC;  Surgeon: Pablito Feliciano MD;  Location: [facility];  Service: General;  Laterality: N/A;    SQUAMOUS CELL CARCINOMA EXCISION         Social History    reports that he has never smoked. He has never been exposed to tobacco smoke. He has never used smokeless tobacco. He reports that he does not drink alcohol and does not use drugs.    Family History  's family history includes Cancer in his father; Colon cancer in his father; Heart attack in his mother.    Medications and Allergies     Medications  No outpatient medications have been marked as taking for the 4/1/24 encounter (Office Visit) with Yobany Marsh MD.       Allergies  Review of patient's allergies indicates:   Allergen Reactions    Pcn [penicillins]        Physical Examination     Vitals:    04/01/24 1331   BP: 131/72   Pulse: 64   Resp: 18   Temp: 98 °F (36.7 °C)     Physical Exam  Constitutional:       General: He is not in acute distress.     Appearance: Normal appearance.   HENT:      Head: Normocephalic.      Right Ear: Tympanic membrane and ear canal normal.      Left Ear: Tympanic membrane and ear canal normal.      Nose: Nose normal.      Mouth/Throat:      Mouth: Mucous membranes are moist.      Pharynx: No oropharyngeal exudate.   Eyes:      Extraocular Movements: Extraocular movements intact.      Pupils: Pupils are equal,  round, and reactive to light.   Cardiovascular:      Rate and Rhythm: Normal rate and regular rhythm.      Heart sounds: No murmur heard.  Pulmonary:      Effort: Pulmonary effort is normal.      Breath sounds: Rhonchi present. No wheezing.   Abdominal:      General: Abdomen is flat. Bowel sounds are normal.      Palpations: Abdomen is soft.      Hernia: No hernia is present.   Musculoskeletal:         General: Normal range of motion.      Cervical back: Normal range of motion and neck supple.      Right lower leg: No edema.      Left lower leg: No edema.   Lymphadenopathy:      Cervical: No cervical adenopathy.   Skin:     General: Skin is warm and dry.      Coloration: Skin is not jaundiced.      Findings: No lesion.   Neurological:      General: No focal deficit present.      Mental Status: He is alert and oriented to person, place, and time.      Cranial Nerves: No cranial nerve deficit.      Gait: Gait normal.   Psychiatric:         Mood and Affect: Mood normal.         Behavior: Behavior normal.         Judgment: Judgment normal.          Assessment and Plan (including Health Maintenance)      Problem List  Smart Sets  Document Outside HM   :    Plan:     1. Lower respiratory infection    -     methylPREDNISolone (MEDROL DOSEPACK) 4 mg tablet; use as directed  Dispense: 21 each; Refill: 0  -     azithromycin (Z-AMADA) 250 MG tablet; Take two tablets now then 1 tab daily x 4 days  Dispense: 6 tablet; Refill: 0  -     albuterol (VENTOLIN HFA) 90 mcg/actuation inhaler; Inhale 2 puffs into the lungs every 6 (six) hours as needed for Wheezing. Rescue  Dispense: 18 g; Refill: 2          Health Maintenance Due   Topic Date Due    Hepatitis C Screening  Never done    TETANUS VACCINE  Never done    Shingles Vaccine (1 of 2) Never done    RSV Vaccine (Age 60+ and Pregnant patients) (1 - 1-dose 60+ series) Never done    Pneumococcal Vaccines (Age 65+) (2 of 2 - PPSV23 or PCV20) 09/30/2016    COVID-19 Vaccine (2 - Moderna  risk series) 03/29/2021    Influenza Vaccine (1) 09/01/2023       1. Lower respiratory infection  -     methylPREDNISolone (MEDROL DOSEPACK) 4 mg tablet; use as directed  Dispense: 21 each; Refill: 0  -     azithromycin (Z-AMADA) 250 MG tablet; Take two tablets now then 1 tab daily x 4 days  Dispense: 6 tablet; Refill: 0  -     albuterol (VENTOLIN HFA) 90 mcg/actuation inhaler; Inhale 2 puffs into the lungs every 6 (six) hours as needed for Wheezing. Rescue  Dispense: 18 g; Refill: 2         Health Maintenance Topics with due status: Not Due       Topic Last Completion Date    Lipid Panel 03/25/2024       Future Appointments   Date Time Provider Department Center   4/1/2024  2:20 PM Yobany Marsh MD [facility]   4/2/2024  8:45 AM [facility]   4/5/2024  8:45 AM [facility]   4/19/2024 10:00 AM Harleen Lubin MD [facility]   7/8/2024 12:30 PM Anabell Delarosa ACNP OB CARD [facility]        There are no Patient Instructions on file for this visit.  Follow up if symptoms worsen or fail to improve.     Signature:  Yobany Marsh MD      Date of encounter: 4/1/24

## 2024-04-15 ENCOUNTER — CLINICAL SUPPORT (OUTPATIENT)
Dept: REHABILITATION | Facility: HOSPITAL | Age: 79
End: 2024-04-15
Payer: MEDICARE

## 2024-04-15 DIAGNOSIS — R29.898 DECREASED RANGE OF MOTION OF NECK: ICD-10-CM

## 2024-04-15 DIAGNOSIS — M54.12 CERVICAL RADICULOPATHY: Primary | ICD-10-CM

## 2024-04-15 PROCEDURE — 97530 THERAPEUTIC ACTIVITIES: CPT | Mod: PN,CQ

## 2024-04-15 PROCEDURE — 97112 NEUROMUSCULAR REEDUCATION: CPT | Mod: PN,CQ

## 2024-04-15 PROCEDURE — 97035 APP MDLTY 1+ULTRASOUND EA 15: CPT | Mod: PN,CQ

## 2024-04-18 ENCOUNTER — CLINICAL SUPPORT (OUTPATIENT)
Dept: REHABILITATION | Facility: HOSPITAL | Age: 79
End: 2024-04-18
Payer: MEDICARE

## 2024-04-18 DIAGNOSIS — R29.898 DECREASED RANGE OF MOTION OF NECK: Primary | ICD-10-CM

## 2024-04-18 DIAGNOSIS — M54.12 CERVICAL RADICULOPATHY: ICD-10-CM

## 2024-04-18 PROCEDURE — 97530 THERAPEUTIC ACTIVITIES: CPT | Mod: PN,CQ

## 2024-04-18 PROCEDURE — 97035 APP MDLTY 1+ULTRASOUND EA 15: CPT | Mod: PN,CQ

## 2024-04-18 PROCEDURE — 97112 NEUROMUSCULAR REEDUCATION: CPT | Mod: PN,CQ

## 2024-04-18 NOTE — PROGRESS NOTES
Physical Therapy Treatment Note     Name: Gris Hobbs  Clinic Number: [id number]    Therapy Diagnosis:   Encounter Diagnoses   Name Primary?    Decreased range of motion of neck Yes    Cervical radiculopathy      Physician: Yobany Marsh MD    Visit Date: 4/18/2024  Physician Orders: PT Eval and Treat    Medical Diagnosis from Referral: cervical radiculopathy   Evaluation Date: 3/28/2024  Authorization Period Expiration: 4/28/2024  Plan of Care Expiration: 4/28/2024  Visit # / Visits authorized: 3/ 20  PTA Visit #: 2    Time In: 845  Time Out: 923  Total Billable Time: 38 minutes    Precautions: (Pt has a watchman heart monitor)    Subjective     Pt reports: I'm doing ok this am, my cough is a little better  He was compliant with home exercise program.  Response to previous treatment:   Functional change: ongoing    Pain: 3/10  Location: bilateral neck      Objective     Range of motion  Tilt             right  33    left   16   Rotation    right  38    left   38    Imtiaz participated in neuromuscular re-education activities to improve: Kinesthetic, Sense, and Posture for 15 minutes. The following activities were included:  Upper body ergometer x 5'  Scapular retraction blue x 10  Doorway pectoralis stretch x 10    Imtiaz participated in dynamic functional therapeutic activities to improve functional performance for 15  minutes, including:  Bilateral cervical  rotation and tilt  x 10 each to be able to look mutidirectional  while driving  Cervical extension  in sitting x 10 to look up  Pulleys x 5 to simulate reaching into cabinets  Supine with towel roll under neck to promote neck extension to look up x 2'    Imtiaz received the following direct contact modalities after being cleared for contraindications: Ultrasound:  Gris received ultrasound to manage pain and inflammation at 100 % duty cycle applied to the left upper/middle traps  at an intensity of  1.5W/cm2/ 3 mhz for a duration of 8 minutes.  Patient tolerated treatment well without adverse effects. Therapist was in attendance throughout intervention.      Home Exercises Provided and Patient Education Provided     Education provided: home exercise program     Written Home Exercises Provided: Patient instructed to cont prior HEP.  Exercises were reviewed and Imtiaz was able to demonstrate them prior to the end of the session.  Imtiaz demonstrated good  understanding of the education provided.     See EMR under Patient Instructions for exercises provided prior visit.    Assessment     Patient progressing with range of motion in all  planes, patient instructed to include supine head extension exercises with towel roll with home exercise program.  Imtiaz Is progressing well towards his goals.   Pt prognosis is excellent    Pt will continue to benefit from skilled outpatient physical therapy to address the deficits listed in the problem list box on initial evaluation, provide pt/family education and to maximize pt's level of independence in the home and community environment.     Pt's spiritual, cultural and educational needs considered and pt agreeable to plan of care and goals.     Anticipated barriers to physical therapy: compliance with home exercise program     Goals:  Short Term Goals: 4 weeks   Pt will be independent with home ex program   Pt will be able to increase cervical rotation to 55 degrees   Pt  will be able to increase lateral tilt to 30 degrees bilaterally   Decrease pain at worse to  2/10      Long Term Goals: 6  weeks   Pt will be able to drive , able to rotate head at intersections pain free  Pt will be pain free with range of motion   Increase shoulder range of motion flexion and abd  to 140 degrees bilateral   Plan       Plan of care Certification: 3/28/2024 to 4/28/2024.     Outpatient Physical Therapy 2 times weekly for 4 weeks to include the following interventions: Neuromuscular Re-ed, Patient Education, Therapeutic Activities,  Therapeutic Exercise, and Ultrasound. (Pt has a watchman heart monitor) Plan of care reviewed with Tristan Navarro, PT.       Roopa Shipley, PTA  4/18/2024

## 2024-04-22 ENCOUNTER — CLINICAL SUPPORT (OUTPATIENT)
Dept: REHABILITATION | Facility: HOSPITAL | Age: 79
End: 2024-04-22
Payer: MEDICARE

## 2024-04-22 DIAGNOSIS — R29.898 DECREASED RANGE OF MOTION OF NECK: Primary | ICD-10-CM

## 2024-04-22 PROCEDURE — 97035 APP MDLTY 1+ULTRASOUND EA 15: CPT | Mod: PN

## 2024-04-22 PROCEDURE — 97530 THERAPEUTIC ACTIVITIES: CPT | Mod: PN

## 2024-04-22 PROCEDURE — 97112 NEUROMUSCULAR REEDUCATION: CPT | Mod: PN

## 2024-04-22 NOTE — PROGRESS NOTES
Physical Therapy Treatment Note     Name: Gris Hobbs  Clinic Number: [id number]    Therapy Diagnosis:   No diagnosis found.    Physician: Yobany Marsh MD    Visit Date: 4/22/2024  Physician Orders: PT Eval and Treat    Medical Diagnosis from Referral: cervical radiculopathy   Evaluation Date: 3/28/2024  Authorization Period Expiration: 4/28/2024  Plan of Care Expiration: 4/28/2024  Visit # / Visits authorized: 4/ 20  PTA Visit #:     Time In: 1230  Time Out: 1308  Total Billable Time: 38 minutes    Precautions: (Pt has a watchman heart monitor)    Subjective     Pt reports: I'm doing ok this am, my cough is a little better  He was compliant with home exercise program.  Response to previous treatment:   Functional change: ongoing    Pain: 3/10  Location: bilateral neck      Objective     Range of motion  Tilt             right  33    left   16   Rotation    right  50     left   55    Imtiaz participated in neuromuscular re-education activities to improve: Kinesthetic, Sense, and Posture for 15 minutes. The following activities were included:  Upper body ergometer x 5'  Scapular retraction blue x 10  Doorway pectoralis stretch x 10    Imtiaz participated in dynamic functional therapeutic activities to improve functional performance for 15  minutes, including:  Bilateral cervical  rotation and tilt  x 10 each to be able to look mutidirectional  while driving  Cervical extension  in sitting x 10 to look up  Pulleys x 5 to simulate reaching into cabinets  Supine with towel roll under neck to promote neck extension to look up x 2'    Imtiaz received the following direct contact modalities after being cleared for contraindications: Ultrasound:  Gris received ultrasound to manage pain and inflammation at 100 % duty cycle applied to the left upper/middle traps  at an intensity of  1.5W/cm2/ 3 mhz for a duration of 8 minutes. Patient tolerated treatment well without adverse effects. Therapist was in attendance  throughout intervention.      Home Exercises Provided and Patient Education Provided     Education provided: home exercise program     Written Home Exercises Provided: Patient instructed to cont prior HEP.  Exercises were reviewed and Imtiaz was able to demonstrate them prior to the end of the session.  Imtiaz demonstrated good  understanding of the education provided.     See EMR under Patient Instructions for exercises provided prior visit.    Assessment     Patient progressing with range of motion in all  planes, patient instructed to include supine head extension exercises with towel roll with home exercise program.  Imtiaz Is progressing well towards his goals.   Pt prognosis is excellent    Pt will continue to benefit from skilled outpatient physical therapy to address the deficits listed in the problem list box on initial evaluation, provide pt/family education and to maximize pt's level of independence in the home and community environment.     Pt's spiritual, cultural and educational needs considered and pt agreeable to plan of care and goals.     Anticipated barriers to physical therapy: compliance with home exercise program     Goals:  Short Term Goals: 4 weeks   Pt will be independent with home ex program   Pt will be able to increase cervical rotation to 55 degrees   Pt  will be able to increase lateral tilt to 30 degrees bilaterally   Decrease pain at worse to  2/10      Long Term Goals: 6  weeks   Pt will be able to drive , able to rotate head at intersections pain free  Pt will be pain free with range of motion   Increase shoulder range of motion flexion and abd  to 140 degrees bilateral   Plan       Plan of care Certification: 3/28/2024 to 4/28/2024.     Outpatient Physical Therapy 2 times weekly for 4 weeks to include the following interventions: Neuromuscular Re-ed, Patient Education, Therapeutic Activities, Therapeutic Exercise, and Ultrasound. (Pt has a watchman heart monitor)      Plan of care has  been reestablished with Chelle Shipley LPTA, Janay García LPTA, Nancy Rivers LPTA  and Chelsey Nicholson LPTA.       Tristan Navarro, PT  4/22/2024

## 2024-04-23 ENCOUNTER — OFFICE VISIT (OUTPATIENT)
Dept: FAMILY MEDICINE | Facility: CLINIC | Age: 79
End: 2024-04-23
Payer: MEDICARE

## 2024-04-23 VITALS
RESPIRATION RATE: 18 BRPM | SYSTOLIC BLOOD PRESSURE: 145 MMHG | HEIGHT: 70 IN | HEART RATE: 59 BPM | DIASTOLIC BLOOD PRESSURE: 72 MMHG | WEIGHT: 176.88 LBS | BODY MASS INDEX: 25.32 KG/M2 | OXYGEN SATURATION: 97 %

## 2024-04-23 DIAGNOSIS — J22 LOWER RESPIRATORY INFECTION: Primary | ICD-10-CM

## 2024-04-23 PROCEDURE — 96372 THER/PROPH/DIAG INJ SC/IM: CPT | Mod: ,,, | Performed by: NURSE PRACTITIONER

## 2024-04-23 PROCEDURE — 99213 OFFICE O/P EST LOW 20 MIN: CPT | Mod: ,,, | Performed by: NURSE PRACTITIONER

## 2024-04-23 RX ORDER — LEVOFLOXACIN 500 MG/1
500 TABLET, FILM COATED ORAL DAILY
Qty: 10 TABLET | Refills: 0 | Status: SHIPPED | OUTPATIENT
Start: 2024-04-23 | End: 2024-05-03

## 2024-04-23 RX ORDER — DEXAMETHASONE SODIUM PHOSPHATE 4 MG/ML
4 INJECTION, SOLUTION INTRA-ARTICULAR; INTRALESIONAL; INTRAMUSCULAR; INTRAVENOUS; SOFT TISSUE
Status: COMPLETED | OUTPATIENT
Start: 2024-04-23 | End: 2024-04-23

## 2024-04-23 RX ADMIN — DEXAMETHASONE SODIUM PHOSPHATE 4 MG: 4 INJECTION, SOLUTION INTRA-ARTICULAR; INTRALESIONAL; INTRAMUSCULAR; INTRAVENOUS; SOFT TISSUE at 03:04

## 2024-04-23 NOTE — PROGRESS NOTES
Subjective:       Patient ID: Gris Hobbs is a 78 y.o. male.    Chief Complaint: Cough (Pt stated he has came in 2x and he just can't seem to get over the cough been going on X4 weeks )      Active Problem List with Overview Notes    Diagnosis Date Noted    Lower respiratory infection 04/23/2024    Decreased range of motion of neck 04/15/2024    Cervical radiculopathy 03/28/2024    Aortic atherosclerosis 03/19/2024    Paroxysmal atrial fibrillation 07/05/2023     [id number]-NX0-[id number] score is 3    Patient is currently on ASA/Plavix for h/o CAD and CVA prophylaxis. We had a long discussion regarding the risk of CVA with atrial fibrillation as well as the options for EP study/ablation. The patient has no h/o hemorrhage or falls.If this situation changes we could refer to evaluation for the Watchman procedure.    We will start Eliquis 5 mg po BID for CVA prophylaxis; stop plavix and continue ASA 81 mg po daily.  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 He will d/w them his options and notify my office should he wish to go in a different direction.       Delayed gastric emptying 03/21/2023    Vitamin B 12 deficiency 03/21/2023    Biliary dyskinesia 12/16/2022    Atrophic gastritis without hemorrhage 10/23/2022    Constipation 10/18/2022    Esophageal dysphagia 10/07/2022    Gastroesophageal reflux disease 09/15/2022    Gastritis 08/24/2022    Bloating 08/22/2022    Ischemic cardiomyopathy 06/23/2022     EF 60%      Hyperlipidemia 06/23/2022    Hypertension, essential 06/23/2022    Coronary artery disease involving native coronary artery of native heart without angina pectoris 06/23/2022     s/p NSTEMI 10/19          Review of Systems   Constitutional:  Negative for chills, fatigue and fever.   HENT:  Positive for congestion. Negative for hearing loss, postnasal drip, rhinorrhea, sore throat, tinnitus and voice change.    Respiratory:  Positive for cough. Negative for apnea, choking, chest tightness and shortness of  breath.    Cardiovascular:  Negative for chest pain, palpitations and leg swelling.   Gastrointestinal:  Negative for abdominal pain, constipation, diarrhea and nausea.   Genitourinary:  Negative for difficulty urinating.   Neurological:  Negative for dizziness, syncope, weakness and headaches.   Psychiatric/Behavioral:  Negative for sleep disturbance.             Objective:      Vitals:    04/23/24 1455   BP: (!) 145/72   Pulse:    Resp:       Physical Exam  Vitals reviewed.   Constitutional:       Appearance: Normal appearance.   HENT:      Head: Normocephalic.      Right Ear: Tympanic membrane, ear canal and external ear normal.      Left Ear: Tympanic membrane, ear canal and external ear normal.      Nose: Nose normal.      Mouth/Throat:      Mouth: Mucous membranes are moist.      Pharynx: Oropharynx is clear.   Eyes:      Pupils: Pupils are equal, round, and reactive to light.   Cardiovascular:      Rate and Rhythm: Normal rate and regular rhythm.      Pulses: Normal pulses.      Heart sounds: Normal heart sounds.   Pulmonary:      Effort: Pulmonary effort is normal.      Breath sounds: Wheezing and rhonchi present.   Abdominal:      General: Bowel sounds are normal.      Palpations: Abdomen is soft.   Musculoskeletal:         General: Normal range of motion.      Cervical back: Normal range of motion.   Skin:     General: Skin is warm and dry.   Neurological:      Mental Status: He is alert and oriented to person, place, and time.   Psychiatric:         Mood and Affect: Mood normal.         Behavior: Behavior normal.       Assessment:       1. Lower respiratory infection        Plan:   Levaquin/IM Steroid  Use inhaler at HS  Increase water intake.   Follow up in 5-7 days with Dr. Marsh.   Problem List Items Addressed This Visit          Pulmonary    Lower respiratory infection - Primary    Relevant Medications    dexAMETHasone injection 4 mg (Start on 4/23/2024  3:30 PM)    levoFLOXacin (LEVAQUIN) 500 MG  tablet       Health Maintenance:  Health Maintenance Topics with due status: Not Due       Topic Last Completion Date    Lipid Panel 03/25/2024           Laura Hardy [facility]   4/23/24

## 2024-04-25 ENCOUNTER — CLINICAL SUPPORT (OUTPATIENT)
Dept: REHABILITATION | Facility: HOSPITAL | Age: 79
End: 2024-04-25
Payer: MEDICARE

## 2024-04-25 DIAGNOSIS — R29.898 DECREASED RANGE OF MOTION OF NECK: Primary | ICD-10-CM

## 2024-04-25 PROCEDURE — 97035 APP MDLTY 1+ULTRASOUND EA 15: CPT | Mod: PN

## 2024-04-25 PROCEDURE — 97112 NEUROMUSCULAR REEDUCATION: CPT | Mod: PN

## 2024-04-25 PROCEDURE — 97530 THERAPEUTIC ACTIVITIES: CPT | Mod: PN

## 2024-04-25 NOTE — PLAN OF CARE
Physical Therapy Treatment Note     Name: Gris Hobbs  Clinic Number: [id number]    Therapy Diagnosis:   Encounter Diagnosis   Name Primary?    Decreased range of motion of neck Yes       Physician: Yobany Marsh MD    Visit Date: 4/25/2024  Physician Orders: PT Eval and Treat    Medical Diagnosis from Referral: cervical radiculopathy   Evaluation Date: 3/28/2024  Authorization Period Expiration: 4/28/2024  Plan of Care Expiration: 4/28/2024  Visit # / Visits authorized: 5/ 20  PTA Visit #:   ..........  Time In: 1230  Time Out: 1308  Total Billable Time: 38 minutes    Precautions: (Pt has a watchman heart monitor)    Subjective     Pt reports  He was compliant with home exercise program.  Response to previous treatment:   Functional change: ongoing    Pain: 3/10  Location: bilateral neck      Objective     Range of motion  Tilt             right  33    left   16   Rotation    right  50     left   55    Imtiaz participated in neuromuscular re-education activities to improve: Kinesthetic, Sense, and Posture for 15 minutes. The following activities were included:  Upper body ergometer x 5'  Scapular retraction blue x 10  Doorway pectoralis stretch x 10  Iliopsoas stretch to get prone 5 x each side     Imtiaz participated in dynamic functional therapeutic activities to improve functional performance for 15  minutes, including:  Bilateral cervical  rotation and tilt  x 10 each to be able to look mutidirectional  while driving  Cervical extension  in sitting x 10 to look up  Pulleys x 5 to simulate reaching into cabinets  Supine with towel roll under neck to promote neck extension to look up x 2'    Imtiaz received the following direct contact modalities after being cleared for contraindications: Ultrasound:  Gris received ultrasound to manage pain and inflammation at 100 % duty cycle applied to the left upper/middle traps  at an intensity of  1.5W/cm2/ 3 mhz for a duration of 8 minutes. Patient tolerated  treatment well without adverse effects. Therapist was in attendance throughout intervention.      Home Exercises Provided and Patient Education Provided     Education provided: home exercise program     Written Home Exercises Provided: Patient instructed to cont prior HEP.  Exercises were reviewed and Imtiaz was able to demonstrate them prior to the end of the session.  Imtiaz demonstrated good  understanding of the education provided.     See EMR under Patient Instructions for exercises provided prior visit.    Assessment     Patient progressing with range of motion in all  planes, patient instructed to include supine head extension exercises with towel roll with home exercise program.  Imtiaz Is progressing well towards his goals.   Pt prognosis is excellent    Pt will continue to benefit from skilled outpatient physical therapy to address the deficits listed in the problem list box on initial evaluation, provide pt/family education and to maximize pt's level of independence in the home and community environment.     Pt's spiritual, cultural and educational needs considered and pt agreeable to plan of care and goals.     Anticipated barriers to physical therapy: compliance with home exercise program     Goals:  Short Term Goals: 4 weeks   Pt will be independent with home ex program   Pt will be able to increase cervical rotation to 55 degrees   Pt  will be able to increase lateral tilt to 30 degrees bilaterally   Decrease pain at worse to  2/10      Long Term Goals: 6  weeks   Pt will be able to drive , able to rotate head at intersections pain free  Pt will be pain free with range of motion   Increase shoulder range of motion flexion and abd  to 140 degrees bilateral   Plan     Outpatient Therapy Discharge Summary     Name: Gris Hobbs  Clinic Number: [id number]    Therapy Diagnosis:   Encounter Diagnosis   Name Primary?    Decreased range of motion of neck Yes     Physician: Yobany Marsh MD        Assessment    Goals:  Pt met goals . Pt to cont with home ex program     Discharge reason: Patient has met all of his/her goals    Plan   This patient is discharged from Physical Therapy.    Tristan Navarro, PT

## 2024-04-25 NOTE — PROGRESS NOTES
Physical Therapy Treatment Note     Name: Gris Hobbs  Clinic Number: [id number]    Therapy Diagnosis:   Encounter Diagnosis   Name Primary?    Decreased range of motion of neck Yes       Physician: Yobany Marsh MD    Visit Date: 4/25/2024  Physician Orders: PT Eval and Treat    Medical Diagnosis from Referral: cervical radiculopathy   Evaluation Date: 3/28/2024  Authorization Period Expiration: 4/28/2024  Plan of Care Expiration: 4/28/2024  Visit # / Visits authorized: 5/ 20  PTA Visit #:   ..........  Time In: 1230  Time Out: 1308  Total Billable Time: 38 minutes    Precautions: (Pt has a watchman heart monitor)    Subjective     Pt reports  He was compliant with home exercise program.  Response to previous treatment:   Functional change: ongoing    Pain: 3/10  Location: bilateral neck      Objective     Range of motion  Tilt             right  33    left   16   Rotation    right  50     left   55    Imtiaz participated in neuromuscular re-education activities to improve: Kinesthetic, Sense, and Posture for 15 minutes. The following activities were included:  Upper body ergometer x 5'  Scapular retraction blue x 10  Doorway pectoralis stretch x 10  Iliopsoas stretch to get prone 5 x each side     Imtiaz participated in dynamic functional therapeutic activities to improve functional performance for 15  minutes, including:  Bilateral cervical  rotation and tilt  x 10 each to be able to look mutidirectional  while driving  Cervical extension  in sitting x 10 to look up  Pulleys x 5 to simulate reaching into cabinets  Supine with towel roll under neck to promote neck extension to look up x 2'    Imtiaz received the following direct contact modalities after being cleared for contraindications: Ultrasound:  Gris received ultrasound to manage pain and inflammation at 100 % duty cycle applied to the left upper/middle traps  at an intensity of  1.5W/cm2/ 3 mhz for a duration of 8 minutes. Patient tolerated  treatment well without adverse effects. Therapist was in attendance throughout intervention.      Home Exercises Provided and Patient Education Provided     Education provided: home exercise program     Written Home Exercises Provided: Patient instructed to cont prior HEP.  Exercises were reviewed and Imtiaz was able to demonstrate them prior to the end of the session.  Imtiaz demonstrated good  understanding of the education provided.     See EMR under Patient Instructions for exercises provided prior visit.    Assessment     Patient progressing with range of motion in all  planes, patient instructed to include supine head extension exercises with towel roll with home exercise program.  Imtiaz Is progressing well towards his goals.   Pt prognosis is excellent    Pt will continue to benefit from skilled outpatient physical therapy to address the deficits listed in the problem list box on initial evaluation, provide pt/family education and to maximize pt's level of independence in the home and community environment.     Pt's spiritual, cultural and educational needs considered and pt agreeable to plan of care and goals.     Anticipated barriers to physical therapy: compliance with home exercise program     Goals:  Short Term Goals: 4 weeks   Pt will be independent with home ex program   Pt will be able to increase cervical rotation to 55 degrees   Pt  will be able to increase lateral tilt to 30 degrees bilaterally   Decrease pain at worse to  2/10      Long Term Goals: 6  weeks   Pt will be able to drive , able to rotate head at intersections pain free  Pt will be pain free with range of motion   Increase shoulder range of motion flexion and abd  to 140 degrees bilateral   Plan       Plan of care Certification: 3/28/2024 to 4/28/2024.     Outpatient Physical Therapy 2 times weekly for 4 weeks to include the following interventions: Neuromuscular Re-ed, Patient Education, Therapeutic Activities, Therapeutic Exercise, and  Ultrasound. (Pt has a watchman heart monitor)      Plan of care has been reestablished with Chelle Shipley LPTA, Janay García LPTA, Nancy Rivers LPTA  and Chelsey Nicholson LPTA.       Tristan Navarro, PT  4/25/2024

## 2024-05-03 ENCOUNTER — OFFICE VISIT (OUTPATIENT)
Dept: PULMONOLOGY | Facility: CLINIC | Age: 79
End: 2024-05-03
Payer: MEDICARE

## 2024-05-03 VITALS
RESPIRATION RATE: 18 BRPM | BODY MASS INDEX: 25.31 KG/M2 | WEIGHT: 176.81 LBS | DIASTOLIC BLOOD PRESSURE: 86 MMHG | OXYGEN SATURATION: 100 % | HEIGHT: 70 IN | SYSTOLIC BLOOD PRESSURE: 135 MMHG | HEART RATE: 60 BPM

## 2024-05-03 DIAGNOSIS — R05.2 SUBACUTE COUGH: Primary | ICD-10-CM

## 2024-05-03 DIAGNOSIS — J45.20 MILD INTERMITTENT ASTHMA WITHOUT COMPLICATION: ICD-10-CM

## 2024-05-03 DIAGNOSIS — R06.02 SHORTNESS OF BREATH: ICD-10-CM

## 2024-05-03 PROCEDURE — 99999 PR PBB SHADOW E&M-EST. PATIENT-LVL V: CPT | Mod: PBBFAC,,, | Performed by: STUDENT IN AN ORGANIZED HEALTH CARE EDUCATION/TRAINING PROGRAM

## 2024-05-03 PROCEDURE — 99204 OFFICE O/P NEW MOD 45 MIN: CPT | Mod: S$PBB,,, | Performed by: STUDENT IN AN ORGANIZED HEALTH CARE EDUCATION/TRAINING PROGRAM

## 2024-05-03 PROCEDURE — 99215 OFFICE O/P EST HI 40 MIN: CPT | Mod: PBBFAC | Performed by: STUDENT IN AN ORGANIZED HEALTH CARE EDUCATION/TRAINING PROGRAM

## 2024-05-03 RX ORDER — CLOPIDOGREL BISULFATE 75 MG/1
1 TABLET ORAL DAILY
COMMUNITY
Start: 2024-04-25 | End: 2025-04-25

## 2024-05-03 RX ORDER — FLUTICASONE PROPIONATE AND SALMETEROL 55; 14 UG/1; UG/1
1 POWDER, METERED RESPIRATORY (INHALATION) 2 TIMES DAILY
Qty: 1 EACH | Refills: 11 | Status: SHIPPED | OUTPATIENT
Start: 2024-05-03 | End: 2025-05-03

## 2024-05-03 RX ORDER — METOPROLOL SUCCINATE 25 MG/1
TABLET, EXTENDED RELEASE ORAL
COMMUNITY
Start: 2024-04-15

## 2024-05-07 ENCOUNTER — TELEPHONE (OUTPATIENT)
Dept: PULMONOLOGY | Facility: CLINIC | Age: 79
End: 2024-05-07
Payer: MEDICARE

## 2024-05-07 NOTE — TELEPHONE ENCOUNTER
After rescheduling his appointment,  stated his concerns about the settings on his Acapella. He's not sure if he has to keep the settings on one or he can change them. He said he does not remember addressing that with . I told him I would pass the information along.

## 2024-05-07 NOTE — TELEPHONE ENCOUNTER
Spoke to the patient voiced per  (no set limits on the device/can go up to as much as the patient can tolerate) Patient understood. No other questions or concerns at this time.

## 2024-05-09 ENCOUNTER — OFFICE VISIT (OUTPATIENT)
Dept: DERMATOLOGY | Facility: CLINIC | Age: 79
End: 2024-05-09
Payer: MEDICARE

## 2024-05-09 DIAGNOSIS — Z85.828 HISTORY OF NONMELANOMA SKIN CANCER: ICD-10-CM

## 2024-05-09 DIAGNOSIS — L57.0 ACTINIC KERATOSES: Primary | ICD-10-CM

## 2024-05-09 DIAGNOSIS — I87.2 VENOUS INSUFFICIENCY: ICD-10-CM

## 2024-05-09 DIAGNOSIS — L82.1 SEBORRHEIC KERATOSES: ICD-10-CM

## 2024-05-09 PROCEDURE — 17004 DESTROY PREMAL LESIONS 15/>: CPT | Mod: ,,, | Performed by: STUDENT IN AN ORGANIZED HEALTH CARE EDUCATION/TRAINING PROGRAM

## 2024-05-09 PROCEDURE — 99213 OFFICE O/P EST LOW 20 MIN: CPT | Mod: 25,,, | Performed by: STUDENT IN AN ORGANIZED HEALTH CARE EDUCATION/TRAINING PROGRAM

## 2024-05-09 NOTE — PROGRESS NOTES
[facility]  Thomas Lubin MD    [address] [place] [address]  [phone]    Fax: [phone]    Patient Name: Gris Hobbs  Medical Record Number: [id number]  PCP: Yobany Marsh MD  Age: 78 y.o. : 1945  Contact: [phone] (home)     History of Present Illness:     Gris Hobbs is a 78 y.o.  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 Today,  he is concerned about a hypertropic scar that was previously treated with ILK injections with improvement, but he is concerned about discoloration and tightness.    The patient has no other concerns today.    Review of Systems:     Unremarkable other than mentioned above.     Physical Exam:     General: Relaxed, oriented, alert    Skin examination of the scalp, face, neck, chest, back, abdomen, upper extremities and lower extremities were normal except for as listed below      Assessment and Plan:     1. History of NMSC   Well-healed scar on right radial forearm   No e/o recurrence   Recommend sunscreen and good photoprotection       2. Actinic Keratoses  Erythematous, scaly papules on right ear, scalp, right temple, right eyebrow, left cheek, left ear, left hand, left arm, right hand, right arm,     Plan: Liquid Nitrogen.  A total of 20 lesions were treated with liquid nitrogen for 2 freeze-thaw cycles lasting 5 seconds, located on the above locations.   The patient's consent was obtained including but not limited to risks of crusting, scabbing,  blistering, scarring, darker or lighter pigmentary change, recurrence, incomplete removal and infection.    Counseling.  Sun protective clothing and broad spectrum sunscreen can prevent the formation of AK.   AKs can be treated with cryotherapy, photodynamic therapy, imiquimod, topical 5-FU.  Actinic Keratoses are precancerous proliferations that occur within sun  damaged skin. If untreated,  a small subset of AKs can develop into Squamous Cell Carcinoma.      3. Seborrheic keratoses   - brown stuck on appearing papules/plaques  - patient educated on benign nature. No treatment necessary unless they become irritated or inflamed     4. Venous insufficiency - noted discoloration and swelling will refer to Dr. Nile Chong to evaluate for venous insufficiency     Return to clinic in 4 months for FSE    AVS printed with patient instructions     Thomas Lubin MD   Mohs Surgery/Dermatologic Oncology  Dermatology

## 2024-05-24 ENCOUNTER — HOSPITAL ENCOUNTER (OUTPATIENT)
Dept: RADIOLOGY | Facility: HOSPITAL | Age: 79
Discharge: HOME | End: 2024-05-24
Attending: STUDENT IN AN ORGANIZED HEALTH CARE EDUCATION/TRAINING PROGRAM
Payer: MEDICARE

## 2024-05-24 DIAGNOSIS — R05.2 SUBACUTE COUGH: ICD-10-CM

## 2024-05-24 PROCEDURE — 71250 CT THORAX DX C-: CPT | Mod: 26,,, | Performed by: RADIOLOGY

## 2024-05-24 PROCEDURE — 71250 CT THORAX DX C-: CPT | Mod: TC

## 2024-05-30 ENCOUNTER — TELEPHONE (OUTPATIENT)
Dept: PULMONOLOGY | Facility: CLINIC | Age: 79
End: 2024-05-30
Payer: MEDICARE

## 2024-05-30 NOTE — TELEPHONE ENCOUNTER
----- Message from Antionette Flores sent at 5/30/2024 10:30 AM CDT -----  Patient called wanting to speak to a nurse about his CT scan results. A good phone number to reach him is [phone]

## 2024-05-31 ENCOUNTER — CLINICAL SUPPORT (OUTPATIENT)
Dept: PULMONOLOGY | Facility: HOSPITAL | Age: 79
End: 2024-05-31
Attending: STUDENT IN AN ORGANIZED HEALTH CARE EDUCATION/TRAINING PROGRAM
Payer: MEDICARE

## 2024-05-31 ENCOUNTER — TELEPHONE (OUTPATIENT)
Dept: CRITICAL CARE MEDICINE | Facility: HOSPITAL | Age: 79
End: 2024-05-31
Payer: MEDICARE

## 2024-05-31 ENCOUNTER — CLINICAL SUPPORT (OUTPATIENT)
Dept: PULMONOLOGY | Facility: HOSPITAL | Age: 79
End: 2024-05-31
Attending: FAMILY MEDICINE
Payer: MEDICARE

## 2024-05-31 VITALS — OXYGEN SATURATION: 98 % | BODY MASS INDEX: 24.24 KG/M2 | WEIGHT: 179 LBS | HEIGHT: 72 IN

## 2024-05-31 DIAGNOSIS — R05.2 SUBACUTE COUGH: ICD-10-CM

## 2024-05-31 PROCEDURE — 94726 PLETHYSMOGRAPHY LUNG VOLUMES: CPT

## 2024-05-31 PROCEDURE — 27100098 HC SPACER

## 2024-05-31 PROCEDURE — 94618 PULMONARY STRESS TESTING: CPT

## 2024-05-31 PROCEDURE — 94060 EVALUATION OF WHEEZING: CPT

## 2024-05-31 PROCEDURE — 94729 DIFFUSING CAPACITY: CPT

## 2024-05-31 NOTE — TELEPHONE ENCOUNTER
Called patient and discuss results of CT chest.  He has calcified lung nodules which are reassuring in nature and do not require further follow-up. R lower pericardiac atelectatic region a/w spine. Remainder of lung parenchyma is normal.  Airways are normal.

## 2024-05-31 NOTE — TELEPHONE ENCOUNTER
Patient is here this morning to do PFT, he is not due to come back to you till 6/10/2024 but his wife is having surgery on 6/7/2024 so they are trying to do office visit before then. [facility] called asking.. review and advise please   Sent message to Dr. Yeh

## 2024-06-04 ENCOUNTER — OFFICE VISIT (OUTPATIENT)
Dept: PULMONOLOGY | Facility: CLINIC | Age: 79
End: 2024-06-04
Payer: MEDICARE

## 2024-06-04 VITALS
HEIGHT: 71 IN | RESPIRATION RATE: 16 BRPM | BODY MASS INDEX: 25.06 KG/M2 | WEIGHT: 179 LBS | OXYGEN SATURATION: 99 % | SYSTOLIC BLOOD PRESSURE: 126 MMHG | DIASTOLIC BLOOD PRESSURE: 68 MMHG | HEART RATE: 66 BPM

## 2024-06-04 DIAGNOSIS — R06.02 SHORTNESS OF BREATH: ICD-10-CM

## 2024-06-04 DIAGNOSIS — J45.20 MILD INTERMITTENT ASTHMA WITHOUT COMPLICATION: Primary | ICD-10-CM

## 2024-06-04 DIAGNOSIS — R05.2 SUBACUTE COUGH: ICD-10-CM

## 2024-06-04 PROCEDURE — 99213 OFFICE O/P EST LOW 20 MIN: CPT | Mod: S$PBB,,, | Performed by: STUDENT IN AN ORGANIZED HEALTH CARE EDUCATION/TRAINING PROGRAM

## 2024-06-04 PROCEDURE — 99999 PR PBB SHADOW E&M-EST. PATIENT-LVL V: CPT | Mod: PBBFAC,,, | Performed by: STUDENT IN AN ORGANIZED HEALTH CARE EDUCATION/TRAINING PROGRAM

## 2024-06-04 PROCEDURE — 99215 OFFICE O/P EST HI 40 MIN: CPT | Mod: PBBFAC | Performed by: STUDENT IN AN ORGANIZED HEALTH CARE EDUCATION/TRAINING PROGRAM

## 2024-06-09 DIAGNOSIS — Z71.89 COMPLEX CARE COORDINATION: ICD-10-CM

## 2024-06-13 PROCEDURE — 94729 DIFFUSING CAPACITY: CPT | Mod: 26,,, | Performed by: STUDENT IN AN ORGANIZED HEALTH CARE EDUCATION/TRAINING PROGRAM

## 2024-06-13 PROCEDURE — 94060 EVALUATION OF WHEEZING: CPT | Mod: 26,59,, | Performed by: STUDENT IN AN ORGANIZED HEALTH CARE EDUCATION/TRAINING PROGRAM

## 2024-06-13 PROCEDURE — 94727 GAS DIL/WSHOT DETER LNG VOL: CPT | Mod: 26,,, | Performed by: STUDENT IN AN ORGANIZED HEALTH CARE EDUCATION/TRAINING PROGRAM

## 2024-06-13 PROCEDURE — 94618 PULMONARY STRESS TESTING: CPT | Mod: 26,,, | Performed by: STUDENT IN AN ORGANIZED HEALTH CARE EDUCATION/TRAINING PROGRAM

## 2024-06-13 NOTE — PROCEDURES
SIX MINUTE WALK DISTANCE  BASELINE VITALS  BP:  144/90  HR:  90  SPO2:  99% on room air  Modified CHRISTIN Dyspnea Scale Score:  2  Modified CHRISTIN Fatigue Scale Score:  2    END OF STUDY VITALS:  BP:  155/92  HR:  113  SPO2:  99% on room air  Modified CHRISTIN Dyspnea Scale Score:  4  Modified CHRISTIN Fatigue Scale Score:  4    Patient walked 910 feet which is 54% of expected distance  SpO2 anish was 93 % at 2 minutes.   Oxygen was not used      Reji Yeh MD  Interventional Pulmonary and Critical Care  [facility]

## 2024-06-13 NOTE — PROCEDURES
PFT REPORT:  SPIROMETRY:  The pre-BD FVC is moderately reduced, 2.89 L/71 % predicted  The pre-BD FEV1 is moderately reduced, 1.96 L/65 % predicted.  The pre-BD FEV1/FVC ratio is reduced,  68/91 % predicted    The post-BD FVC is moderately reduced, 2.96 L/73 % predicted  The post-BD FEV1 is moderately reduced, 2.1 L/70 % predicted  The post-BD FEV1/FVC ratio is normal,  71/95 % predicted    There is no significant bronchodilator effect.    LUNG VOLUMES:  The TLC is borderline normal, 5.81 L/80 % predicted.  The RV is 2.92 L/109 % predicted.  The RV/TLC is  50/132 % predicted.  The FRC is  4.5 L/117 % predicted.  The ERV is  1.66 L/134 % predicted.      DIFFUSION CAPACITY:  The diffusion capacity is corrected for hemoglobin and is mildly decreased, 17.84/71 % predicted.    Interpretation:    Spirometry is consistent with mild ventilatory impairment and obstructive lung disease.    Lung volumes are grossly normal.  Diffusion capacity is mildly decreased.  The combination of above results are suggestive of obstructive lung disease with a mild decrease in DLCO.  Absence of bronchodilator response on pulmonary function tests may not always predict a clinical response to bronchodilators.    Reji Yeh MD  Interventional Pulmonary and Critical Care  [facility]

## 2024-07-12 DIAGNOSIS — I48.0 PAROXYSMAL ATRIAL FIBRILLATION: ICD-10-CM

## 2024-07-12 RX ORDER — DILTIAZEM HYDROCHLORIDE 240 MG/1
240 CAPSULE, COATED, EXTENDED RELEASE ORAL DAILY
Qty: 90 CAPSULE | Refills: 3 | Status: SHIPPED | OUTPATIENT
Start: 2024-07-12

## 2024-07-31 ENCOUNTER — INITIAL CONSULT (OUTPATIENT)
Dept: VASCULAR SURGERY | Facility: CLINIC | Age: 79
End: 2024-07-31
Payer: MEDICARE

## 2024-07-31 VITALS
RESPIRATION RATE: 16 BRPM | HEIGHT: 71 IN | DIASTOLIC BLOOD PRESSURE: 73 MMHG | SYSTOLIC BLOOD PRESSURE: 133 MMHG | HEART RATE: 79 BPM | BODY MASS INDEX: 27.44 KG/M2 | WEIGHT: 196 LBS

## 2024-07-31 DIAGNOSIS — R60.0 EDEMA, LOWER EXTREMITY: ICD-10-CM

## 2024-07-31 DIAGNOSIS — M79.605 LEG PAIN, BILATERAL: Primary | ICD-10-CM

## 2024-07-31 DIAGNOSIS — L81.9 HYPERPIGMENTATION OF SKIN: ICD-10-CM

## 2024-07-31 DIAGNOSIS — M79.604 LEG PAIN, BILATERAL: Primary | ICD-10-CM

## 2024-07-31 PROCEDURE — 99203 OFFICE O/P NEW LOW 30 MIN: CPT | Mod: S$PBB,,, | Performed by: FAMILY MEDICINE

## 2024-07-31 PROCEDURE — 99999 PR PBB SHADOW E&M-EST. PATIENT-LVL V: CPT | Mod: PBBFAC,,, | Performed by: FAMILY MEDICINE

## 2024-07-31 PROCEDURE — 99215 OFFICE O/P EST HI 40 MIN: CPT | Mod: PBBFAC | Performed by: FAMILY MEDICINE

## 2024-07-31 RX ORDER — VALACYCLOVIR HYDROCHLORIDE 1 G/1
1000 TABLET, FILM COATED ORAL 3 TIMES DAILY
COMMUNITY
Start: 2024-06-13

## 2024-07-31 RX ORDER — GABAPENTIN 300 MG/1
300 CAPSULE ORAL 3 TIMES DAILY
COMMUNITY
Start: 2024-07-23

## 2024-07-31 NOTE — PROGRESS NOTES
VEIN CENTER CLINIC NOTE    Patient ID: Gris Hobbs is a 78 y.o. male.    I. HISTORY     Chief Complaint:   Chief Complaint   Patient presents with    Chronic Venous Insufficiency     NP; REF BY MIKE RODARTE; VENOUS INSUFFICIENCY         HPI: Gris Hobbs is a 78 y.o. male who is referred here today by Dr. Joseph, Dermatology, for evaluation of lower extremity edema, hyperpigmentation and pain.  Symptoms are progressive/worsening and began greater than 2 years ago.  Location is bilateral lower extremities below the knees. Symptoms are worse at the end of the day.  History of venous interventions includes none.  Denies family history of venous disease.  Denies history of DVT or cellulitis.      Venous Disease Medical Necessity Documentation Initial Visit Date:  07/31/2024 Return Check Date:    Have you ever had a rupture or bleed from a varicose vein in your leg(s)?              [] Yes  [x] No   [] Yes   [] No   Have you ever been diagnosed with phlebitis, cellulitis, or inflammation in the area of the varicose veins of  your leg(s)?  [] Yes  [x] No    [] Yes   [] No   Do you have darkened or inflamed skin on your legs?   [x] Yes   [] No   [] Yes   [] No   Do you have leg swelling?     [x] Yes   [] No   [] Yes   [] No   Do you have leg pain?   [] Yes   [x] No   [] Yes   [] No   If yes, describe the type of pain?    []   Stabbing []  Radiating []  Aching   []  Tightness []  Throbbing               []  Burning []  Cramping              Do you have leg discomfort?   [x] Yes   [] No   [] Yes   [] No   If yes, describe the type of discomfort?    []  Heaviness []  Fullness   [x]  Restlessness [x] Tired/Fatigued [] Itching              Have you ever worn compression hose?   [x] Yes   [] No   [] Yes   [] No   If yes, how long?    8+ YEARS       Do you elevate your legs while sitting?   [x] Yes   [] No   [] Yes   [] No   Does venous disease (varicose veins, ulcers, skin changes, leg pain/swelling) interfere with  your daily life?  If yes, check activities you are limited or unable to do.    []  Shower  []   Walk  []  Exercise  [] Play with children/grandchildren  []  Shop [] Work [] Stand for any period of time [] Sleep                               [x] Sitting for an extended period of time.           [x] Yes   [] No   [] Yes   [] No   Do you exercise/have you tried to exercise (i.e.  Walk our participate in a regular exercise routine)?  [] Yes  [x] No   [] Yes   [] No   BMI   27.34           Past Medical History:   Diagnosis Date    Atrial fibrillation     Coronary artery disease involving native coronary artery of native heart without angina pectoris     Gout     Hyperlipidemia     Hypertension     Ischemic cardiomyopathy     NSTEMI (non-ST elevated myocardial infarction) 10/2019        Past Surgical History:   Procedure Laterality Date    APPENDECTOMY      BASAL CELL CARCINOMA EXCISION      BIOPSY WITH TRANSRECTAL ULTRASOUND (TRUS) GUIDANCE      CARDIAC CATHETERIZATION      LAPAROSCOPIC CHOLECYSTECTOMY N/A 01/04/2023    Procedure: CHOLECYSTECTOMY, LAPAROSCOPIC;  Surgeon: Pablito Feliciano MD;  Location: [facility], [place];  Service: General;  Laterality: N/A;    SQUAMOUS CELL CARCINOMA EXCISION         Social History     Tobacco Use   Smoking Status Never    Passive exposure: Never   Smokeless Tobacco Never         Current Outpatient Medications:     albuterol (VENTOLIN HFA) 90 mcg/actuation inhaler, Inhale 2 puffs into the lungs every 6 (six) hours as needed for Wheezing. Rescue, Disp: 18 g, Rfl: 2    allopurinoL (ZYLOPRIM) 300 MG tablet, Take 1 tablet (300 mg total) by mouth once daily., Disp: 90 tablet, Rfl: 6    aspirin (ECOTRIN) 81 MG EC tablet, Take 81 mg by mouth once daily., Disp: , Rfl:     cholecalciferol, vitamin D3, (VITAMIN D3) 125 mcg (5,000 unit) Tab, Take 5,000 Units by mouth once daily., Disp: , Rfl:     clopidogreL (PLAVIX) 75 mg tablet, Take 1 tablet by mouth once daily., Disp: , Rfl:     cyanocobalamin 1,000  mcg/mL injection, INJECT 1 ML INTRAMUSCULARLY ONCE monthly, Disp: 1 mL, Rfl: 4    diltiaZEM (CARDIZEM CD) 240 MG 24 hr capsule, Take 1 capsule (240 mg total) by mouth once daily., Disp: 90 capsule, Rfl: 3    fluticasone propion-salmeteroL 55-14 mcg/actuation AePB, Inhale 1 puff into the lungs 2 (two) times a day., Disp: 1 each, Rfl: 11    gabapentin (NEURONTIN) 300 MG capsule, Take 300 mg by mouth 3 (three) times daily., Disp: , Rfl:     metoprolol succinate (TOPROL-XL) 25 MG 24 hr tablet, , Disp: , Rfl:     multivitamin (ONE DAILY MULTIVITAMIN) per tablet, Take 1 tablet by mouth once daily., Disp: , Rfl:     mupirocin (BACTROBAN) 2 % ointment, Apply topically once daily., Disp: 22 g, Rfl: 5    pantoprazole (PROTONIX) 40 MG tablet, Take 1 tablet (40 mg total) by mouth once daily., Disp: 90 tablet, Rfl: 3    valACYclovir (VALTREX) 1000 MG tablet, Take 1,000 mg by mouth 3 (three) times daily., Disp: , Rfl:     amiodarone (PACERONE) 200 MG Tab, Take 200 mg by mouth., Disp: , Rfl:     apixaban (ELIQUIS) 5 mg Tab, Take 1 tablet (5 mg total) by mouth 2 (two) times daily., Disp: 60 tablet, Rfl: 5    ezetimibe (ZETIA) 10 mg tablet, Take 1 tablet (10 mg total) by mouth once daily., Disp: 90 tablet, Rfl: 1    HYDROcodone-acetaminophen (NORCO) 5-325 mg per tablet, Take 1 tablet by mouth every 6 (six) hours as needed for Pain., Disp: 15 tablet, Rfl: 0    rosuvastatin (CRESTOR) 5 MG tablet, Take 1 tablet (5 mg total) by mouth once daily., Disp: 90 tablet, Rfl: 1    Review of Systems   Constitutional:  Negative for activity change, chills, diaphoresis, fatigue and fever.   Respiratory:  Negative for cough and shortness of breath.    Cardiovascular:  Positive for leg swelling. Negative for chest pain and claudication.        Hyperpigmentation LE   Gastrointestinal:  Negative for nausea and vomiting.   Musculoskeletal:  Positive for leg pain. Negative for joint swelling.   Integumentary:  Negative for rash and wound.    Neurological:  Negative for weakness and numbness.          II. PHYSICAL EXAM     Physical Exam  Constitutional:       General: He is awake. He is not in acute distress.     Appearance: Normal appearance. He is not ill-appearing or toxic-appearing.   HENT:      Head: Normocephalic and atraumatic.   Eyes:      Extraocular Movements: Extraocular movements intact.      Conjunctiva/sclera: Conjunctivae normal.      Pupils: Pupils are equal, round, and reactive to light.   Neck:      Vascular: No carotid bruit or JVD.   Cardiovascular:      Rate and Rhythm: Normal rate and regular rhythm.      Pulses:           Dorsalis pedis pulses are detected w/ Doppler on the right side and detected w/ Doppler on the left side.        Posterior tibial pulses are detected w/ Doppler on the right side and detected w/ Doppler on the left side.      Heart sounds: No murmur heard.  Pulmonary:      Effort: Pulmonary effort is normal. No respiratory distress.      Breath sounds: No stridor. No wheezing, rhonchi or rales.   Musculoskeletal:         General: No swelling, tenderness or deformity.      Right lower le+ Edema present.      Left lower le+ Edema present.      Comments: Hyperpigmentation and edema of the bilateral lower extremities without evidence of cellulitis or open ulceration.   Feet:      Comments: Triphasic hand-held dopplerable pulses of the bilateral dorsalis pedis and posterior tibial arteries.  Skin:     General: Skin is warm.      Capillary Refill: Capillary refill takes less than 2 seconds.      Coloration: Skin is not ashen.      Findings: No bruising, erythema, lesion, rash or wound.   Neurological:      Mental Status: He is alert and oriented to person, place, and time.      Motor: No weakness.   Psychiatric:         Speech: Speech normal.         Behavior: Behavior normal. Behavior is cooperative.         Reticular/Spider veins noted:  RLE: anterior calf, medial calf, posterior calf, and lateral calf  LLE:  anterior calf, medial calf, posterior calf, and lateral calf    Varicose veins noted:  RLE: anterior calf and medial calf  LLE:  none    CEAP Classification                       Venous Clinical Severity Score     III. ASSESSMENT & PLAN (MEDICAL DECISION MAKING)     1. Leg pain, bilateral    2. Hyperpigmentation of skin    3. Edema, lower extremity        Assessment/Diagnosis and Plan:  Patient has complaints, symptoms and physical exam findings of chronic venous disease.  Therefore, I will order a bilateral complete venous reflux study and see the patient back with results.    Orders Placed This Encounter    US Venous Reflux Study Bilateral          Santosh Chong DO

## 2024-08-05 RX ORDER — ALLOPURINOL 300 MG/1
300 TABLET ORAL
Qty: 90 TABLET | Refills: 0 | Status: SHIPPED | OUTPATIENT
Start: 2024-08-05

## 2024-08-15 ENCOUNTER — OFFICE VISIT (OUTPATIENT)
Dept: OTOLARYNGOLOGY | Facility: CLINIC | Age: 79
End: 2024-08-15
Payer: MEDICARE

## 2024-08-15 VITALS — HEIGHT: 72 IN | WEIGHT: 180 LBS | BODY MASS INDEX: 24.38 KG/M2

## 2024-08-15 DIAGNOSIS — J32.9 CHRONIC SINUSITIS, UNSPECIFIED LOCATION: Primary | ICD-10-CM

## 2024-08-15 DIAGNOSIS — J34.89 PURULENT RHINORRHEA: ICD-10-CM

## 2024-08-15 PROCEDURE — 99999 PR PBB SHADOW E&M-EST. PATIENT-LVL IV: CPT | Mod: PBBFAC,,, | Performed by: OTOLARYNGOLOGY

## 2024-08-15 PROCEDURE — 99204 OFFICE O/P NEW MOD 45 MIN: CPT | Mod: S$PBB,,, | Performed by: OTOLARYNGOLOGY

## 2024-08-15 PROCEDURE — 99214 OFFICE O/P EST MOD 30 MIN: CPT | Mod: PBBFAC | Performed by: OTOLARYNGOLOGY

## 2024-08-15 RX ORDER — CLINDAMYCIN HYDROCHLORIDE 300 MG/1
300 CAPSULE ORAL 2 TIMES DAILY
Qty: 28 CAPSULE | Refills: 0 | Status: SHIPPED | OUTPATIENT
Start: 2024-08-15

## 2024-08-15 NOTE — PROGRESS NOTES
Subjective:       Patient ID: Gris Hobbs is a 78 y.o. male.    Chief Complaint: Sinusitis (Right nostril sinus drainage. Pt states right nostril has constant clear, thin mucus running all the time, in the mornings mucus seems a little thicker.  Pt has been on claritin, flonase and OTC meds. )    Sinusitis  Associated symptoms include congestion.     Review of Systems   HENT:  Positive for congestion and rhinorrhea.    All other systems reviewed and are negative.      Objective:      Physical Exam  General: NAD  Head: Normocephalic, atraumatic, no facial asymmetry/normal strength,  Ears: Both auricules normal in appearance, w/o deformities tympanic membranes normal external auditory canals normal  Nose: External nose w/o deformities normal turbinates Purulent drainage right   Oral Cavity: Lips, gums, floor of mouth, tongue hard palate, and buccal mucosa without mass/lesion  Oropharynx: Mucosa pink and moist, soft palate, posterior pharynx and oropharyngeal wall without mass/lesion  Neck: Supple, symmetric, trachea midline, no palpable mass/lesion, no palpable cervical lymphadenopathy  Skin: Warm and dry, no concerning lesions  Respiratory: Respirations even, unlabored  Assessment:       1. Chronic sinusitis, unspecified location    2. Purulent rhinorrhea        Plan:       [place] CT sinus   F/u  after scan

## 2024-08-28 ENCOUNTER — OFFICE VISIT (OUTPATIENT)
Dept: VASCULAR SURGERY | Facility: CLINIC | Age: 79
End: 2024-08-28
Attending: FAMILY MEDICINE
Payer: MEDICARE

## 2024-08-28 ENCOUNTER — HOSPITAL ENCOUNTER (OUTPATIENT)
Dept: RADIOLOGY | Facility: HOSPITAL | Age: 79
Discharge: HOME | End: 2024-08-28
Attending: FAMILY MEDICINE
Payer: MEDICARE

## 2024-08-28 VITALS
HEIGHT: 72 IN | RESPIRATION RATE: 16 BRPM | BODY MASS INDEX: 24.38 KG/M2 | DIASTOLIC BLOOD PRESSURE: 63 MMHG | SYSTOLIC BLOOD PRESSURE: 122 MMHG | HEART RATE: 63 BPM | WEIGHT: 180 LBS

## 2024-08-28 DIAGNOSIS — L81.9 HYPERPIGMENTATION OF SKIN: Primary | ICD-10-CM

## 2024-08-28 DIAGNOSIS — M79.604 LEG PAIN, BILATERAL: ICD-10-CM

## 2024-08-28 DIAGNOSIS — M79.605 LEG PAIN, BILATERAL: ICD-10-CM

## 2024-08-28 DIAGNOSIS — I87.2 VENOUS INSUFFICIENCY: ICD-10-CM

## 2024-08-28 DIAGNOSIS — R60.0 EDEMA, LOWER EXTREMITY: ICD-10-CM

## 2024-08-28 PROCEDURE — 93970 EXTREMITY STUDY: CPT | Mod: TC

## 2024-08-28 PROCEDURE — 93970 EXTREMITY STUDY: CPT | Mod: 26,,, | Performed by: FAMILY MEDICINE

## 2024-08-28 PROCEDURE — 99215 OFFICE O/P EST HI 40 MIN: CPT | Mod: PBBFAC,25 | Performed by: FAMILY MEDICINE

## 2024-08-28 PROCEDURE — 99214 OFFICE O/P EST MOD 30 MIN: CPT | Mod: S$PBB,,, | Performed by: FAMILY MEDICINE

## 2024-08-28 PROCEDURE — 99999 PR PBB SHADOW E&M-EST. PATIENT-LVL V: CPT | Mod: PBBFAC,,, | Performed by: FAMILY MEDICINE

## 2024-08-28 NOTE — PROGRESS NOTES
[facility] CLINIC NOTE    Patient ID: Girs Hobbs is a 78 y.o. male.    I. HISTORY     Chief Complaint:   Chief Complaint   Patient presents with    Follow-up     [facility] COMP RESULTS        HPI: Gris Hobbs is a 78 y.o. male who presents today for a bilateral complete venous reflux study today 08/29/2024 and the results were discussed with the patient.  This study shows no evidence of DVT bilaterally.  The study also shows dilation and axial reflux of the bilateral great and bilateral small saphenous veins.    The patient was initially seen on 07/31/2024 by referral from Dr. Joseph, Dermatology, for evaluation of lower extremity edema, hyperpigmentation and pain.  Symptoms are progressive/worsening and began greater than 2 years ago.  Location is bilateral lower extremities below the knees. Symptoms are worse at the end of the day.  History of venous interventions includes none.  Denies family history of venous disease.  Denies history of DVT or cellulitis.    Venous Disease Medical Necessity Documentation Initial Visit Date:  07/31/2024 Return Check Date:    Have you ever had a rupture or bleed from a varicose vein in your leg(s)?              [] Yes  [x] No   [] Yes   [] No   Have you ever been diagnosed with phlebitis, cellulitis, or inflammation in the area of the varicose veins of  your leg(s)?  [] Yes  [x] No    [] Yes   [] No   Do you have darkened or inflamed skin on your legs?   [x] Yes   [] No   [] Yes   [] No   Do you have leg swelling?     [x] Yes   [] No   [] Yes   [] No   Do you have leg pain?   [] Yes   [x] No   [] Yes   [] No   If yes, describe the type of pain?    []   Stabbing []  Radiating []  Aching   []  Tightness []  Throbbing               []  Burning []  Cramping              Do you have leg discomfort?   [x] Yes   [] No   [] Yes   [] No   If yes, describe the type of discomfort?    []  Heaviness []  Fullness   [x]  Restlessness [x] Tired/Fatigued [] Itching              Have you  ever worn compression hose?   [x] Yes   [] No   [] Yes   [] No   If yes, how long?    8+ YEARS       Do you elevate your legs while sitting?   [x] Yes   [] No   [] Yes   [] No   Does venous disease (varicose veins, ulcers, skin changes, leg pain/swelling) interfere with your daily life?  If yes, check activities you are limited or unable to do.    []  Shower  []   Walk  []  Exercise  [] Play with children/grandchildren  []  Shop [] Work [] Stand for any period of time [] Sleep                               [x] Sitting for an extended period of time.           [x] Yes   [] No   [] Yes   [] No   Do you exercise/have you tried to exercise (i.e.  Walk our participate in a regular exercise routine)?  [] Yes  [x] No   [] Yes   [] No   BMI   27.34           Past Medical History:   Diagnosis Date    Atrial fibrillation     Coronary artery disease involving native coronary artery of native heart without angina pectoris     Gout     Hyperlipidemia     Hypertension     Ischemic cardiomyopathy     NSTEMI (non-ST elevated myocardial infarction) 10/2019        Past Surgical History:   Procedure Laterality Date    APPENDECTOMY      BASAL CELL CARCINOMA EXCISION      BIOPSY WITH TRANSRECTAL ULTRASOUND (TRUS) GUIDANCE      CARDIAC CATHETERIZATION      LAPAROSCOPIC CHOLECYSTECTOMY N/A 01/04/2023    Procedure: CHOLECYSTECTOMY, LAPAROSCOPIC;  Surgeon: Pablito Feliciano MD;  Location: [facility];  Service: General;  Laterality: N/A;    SQUAMOUS CELL CARCINOMA EXCISION         Social History     Tobacco Use   Smoking Status Never    Passive exposure: Never   Smokeless Tobacco Never         Current Outpatient Medications:     albuterol (VENTOLIN HFA) 90 mcg/actuation inhaler, Inhale 2 puffs into the lungs every 6 (six) hours as needed for Wheezing. Rescue, Disp: 18 g, Rfl: 2    allopurinoL (ZYLOPRIM) 300 MG tablet, Take 1 tablet by mouth once daily, Disp: 90 tablet, Rfl: 0    apixaban (ELIQUIS) 5 mg Tab, Take 1 tablet (5 mg total) by mouth  2 (two) times daily., Disp: 60 tablet, Rfl: 5    aspirin (ECOTRIN) 81 MG EC tablet, Take 81 mg by mouth once daily., Disp: , Rfl:     cholecalciferol, vitamin D3, (VITAMIN D3) 125 mcg (5,000 unit) Tab, Take 5,000 Units by mouth once daily., Disp: , Rfl:     clindamycin (CLEOCIN) 300 MG capsule, Take 1 capsule (300 mg total) by mouth 2 (two) times a day., Disp: 28 capsule, Rfl: 0    clopidogreL (PLAVIX) 75 mg tablet, Take 1 tablet by mouth once daily., Disp: , Rfl:     cyanocobalamin 1,000 mcg/mL injection, INJECT 1 ML INTRAMUSCULARLY ONCE monthly, Disp: 1 mL, Rfl: 4    diltiaZEM (CARDIZEM CD) 240 MG 24 hr capsule, Take 1 capsule (240 mg total) by mouth once daily., Disp: 90 capsule, Rfl: 3    fluticasone propion-salmeteroL 55-14 mcg/actuation AePB, Inhale 1 puff into the lungs 2 (two) times a day., Disp: 1 each, Rfl: 11    gabapentin (NEURONTIN) 300 MG capsule, Take 300 mg by mouth 3 (three) times daily., Disp: , Rfl:     HYDROcodone-acetaminophen (NORCO) 5-325 mg per tablet, Take 1 tablet by mouth every 6 (six) hours as needed for Pain., Disp: 15 tablet, Rfl: 0    metoprolol succinate (TOPROL-XL) 25 MG 24 hr tablet, , Disp: , Rfl:     multivitamin (ONE DAILY MULTIVITAMIN) per tablet, Take 1 tablet by mouth once daily., Disp: , Rfl:     mupirocin (BACTROBAN) 2 % ointment, Apply topically once daily., Disp: 22 g, Rfl: 5    pantoprazole (PROTONIX) 40 MG tablet, Take 1 tablet (40 mg total) by mouth once daily., Disp: 90 tablet, Rfl: 3    valACYclovir (VALTREX) 1000 MG tablet, Take 1,000 mg by mouth 3 (three) times daily., Disp: , Rfl:     amiodarone (PACERONE) 200 MG Tab, Take 200 mg by mouth., Disp: , Rfl:     ezetimibe (ZETIA) 10 mg tablet, Take 1 tablet (10 mg total) by mouth once daily., Disp: 90 tablet, Rfl: 1    rosuvastatin (CRESTOR) 5 MG tablet, Take 1 tablet (5 mg total) by mouth once daily., Disp: 90 tablet, Rfl: 1    Review of Systems   Constitutional:  Negative for activity change, chills, diaphoresis,  fatigue and fever.   Respiratory:  Negative for cough and shortness of breath.    Cardiovascular:  Positive for leg swelling. Negative for chest pain and claudication.        Hyperpigmentation LE   Gastrointestinal:  Negative for nausea and vomiting.   Musculoskeletal:  Positive for leg pain. Negative for joint swelling.   Integumentary:  Negative for rash and wound.   Neurological:  Negative for weakness and numbness.          II. PHYSICAL EXAM     Physical Exam  Constitutional:       General: He is awake. He is not in acute distress.     Appearance: Normal appearance. He is not ill-appearing or toxic-appearing.   HENT:      Head: Normocephalic and atraumatic.   Eyes:      Extraocular Movements: Extraocular movements intact.      Conjunctiva/sclera: Conjunctivae normal.      Pupils: Pupils are equal, round, and reactive to light.   Neck:      Vascular: No carotid bruit or JVD.   Cardiovascular:      Rate and Rhythm: Normal rate and regular rhythm.      Pulses:           Dorsalis pedis pulses are detected w/ Doppler on the right side and detected w/ Doppler on the left side.        Posterior tibial pulses are detected w/ Doppler on the right side and detected w/ Doppler on the left side.      Heart sounds: No murmur heard.  Pulmonary:      Effort: Pulmonary effort is normal. No respiratory distress.      Breath sounds: No stridor. No wheezing, rhonchi or rales.   Musculoskeletal:         General: No swelling, tenderness or deformity.      Right lower le+ Edema present.      Left lower le+ Edema present.      Comments: Hyperpigmentation and edema of the bilateral lower extremities without evidence of cellulitis or open ulceration.   Feet:      Comments: Triphasic hand-held dopplerable pulses of the bilateral dorsalis pedis and posterior tibial arteries.  Skin:     General: Skin is warm.      Capillary Refill: Capillary refill takes less than 2 seconds.      Coloration: Skin is not ashen.      Findings: No  bruising, erythema, lesion, rash or wound.   Neurological:      Mental Status: He is alert and oriented to person, place, and time.      Motor: No weakness.   Psychiatric:         Speech: Speech normal.         Behavior: Behavior normal. Behavior is cooperative.         Reticular/Spider veins noted:  RLE: anterior calf, medial calf, posterior calf, and lateral calf  LLE: anterior calf, medial calf, posterior calf, and lateral calf    Varicose veins noted:  RLE: anterior calf and medial calf  LLE:  none    CEAP Classification  Clinical Signs: Class 4 - Skin changes ascribed to venous disease  Etiologic Classification: Primary  Anatomic distribution: Superficial  Pathophysiologic dysfunction: Reflux                         Venous Clinical Severity Score  Pain:2=Daily, moderate activity limitation, occasional analgesics  Varicose Veins: 1=Few, scattered. Branch varicose veins  Venous Edema: 2=Afternoon edema, above ankle  Pigmentation: 1=Diffuse, but limited in area and old (brown)  Inflammation: 0=None  Induration: 0=None  Number of Active Ulcers: 0=0  Active Ulceration, Duration: 0=None  Active Ulcer Size: 0=None  Compressive Therapy: 0=Not used or not compliant  Total Score: 6       III. ASSESSMENT & PLAN (MEDICAL DECISION MAKING)     1. Hyperpigmentation of skin    2. Leg pain, bilateral    3. Edema, lower extremity    4. Venous insufficiency          Assessment/Diagnosis and Plan:  Ultrasound of lower extremities reveals positive evidence of venous insufficiency in the bilateral great saphenous and small saphenous veins.  Plan for conservative medical treatment at this time. The patient may benefit from endovenous ablation in the future.     - Start compression with 15-20 mmHg Rx stockings  - Therapeutic leg elevation  - Calf pumping exercises  - RTC 3 months for further evaluation               Santosh Chong DO

## 2024-08-29 PROBLEM — I87.2 VENOUS INSUFFICIENCY: Status: ACTIVE | Noted: 2024-08-29

## 2024-09-03 ENCOUNTER — HOSPITAL ENCOUNTER (OUTPATIENT)
Dept: RADIOLOGY | Facility: HOSPITAL | Age: 79
Discharge: HOME | End: 2024-09-03
Attending: OTOLARYNGOLOGY
Payer: MEDICARE

## 2024-09-03 ENCOUNTER — OFFICE VISIT (OUTPATIENT)
Dept: OTOLARYNGOLOGY | Facility: CLINIC | Age: 79
End: 2024-09-03
Payer: MEDICARE

## 2024-09-03 VITALS — WEIGHT: 180 LBS | BODY MASS INDEX: 24.38 KG/M2 | HEIGHT: 72 IN

## 2024-09-03 DIAGNOSIS — J32.9 CHRONIC SINUSITIS, UNSPECIFIED LOCATION: ICD-10-CM

## 2024-09-03 DIAGNOSIS — R09.81 NASAL CONGESTION: Primary | ICD-10-CM

## 2024-09-03 DIAGNOSIS — J34.89 PURULENT RHINORRHEA: ICD-10-CM

## 2024-09-03 PROCEDURE — 99214 OFFICE O/P EST MOD 30 MIN: CPT | Mod: PBBFAC,25 | Performed by: OTOLARYNGOLOGY

## 2024-09-03 PROCEDURE — 99999 PR PBB SHADOW E&M-EST. PATIENT-LVL IV: CPT | Mod: PBBFAC,,, | Performed by: OTOLARYNGOLOGY

## 2024-09-03 PROCEDURE — 70486 CT MAXILLOFACIAL W/O DYE: CPT | Mod: TC

## 2024-09-03 PROCEDURE — 99214 OFFICE O/P EST MOD 30 MIN: CPT | Mod: S$PBB,,, | Performed by: OTOLARYNGOLOGY

## 2024-09-03 PROCEDURE — 70486 CT MAXILLOFACIAL W/O DYE: CPT | Mod: 26,,, | Performed by: RADIOLOGY

## 2024-09-03 RX ORDER — AZELASTINE 1 MG/ML
1 SPRAY, METERED NASAL 2 TIMES DAILY
Qty: 30 ML | Refills: 2 | Status: SHIPPED | OUTPATIENT
Start: 2024-09-03 | End: 2025-09-03

## 2024-09-03 NOTE — PROGRESS NOTES
Subjective:       Patient ID: Gris Hobbs is a 78 y.o. male.    Chief Complaint: Follow-up (Patient following up for CT of the sinus results.)    Follow-up      Review of Systems   HENT:  Positive for postnasal drip and rhinorrhea.    All other systems reviewed and are negative.      Objective:      Physical Exam  General: NAD  Head: Normocephalic, atraumatic, no facial asymmetry/normal strength,  Ears: Both auricules normal in appearance, w/o deformities tympanic membranes normal external auditory canals normal  Nose: External nose w/o deformities normal turbinates no drainage or inflammation  Oral Cavity: Lips, gums, floor of mouth, tongue hard palate, and buccal mucosa without mass/lesion  Oropharynx: Mucosa pink and moist, soft palate, posterior pharynx and oropharyngeal wall without mass/lesion  Neck: Supple, symmetric, trachea midline, no palpable mass/lesion, no palpable cervical lymphadenopathy  Skin: Warm and dry, no concerning lesions  Respiratory: Respirations even, unlabored  Assessment:       1. Nasal congestion    2. Chronic sinusitis, unspecified location    3. Purulent rhinorrhea        Plan:     F/u 6 weeks  Right sided sinus disease on CT improving discussed meds to help with drainage not interested in surgery at this point

## 2024-09-08 NOTE — PROGRESS NOTES
Patient Name: Gris Hobbs   Primary Care Provider: Yobany Marsh MD   Date of Service:  6/4/24  Reason for Referral:  Subacute cough    Chief Complaint: Cough      Subjective:      Gris Hobbs is 78 y.o. male with Past medical history significant for subacute cough who presents today for evaluation of the same cough.      Follow up clinic visit 6/4/24  Significant improvement in the patient's cough, status post initiation of ics/Laba inhaled treatment.  Reviewed his pulmonary function tests from 5/31/24 which showed no evidence of requiring oxygen with rest or ambulation.  Mild ventilatory impairment/obstructive lung disease with very mildly decreased DLCO.  I also personally reviewed his CT chest without contrast which showed evidence of calcified granulomas bilaterally.  No significant large masses, effusion seen.      Initial clinic visit 5/3/24   The patient states that he has had this cough for a couple of months, has undergone 3 complete rounds of antibiotics without any significant improvement.  He said that it is occasionally productive, worse at night.  He also endorses significant amount of wheezing that is present.  Denies any significant fevers recently.  I reviewed his imaging myself (chest x-ray from 03/1924) with no evidence of pneumothorax, pleural effusion or any acute process.  He did have a CT abdomen pelvis back in September of 2022 which again showed no evidence of infection/consolidation in the lower lobes of the lungs.  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  On review of his CBC from 3/25/24, no evidence of significant leukocytosis, stable hemoglobin at 13.7.        Assessment and Plan      Subacute cough , improved  Occasional dyspnea on exertion   Obstructive lung disease with wheezing, improved      Assessment:  Significant improvement status post initiation of ics/Lama indicating reactive airway disease.  Pulmonary function tests are  significant for obstructive lung disease, without bronchodilator effect (although, based on his symptoms and response to ics/Laba inhaled treatment, it is very likely that he has underlying reactive airway disease/asthma)    Plan  Continue with ics/Laba combination treatment, counseled on how to use this appropriately including rinsing mouth and need to cut this back to p.r.n. if symptoms are well-controlled  Follow up in 6 months  Counseled to call the clinic or go to the emergency department/call 911 in the event of worsening symptoms or any other red flag signs/symptoms as explained to the patient in detail      Follow-up   Follow-up in 6 months' time    Reji Yeh MD  Interventional Pulmonary and Critical Care  [facility]      Problem List Items Addressed This Visit    None            Past Medical History:   Diagnosis Date    Atrial fibrillation     Coronary artery disease involving native coronary artery of native heart without angina pectoris     Gout     Hyperlipidemia     Hypertension     Ischemic cardiomyopathy     NSTEMI (non-ST elevated myocardial infarction) 10/2019      Past Surgical History:   Procedure Laterality Date    APPENDECTOMY      BASAL CELL CARCINOMA EXCISION      BIOPSY WITH TRANSRECTAL ULTRASOUND (TRUS) GUIDANCE      CARDIAC CATHETERIZATION      LAPAROSCOPIC CHOLECYSTECTOMY N/A 01/04/2023    Procedure: CHOLECYSTECTOMY, LAPAROSCOPIC;  Surgeon: Pablito Feliciano MD;  Location: [facility];  Service: General;  Laterality: N/A;    SQUAMOUS CELL CARCINOMA EXCISION       Family History   Problem Relation Name Age of Onset    Heart attack Mother      Colon cancer Father      Cancer Father       Review of patient's allergies indicates:   Allergen Reactions    Pcn [penicillins]       Social History     Tobacco Use    Smoking status: Never     Passive exposure: Never    Smokeless tobacco: Never   Substance Use Topics    Alcohol use: Never    Drug use: Never      Review of Systems      "  Objective:      Physical Exam  Constitutional:       General: He is not in acute distress.     Appearance: Normal appearance. He is normal weight. He is not ill-appearing or diaphoretic.   HENT:      Head: Normocephalic and atraumatic.      Nose: No congestion or rhinorrhea.      Mouth/Throat:      Mouth: Mucous membranes are moist.   Cardiovascular:      Rate and Rhythm: Normal rate and regular rhythm.      Pulses: Normal pulses.      Heart sounds: Normal heart sounds.   Pulmonary:      Effort: Pulmonary effort is normal. No respiratory distress.      Breath sounds: No stridor. No wheezing, rhonchi or rales.   Chest:      Chest wall: No tenderness.   Abdominal:      General: Abdomen is flat.   Musculoskeletal:      Cervical back: Normal range of motion. No rigidity.      Right lower leg: No edema.      Left lower leg: No edema.   Skin:     General: Skin is warm.      Findings: No erythema.   Neurological:      General: No focal deficit present.      Mental Status: He is alert and oriented to person, place, and time. Mental status is at baseline.   Psychiatric:         Mood and Affect: Mood normal.         Behavior: Behavior normal.         Thought Content: Thought content normal.                9/3/2024     1:40 PM 8/28/2024     4:08 PM 8/15/2024     8:00 AM 7/31/2024     3:49 PM 6/4/2024     3:15 PM 5/31/2024     8:30 AM 5/31/2024     8:00 AM   Pulmonary Function Tests   FVC      2.89 liters    FVC%      71    FEV1      1.96 liters    FEV1%      65    FEF 25-75      1.16    FEF 25-75%      54    TLC (liters)      5.81 liters    TLC%      80    RV      2.92    RV%      109    DLCO (ml/mmHg sec)      17.37 ml/mmHg sec    DLCO%      69    Peak Flow      271 L/min    FiO2 (%)      21 %    SpO2     99 % 98 %    Ordering Provider       LILIANA WISE MD   Performing nurse/tech/RT       KATHERINE CINTRON CRT   Diagnosis       Shortness of Breath   Height 6' (1.829 m) 6' (1.829 m) 6' (1.829 m) 5' 11" (1.803 m) 5' 11" (1.803 " m)  6' (1.829 m)   Weight 81.6 kg (180 lb) 81.6 kg (180 lb) 81.6 kg (180 lb) 88.9 kg (196 lb) 81.2 kg (179 lb)  81.2 kg (179 lb)   BMI (Calculated) 24.4 24.4 24.4 27.3 25  24.3   Patient Race          6MWT Status       completed without stopping   Patient Reported       No complaints   Was O2 used?       No   6MW Distance walked (feet)       910 feet   Distance walked (meters)       277.37 meters   Did patient stop?       No   Type of assistive device(s) used?       no assistive devices   Oxygen Saturation       99 %   Supplemental Oxygen       1 L/M   Heart Rate       90 bpm   Blood Pressure       144/90   Radha Dyspnea Rating        light   Oxygen Saturation       99 %   Supplemental Oxygen       Room Air   Heart Rate       113 bpm   Blood Pressure       155/92   Radha Dyspnea Rating        somewhat heavy   Recovery Time (seconds)       60 seconds   Oxygen Saturation       100 %   Supplemental Oxygen       Room Air   Heart Rate       95 bpm   Blood Pressure       145/90   Radha Dyspnea Rating        very light         Outpatient Encounter Medications as of 6/4/2024   Medication Sig Dispense Refill    albuterol (VENTOLIN HFA) 90 mcg/actuation inhaler Inhale 2 puffs into the lungs every 6 (six) hours as needed for Wheezing. Rescue 18 g 2    amiodarone (PACERONE) 200 MG Tab Take 200 mg by mouth.      aspirin (ECOTRIN) 81 MG EC tablet Take 81 mg by mouth once daily.      cholecalciferol, vitamin D3, (VITAMIN D3) 125 mcg (5,000 unit) Tab Take 5,000 Units by mouth once daily.      clopidogreL (PLAVIX) 75 mg tablet Take 1 tablet by mouth once daily.      cyanocobalamin 1,000 mcg/mL injection INJECT 1 ML INTRAMUSCULARLY ONCE monthly 1 mL 4    fluticasone propion-salmeteroL 55-14 mcg/actuation AePB Inhale 1 puff into the lungs 2 (two) times a day. 1 each 11    metoprolol succinate (TOPROL-XL) 25 MG 24 hr tablet       multivitamin (ONE DAILY MULTIVITAMIN) per tablet Take 1 tablet by mouth once daily.      mupirocin  (BACTROBAN) 2 % ointment Apply topically once daily. 22 g 5    pantoprazole (PROTONIX) 40 MG tablet Take 1 tablet (40 mg total) by mouth once daily. 90 tablet 3    [DISCONTINUED] allopurinoL (ZYLOPRIM) 300 MG tablet Take 1 tablet (300 mg total) by mouth once daily. 90 tablet 6    [DISCONTINUED] diltiaZEM (CARDIZEM CD) 240 MG 24 hr capsule Take 1 capsule (240 mg total) by mouth once daily. 90 capsule 3    apixaban (ELIQUIS) 5 mg Tab Take 1 tablet (5 mg total) by mouth 2 (two) times daily. 60 tablet 5    ezetimibe (ZETIA) 10 mg tablet Take 1 tablet (10 mg total) by mouth once daily. 90 tablet 1    HYDROcodone-acetaminophen (NORCO) 5-325 mg per tablet Take 1 tablet by mouth every 6 (six) hours as needed for Pain. 15 tablet 0    rosuvastatin (CRESTOR) 5 MG tablet Take 1 tablet (5 mg total) by mouth once daily. 90 tablet 1     No facility-administered encounter medications on file as of 6/4/2024.       Assessment & Plan    As above                                          No orders of the defined types were placed in this encounter.

## 2024-09-08 NOTE — PROGRESS NOTES
Patient Name: Gris Hobbs   Primary Care Provider: Yobany Marsh MD   Date of Service:  5/3/24  Reason for Referral:  Subacute cough    Chief Complaint: Cough (Persistent Chronic Cough with treatment and still has not improved. Have trouble producing sputum during spells. //)      Subjective:      Gris Hobbs is 78 y.o. male with Past medical history significant for subacute cough who presents today for evaluation of the same cough.      Initial clinic visit 5/3/24   The patient states that he has had this cough for a couple of months, has undergone 3 complete rounds of antibiotics without any significant improvement.  He said that it is occasionally productive, worse at night.  He also endorses significant amount of wheezing that is present.  Denies any significant fevers recently.  I reviewed his imaging myself (chest x-ray from 03/1924) with no evidence of pneumothorax, pleural effusion or any acute process.  He did have a CT abdomen pelvis back in September of 2022 which again showed no evidence of infection/consolidation in the lower lobes of the lungs.  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  On review of his CBC from 3/25/24, no evidence of significant leukocytosis, stable hemoglobin at 13.7.        Assessment and Plan      Subacute cough   Occasional dyspnea on exertion   Wheezing, suggestive of reactive airway disease      Assessment:  Based on the patient's symptoms, he appears to have a possible noninfectious cause for his subacute cough.  Reactive airway disease is high in the differential, given the patient's wheezing and worsening of symptoms at night.  No significant GERD reported today, he is already on pantoprazole.    Plan  He warrants a CT chest, as bronchiectasis is also in the differential.    Ordered:Complete PFTs with pre and post bronchodilator testing and 6 minute walk test (pulmonary stress test)    Prescribed ics/Laba inhaler help with the  patient's symptoms  Counseled to call the clinic or go to the emergency department/call 911 in the event of worsening symptoms or any other red flag signs/symptoms as explained to the patient in detail      Follow-up   Follow-up  after testing as above    Reji Yeh MD  Interventional Pulmonary and Critical Care  [facility]      Problem List Items Addressed This Visit    None  Visit Diagnoses       Subacute cough    -  Primary    Relevant Orders    CT Chest Without Contrast (Completed)    Complete PFT w/ bronchodilator (Completed)    Stress test, pulmonary (Completed)                Past Medical History:   Diagnosis Date    Atrial fibrillation     Coronary artery disease involving native coronary artery of native heart without angina pectoris     Gout     Hyperlipidemia     Hypertension     Ischemic cardiomyopathy     NSTEMI (non-ST elevated myocardial infarction) 10/2019      Past Surgical History:   Procedure Laterality Date    APPENDECTOMY      BASAL CELL CARCINOMA EXCISION      BIOPSY WITH TRANSRECTAL ULTRASOUND (TRUS) GUIDANCE      CARDIAC CATHETERIZATION      LAPAROSCOPIC CHOLECYSTECTOMY N/A 01/04/2023    Procedure: CHOLECYSTECTOMY, LAPAROSCOPIC;  Surgeon: Pablito Feliciano MD;  Location: [facility];  Service: General;  Laterality: N/A;    SQUAMOUS CELL CARCINOMA EXCISION       Family History   Problem Relation Name Age of Onset    Heart attack Mother      Colon cancer Father      Cancer Father       Review of patient's allergies indicates:   Allergen Reactions    Pcn [penicillins]       Social History     Tobacco Use    Smoking status: Never     Passive exposure: Never    Smokeless tobacco: Never   Substance Use Topics    Alcohol use: Never    Drug use: Never      Review of Systems       Objective:      Physical Exam  Constitutional:       General: He is not in acute distress.     Appearance: Normal appearance. He is normal weight. He is not ill-appearing or diaphoretic.   HENT:      Head:  "Normocephalic and atraumatic.      Nose: No congestion or rhinorrhea.      Mouth/Throat:      Mouth: Mucous membranes are moist.   Cardiovascular:      Rate and Rhythm: Normal rate and regular rhythm.      Pulses: Normal pulses.      Heart sounds: Normal heart sounds.   Pulmonary:      Effort: Pulmonary effort is normal. No respiratory distress.      Breath sounds: No stridor. Wheezing present. No rhonchi or rales.      Comments: Wheezing present on forced exhalation  Chest:      Chest wall: No tenderness.   Abdominal:      General: Abdomen is flat.   Musculoskeletal:      Cervical back: Normal range of motion. No rigidity.      Right lower leg: No edema.      Left lower leg: No edema.   Skin:     General: Skin is warm.      Findings: No erythema.   Neurological:      General: No focal deficit present.      Mental Status: He is alert and oriented to person, place, and time. Mental status is at baseline.   Psychiatric:         Mood and Affect: Mood normal.         Behavior: Behavior normal.         Thought Content: Thought content normal.                9/3/2024     1:40 PM 8/28/2024     4:08 PM 8/15/2024     8:00 AM 7/31/2024     3:49 PM 6/4/2024     3:15 PM 5/31/2024     8:30 AM 5/31/2024     8:00 AM   Pulmonary Function Tests   FVC      2.89 liters    FVC%      71    FEV1      1.96 liters    FEV1%      65    FEF 25-75      1.16    FEF 25-75%      54    TLC (liters)      5.81 liters    TLC%      80    RV      2.92    RV%      109    DLCO (ml/mmHg sec)      17.37 ml/mmHg sec    DLCO%      69    Peak Flow      271 L/min    FiO2 (%)      21 %    SpO2     99 % 98 %    Ordering Provider       LILIANA WSIE MD   Performing nurse/tech/RT       KATHERINE CINTRON CRT   Diagnosis       Shortness of Breath   Height 6' (1.829 m) 6' (1.829 m) 6' (1.829 m) 5' 11" (1.803 m) 5' 11" (1.803 m)  6' (1.829 m)   Weight 81.6 kg (180 lb) 81.6 kg (180 lb) 81.6 kg (180 lb) 88.9 kg (196 lb) 81.2 kg (179 lb)  81.2 kg (179 lb)   BMI (Calculated) " 24.4 24.4 24.4 27.3 25  24.3   Patient Race          6MWT Status       completed without stopping   Patient Reported       No complaints   Was O2 used?       No   6MW Distance walked (feet)       910 feet   Distance walked (meters)       277.37 meters   Did patient stop?       No   Type of assistive device(s) used?       no assistive devices   Oxygen Saturation       99 %   Supplemental Oxygen       1 L/M   Heart Rate       90 bpm   Blood Pressure       144/90   Radha Dyspnea Rating        light   Oxygen Saturation       99 %   Supplemental Oxygen       Room Air   Heart Rate       113 bpm   Blood Pressure       155/92   Radha Dyspnea Rating        somewhat heavy   Recovery Time (seconds)       60 seconds   Oxygen Saturation       100 %   Supplemental Oxygen       Room Air   Heart Rate       95 bpm   Blood Pressure       145/90   Radha Dyspnea Rating        very light         Outpatient Encounter Medications as of 5/3/2024   Medication Sig Dispense Refill    albuterol (VENTOLIN HFA) 90 mcg/actuation inhaler Inhale 2 puffs into the lungs every 6 (six) hours as needed for Wheezing. Rescue 18 g 2    amiodarone (PACERONE) 200 MG Tab Take 200 mg by mouth.      aspirin (ECOTRIN) 81 MG EC tablet Take 81 mg by mouth once daily.      cholecalciferol, vitamin D3, (VITAMIN D3) 125 mcg (5,000 unit) Tab Take 5,000 Units by mouth once daily.      clopidogreL (PLAVIX) 75 mg tablet Take 1 tablet by mouth once daily.      cyanocobalamin 1,000 mcg/mL injection INJECT 1 ML INTRAMUSCULARLY ONCE monthly 1 mL 4    ezetimibe (ZETIA) 10 mg tablet Take 1 tablet (10 mg total) by mouth once daily. 90 tablet 1    HYDROcodone-acetaminophen (NORCO) 5-325 mg per tablet Take 1 tablet by mouth every 6 (six) hours as needed for Pain. 15 tablet 0    metoprolol succinate (TOPROL-XL) 25 MG 24 hr tablet       multivitamin (ONE DAILY MULTIVITAMIN) per tablet Take 1 tablet by mouth once daily.      mupirocin (BACTROBAN) 2 % ointment Apply topically  once daily. 22 g 5    pantoprazole (PROTONIX) 40 MG tablet Take 1 tablet (40 mg total) by mouth once daily. 90 tablet 3    rosuvastatin (CRESTOR) 5 MG tablet Take 1 tablet (5 mg total) by mouth once daily. 90 tablet 1    [DISCONTINUED] allopurinoL (ZYLOPRIM) 300 MG tablet Take 1 tablet (300 mg total) by mouth once daily. 90 tablet 6    [DISCONTINUED] diltiaZEM (CARDIZEM CD) 240 MG 24 hr capsule Take 1 capsule (240 mg total) by mouth once daily. 90 capsule 3    apixaban (ELIQUIS) 5 mg Tab Take 1 tablet (5 mg total) by mouth 2 (two) times daily. 60 tablet 5    fluticasone propion-salmeteroL 55-14 mcg/actuation AePB Inhale 1 puff into the lungs 2 (two) times a day. 1 each 11    [] levoFLOXacin (LEVAQUIN) 500 MG tablet Take 1 tablet (500 mg total) by mouth once daily. for 10 days 10 tablet 0    [DISCONTINUED] metoprolol succinate (TOPROL-XL) 50 MG 24 hr tablet Take 1 tablet (50 mg total) by mouth once daily. 90 tablet 1     No facility-administered encounter medications on file as of 5/3/2024.       Assessment & Plan    As above                                          Orders Placed This Encounter   Procedures    CT Chest Without Contrast     Standing Status:   Future     Number of Occurrences:   1     Standing Expiration Date:   5/3/2025     Order Specific Question:   May the Radiologist modify the order per protocol to meet the clinical needs of the patient?     Answer:   Yes    Complete PFT w/ bronchodilator     Standing Status:   Future     Number of Occurrences:   1     Standing Expiration Date:   5/3/2025     Order Specific Question:   Release to patient     Answer:   Immediate    Stress test, pulmonary     Standing Status:   Future     Number of Occurrences:   1     Standing Expiration Date:   5/3/2025     Order Specific Question:   Reason for study     Answer:   Oxygen prescription

## 2024-09-10 ENCOUNTER — OFFICE VISIT (OUTPATIENT)
Dept: DERMATOLOGY | Facility: CLINIC | Age: 79
End: 2024-09-10
Payer: MEDICARE

## 2024-09-10 DIAGNOSIS — D48.9 NEOPLASM OF UNCERTAIN BEHAVIOR: Primary | ICD-10-CM

## 2024-09-10 DIAGNOSIS — L82.1 SEBORRHEIC KERATOSES: ICD-10-CM

## 2024-09-10 DIAGNOSIS — L57.0 ACTINIC KERATOSES: ICD-10-CM

## 2024-09-10 DIAGNOSIS — Z85.828 HISTORY OF NONMELANOMA SKIN CANCER: ICD-10-CM

## 2024-09-10 DIAGNOSIS — E53.8 B12 DEFICIENCY: ICD-10-CM

## 2024-09-10 PROCEDURE — 88342 IMHCHEM/IMCYTCHM 1ST ANTB: CPT | Mod: 26,,, | Performed by: PATHOLOGY

## 2024-09-10 PROCEDURE — 88305 TISSUE EXAM BY PATHOLOGIST: CPT | Mod: 26,,, | Performed by: PATHOLOGY

## 2024-09-10 PROCEDURE — 88305 TISSUE EXAM BY PATHOLOGIST: CPT | Mod: TC,SUR | Performed by: STUDENT IN AN ORGANIZED HEALTH CARE EDUCATION/TRAINING PROGRAM

## 2024-09-10 PROCEDURE — 88342 IMHCHEM/IMCYTCHM 1ST ANTB: CPT | Mod: TC,SUR | Performed by: STUDENT IN AN ORGANIZED HEALTH CARE EDUCATION/TRAINING PROGRAM

## 2024-09-10 RX ORDER — CYANOCOBALAMIN 1000 UG/ML
1000 INJECTION, SOLUTION INTRAMUSCULAR; SUBCUTANEOUS
Qty: 1 ML | Refills: 4 | Status: SHIPPED | OUTPATIENT
Start: 2024-09-10

## 2024-09-10 NOTE — PROGRESS NOTES
[facility]  Thomas Lubin MD    [address] [address]  [phone]    Fax: [phone]    Patient Name: Gris Hobbs  Medical Record Number: [id number]  PCP: Yobany Marsh MD  Age: 78 y.o. : 1945  Contact: [phone] (home)     History of Present Illness:     Gris Hobbs is a 78 y.o.  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 Today,  he is concerned about skin lesion on the posterior neck.    The patient has no other concerns today.    Review of Systems:     Unremarkable other than mentioned above.     Physical Exam:     General: Relaxed, oriented, alert    Skin examination of the scalp, face, neck, chest, back, abdomen, upper extremities and lower extremities were normal except for as listed below            Assessment and Plan:     1. History of NMSC   Well-healed scar on right radial forearm   No e/o recurrence   Recommend sunscreen and good photoprotection       2. Actinic Keratoses  Erythematous, scaly papules on posterior neck, scalp , right cheek, left cheek, left arm, forehead, temple    Plan: Liquid Nitrogen.  A total of 15 lesions were treated with liquid nitrogen for 2 freeze-thaw cycles lasting 5 seconds, located on the above locations.   The patient's consent was obtained including but not limited to risks of crusting, scabbing,  blistering, scarring, darker or lighter pigmentary change, recurrence, incomplete removal and infection.    Counseling.  Sun protective clothing and broad spectrum sunscreen can prevent the formation of AK.   AKs can be treated with cryotherapy, photodynamic therapy, imiquimod, topical 5-FU.  Actinic Keratoses are precancerous proliferations that occur within sun damaged skin. If untreated,  a small subset of AKs can develop into Squamous Cell Carcinoma.      3. Seborrheic keratoses   - brown stuck  on appearing papules/plaques  - patient educated on benign nature. No treatment necessary unless they become irritated or inflamed     4. Neoplasm of Uncertain Behavior x 3  A.)- irregular brown macule located on the right mid forearm   Ddx includes: lentigo vs SK vs melanoma       B) scaly plaque located on the frontal scalp  Ddx includes: SCC vs AK    C) scaly plaque located on the right proximal forearm   Ddx includes: SCC vs Ak       Shave biopsy  x 3     Pre-procedure Diagnosis: as above  Post_procedure Diagnosis: same  Estimated Blood Loss: <1cc    Findings: None  Complications: None  Specimens: to pathology      Written informed consent was obtained after discussing risks including pain, bleeding, infection, recurrence and scarring. The biopsy site was sterilely prepped with alcohol, which was allowed to dry completely, then locally infiltrated with 1% lidocaine with epinephrine, ~3 cc total. A shave biopsy was obtained using a Dermablade/15 and the specimen was sent to dermatopathology. Aluminum chloride was used for hemostasis. Antibiotic ointment and a clean dressing were applied. The patient tolerated the procedure well without complications. Verbal and written wound care instructions were given.    Thomas Lubin MD         Return to clinic in 4 months for FSE    AVS printed with patient instructions     Thomas Lubin MD   Mohs Surgery/Dermatologic Oncology  Dermatology

## 2024-09-12 LAB
DHEA SERPL-MCNC: NORMAL
ESTROGEN SERPL-MCNC: NORMAL PG/ML
INSULIN SERPL-ACNC: NORMAL U[IU]/ML
LAB AP GROSS DESCRIPTION: NORMAL
LAB AP LABORATORY NOTES: NORMAL
LAB AP SPEC A DDX: NORMAL
LAB AP SPEC A MORPHOLOGY: NORMAL
LAB AP SPEC A PROCEDURE: NORMAL
LAB AP SPEC B DDX: NORMAL
LAB AP SPEC B MORPHOLOGY: NORMAL
LAB AP SPEC B PROCEDURE: NORMAL
LAB AP SPEC C DDX: NORMAL
LAB AP SPEC C MORPHOLOGY: NORMAL
LAB AP SPEC C PROCEDURE: NORMAL
T3RU NFR SERPL: NORMAL %

## 2024-10-15 ENCOUNTER — OFFICE VISIT (OUTPATIENT)
Dept: OTOLARYNGOLOGY | Facility: CLINIC | Age: 79
End: 2024-10-15
Payer: MEDICARE

## 2024-10-15 ENCOUNTER — OFFICE VISIT (OUTPATIENT)
Dept: PULMONOLOGY | Facility: CLINIC | Age: 79
End: 2024-10-15
Payer: MEDICARE

## 2024-10-15 VITALS
BODY MASS INDEX: 24.38 KG/M2 | DIASTOLIC BLOOD PRESSURE: 80 MMHG | SYSTOLIC BLOOD PRESSURE: 122 MMHG | WEIGHT: 180 LBS | OXYGEN SATURATION: 98 % | RESPIRATION RATE: 18 BRPM | HEIGHT: 72 IN | HEART RATE: 72 BPM

## 2024-10-15 VITALS — BODY MASS INDEX: 24.38 KG/M2 | WEIGHT: 180 LBS | HEIGHT: 72 IN

## 2024-10-15 DIAGNOSIS — R09.82 POSTNASAL DISCHARGE: ICD-10-CM

## 2024-10-15 DIAGNOSIS — J45.20 MILD INTERMITTENT ASTHMA WITHOUT COMPLICATION: Primary | ICD-10-CM

## 2024-10-15 DIAGNOSIS — J32.9 CHRONIC SINUSITIS, UNSPECIFIED LOCATION: ICD-10-CM

## 2024-10-15 DIAGNOSIS — R09.81 NASAL CONGESTION: Primary | ICD-10-CM

## 2024-10-15 DIAGNOSIS — R05.2 SUBACUTE COUGH: ICD-10-CM

## 2024-10-15 DIAGNOSIS — R06.02 SHORTNESS OF BREATH: ICD-10-CM

## 2024-10-15 PROCEDURE — 99214 OFFICE O/P EST MOD 30 MIN: CPT | Mod: S$PBB,,, | Performed by: OTOLARYNGOLOGY

## 2024-10-15 PROCEDURE — 99214 OFFICE O/P EST MOD 30 MIN: CPT | Mod: PBBFAC | Performed by: OTOLARYNGOLOGY

## 2024-10-15 PROCEDURE — 99215 OFFICE O/P EST HI 40 MIN: CPT | Mod: PBBFAC | Performed by: STUDENT IN AN ORGANIZED HEALTH CARE EDUCATION/TRAINING PROGRAM

## 2024-10-15 PROCEDURE — 99999 PR PBB SHADOW E&M-EST. PATIENT-LVL V: CPT | Mod: PBBFAC,,, | Performed by: STUDENT IN AN ORGANIZED HEALTH CARE EDUCATION/TRAINING PROGRAM

## 2024-10-15 PROCEDURE — 99999 PR PBB SHADOW E&M-EST. PATIENT-LVL IV: CPT | Mod: PBBFAC,,, | Performed by: OTOLARYNGOLOGY

## 2024-10-15 PROCEDURE — 99214 OFFICE O/P EST MOD 30 MIN: CPT | Mod: S$PBB,,, | Performed by: STUDENT IN AN ORGANIZED HEALTH CARE EDUCATION/TRAINING PROGRAM

## 2024-10-15 RX ORDER — IPRATROPIUM BROMIDE 42 UG/1
2 SPRAY, METERED NASAL 4 TIMES DAILY
Qty: 15 ML | Refills: 0 | Status: SHIPPED | OUTPATIENT
Start: 2024-10-15

## 2024-10-15 NOTE — PROGRESS NOTES
Patient Name: Gris Hobbs   Primary Care Provider: Yobany Marsh MD   Date of Service:  6/4/24  Reason for Referral:  Subacute cough    Chief Complaint: No chief complaint on file.      Subjective:      Gris Hobbs is 78 y.o. male with Past medical history significant for subacute cough who presents today for evaluation of the same cough.        Follow up clinic visit 10/15/24   Patient had a CT scan of the sinuses done on 9/3/24 which I reviewed personally there was evidence of deviated septum with some evidence of chronic sinusitis with evidence of mild fluid present and has been followed by ENT.  He said he has had no wheezing but has had some postnasal discharge which is causing him to have an occasional cough.  No fevers, wheezing or worsening shortness of breath noted.      Follow up clinic visit 6/4/24  Significant improvement in the patient's cough, status post initiation of ics/Laba inhaled treatment.  Reviewed his pulmonary function tests from 5/31/24 which showed no evidence of requiring oxygen with rest or ambulation.  Mild ventilatory impairment/obstructive lung disease with very mildly decreased DLCO.  I also personally reviewed his CT chest without contrast which showed evidence of calcified granulomas bilaterally.  No significant large masses, effusion seen.      Initial clinic visit 5/3/24   The patient states that he has had this cough for a couple of months, has undergone 3 complete rounds of antibiotics without any significant improvement.  He said that it is occasionally productive, worse at night.  He also endorses significant amount of wheezing that is present.  Denies any significant fevers recently.  I reviewed his imaging myself (chest x-ray from 03/1924) with no evidence of pneumothorax, pleural effusion or any acute process.  He did have a CT abdomen pelvis back in September of 2022 which again showed no evidence of infection/consolidation in the lower lobes of the  lungs.  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  On review of his CBC from 3/25/24, no evidence of significant leukocytosis, stable hemoglobin at 13.7.        Assessment and Plan      Subacute cough , improved  Occasional dyspnea on exertion   Postnasal discharge   Acute on chronic sinusitis  Obstructive lung disease with wheezing, improved      Assessment:  Significant improvement status post initiation of ics/Lama indicating reactive airway disease.  Pulmonary function tests are significant for obstructive lung disease, without bronchodilator effect (although, based on his symptoms and response to ics/Laba inhaled treatment, it is very likely that he has underlying reactive airway disease/asthma).  In addition today he has symptoms consistent with postnasal discharge in the setting of possible acute on chronic sinusitis, following with ENT.    Plan  Continue with ics/Laba combination treatment, counseled on how to use this appropriately including rinsing mouth and need to cut this back to p.r.n. if symptoms are well-controlled  Prescribed nasal ipratropium to help with postnasal discharge and counseled to use his p.r.n. once his symptoms have improved  Follow up in 6 months  Counseled to call the clinic or go to the emergency department/call 911 in the event of worsening symptoms or any other red flag signs/symptoms as explained to the patient in detail        Follow-up   Follow-up in 6 months' time    Reji Yeh MD  Interventional Pulmonary and Critical Care  [facility]      Problem List Items Addressed This Visit    None            Past Medical History:   Diagnosis Date    Atrial fibrillation     Coronary artery disease involving native coronary artery of native heart without angina pectoris     Gout     Hyperlipidemia     Hypertension     Ischemic cardiomyopathy     NSTEMI (non-ST elevated myocardial infarction) 10/2019      Past Surgical History:   Procedure  Laterality Date    APPENDECTOMY      BASAL CELL CARCINOMA EXCISION      BIOPSY WITH TRANSRECTAL ULTRASOUND (TRUS) GUIDANCE      CARDIAC CATHETERIZATION      LAPAROSCOPIC CHOLECYSTECTOMY N/A 01/04/2023    Procedure: CHOLECYSTECTOMY, LAPAROSCOPIC;  Surgeon: Pablito Feliciano MD;  Location: [facility];  Service: General;  Laterality: N/A;    SQUAMOUS CELL CARCINOMA EXCISION       Family History   Problem Relation Name Age of Onset    Heart attack Mother      Colon cancer Father      Cancer Father       Review of patient's allergies indicates:   Allergen Reactions    Pcn [penicillins]       Social History     Tobacco Use    Smoking status: Never     Passive exposure: Never    Smokeless tobacco: Never   Substance Use Topics    Alcohol use: Never    Drug use: Never      Review of Systems       Objective:      Physical Exam  Constitutional:       General: He is not in acute distress.     Appearance: Normal appearance. He is normal weight. He is not ill-appearing or diaphoretic.   HENT:      Head: Normocephalic and atraumatic.      Nose: No congestion or rhinorrhea.      Mouth/Throat:      Mouth: Mucous membranes are moist.   Cardiovascular:      Rate and Rhythm: Normal rate and regular rhythm.      Pulses: Normal pulses.      Heart sounds: Normal heart sounds.   Pulmonary:      Effort: Pulmonary effort is normal. No respiratory distress.      Breath sounds: No stridor. No wheezing, rhonchi or rales.   Chest:      Chest wall: No tenderness.   Abdominal:      General: Abdomen is flat.   Musculoskeletal:      Cervical back: Normal range of motion. No rigidity.      Right lower leg: No edema.      Left lower leg: No edema.   Skin:     General: Skin is warm.      Findings: No erythema.   Neurological:      General: No focal deficit present.      Mental Status: He is alert and oriented to person, place, and time. Mental status is at baseline.   Psychiatric:         Mood and Affect: Mood normal.         Behavior: Behavior normal.    "      Thought Content: Thought content normal.                9/3/2024     1:40 PM 8/28/2024     4:08 PM 8/15/2024     8:00 AM 7/31/2024     3:49 PM 6/4/2024     3:15 PM 5/31/2024     8:30 AM 5/31/2024     8:00 AM   Pulmonary Function Tests   FVC      2.89 liters    FVC%      71    FEV1      1.96 liters    FEV1%      65    FEF 25-75      1.16    FEF 25-75%      54    TLC (liters)      5.81 liters    TLC%      80    RV      2.92    RV%      109    DLCO (ml/mmHg sec)      17.37 ml/mmHg sec    DLCO%      69    Peak Flow      271 L/min    FiO2 (%)      21 %    SpO2     99 % 98 %    Ordering Provider       LILIANA WISE MD   Performing nurse/tech/RT       KATHERINE CINTRON CRT   Diagnosis       Shortness of Breath   Height 6' (1.829 m) 6' (1.829 m) 6' (1.829 m) 5' 11" (1.803 m) 5' 11" (1.803 m)  6' (1.829 m)   Weight 81.6 kg (180 lb) 81.6 kg (180 lb) 81.6 kg (180 lb) 88.9 kg (196 lb) 81.2 kg (179 lb)  81.2 kg (179 lb)   BMI (Calculated) 24.4 24.4 24.4 27.3 25  24.3   Patient Race          6MWT Status       completed without stopping   Patient Reported       No complaints   Was O2 used?       No   6MW Distance walked (feet)       910 feet   Distance walked (meters)       277.37 meters   Did patient stop?       No   Type of assistive device(s) used?       no assistive devices   Oxygen Saturation       99 %   Supplemental Oxygen       1 L/M   Heart Rate       90 bpm   Blood Pressure       144/90   Radha Dyspnea Rating        light   Oxygen Saturation       99 %   Supplemental Oxygen       Room Air   Heart Rate       113 bpm   Blood Pressure       155/92   Radha Dyspnea Rating        somewhat heavy   Recovery Time (seconds)       60 seconds   Oxygen Saturation       100 %   Supplemental Oxygen       Room Air   Heart Rate       95 bpm   Blood Pressure       145/90   Radha Dyspnea Rating        very light         Outpatient Encounter Medications as of 10/15/2024   Medication Sig Dispense Refill    albuterol (VENTOLIN HFA) 90 " mcg/actuation inhaler Inhale 2 puffs into the lungs every 6 (six) hours as needed for Wheezing. Rescue 18 g 2    allopurinoL (ZYLOPRIM) 300 MG tablet Take 1 tablet by mouth once daily 90 tablet 0    amiodarone (PACERONE) 200 MG Tab Take 200 mg by mouth.      apixaban (ELIQUIS) 5 mg Tab Take 1 tablet (5 mg total) by mouth 2 (two) times daily. 60 tablet 5    aspirin (ECOTRIN) 81 MG EC tablet Take 81 mg by mouth once daily.      azelastine (ASTELIN) 137 mcg (0.1 %) nasal spray 1 spray (137 mcg total) by Nasal route 2 (two) times daily. 30 mL 2    cholecalciferol, vitamin D3, (VITAMIN D3) 125 mcg (5,000 unit) Tab Take 5,000 Units by mouth once daily.      clindamycin (CLEOCIN) 300 MG capsule Take 1 capsule (300 mg total) by mouth 2 (two) times a day. 28 capsule 0    clopidogreL (PLAVIX) 75 mg tablet Take 1 tablet by mouth once daily.      cyanocobalamin 1,000 mcg/mL injection Inject 1 mL (1,000 mcg total) into the muscle every 30 days. 1 mL 4    diltiaZEM (CARDIZEM CD) 240 MG 24 hr capsule Take 1 capsule (240 mg total) by mouth once daily. 90 capsule 3    ezetimibe (ZETIA) 10 mg tablet Take 1 tablet (10 mg total) by mouth once daily. 90 tablet 1    fluticasone propion-salmeteroL 55-14 mcg/actuation AePB Inhale 1 puff into the lungs 2 (two) times a day. 1 each 11    gabapentin (NEURONTIN) 300 MG capsule Take 300 mg by mouth 3 (three) times daily.      HYDROcodone-acetaminophen (NORCO) 5-325 mg per tablet Take 1 tablet by mouth every 6 (six) hours as needed for Pain. 15 tablet 0    metoprolol succinate (TOPROL-XL) 25 MG 24 hr tablet       multivitamin (ONE DAILY MULTIVITAMIN) per tablet Take 1 tablet by mouth once daily.      mupirocin (BACTROBAN) 2 % ointment Apply topically once daily. 22 g 5    pantoprazole (PROTONIX) 40 MG tablet Take 1 tablet (40 mg total) by mouth once daily. 90 tablet 3    rosuvastatin (CRESTOR) 5 MG tablet Take 1 tablet (5 mg total) by mouth once daily. 90 tablet 1    valACYclovir (VALTREX) 1000  MG tablet Take 1,000 mg by mouth 3 (three) times daily.       No facility-administered encounter medications on file as of 10/15/2024.       Assessment & Plan    As above                                          No orders of the defined types were placed in this encounter.

## 2024-10-15 NOTE — PROGRESS NOTES
Subjective:       Patient ID: Gris Hobbs is a 78 y.o. male.    Chief Complaint: Follow-up (6 week follow up. Patient states he is using nasal spray as directed. He complains of his sinus draining while he eats.)    Follow-up  Associated symptoms include congestion.     Review of Systems   HENT:  Positive for congestion, postnasal drip and rhinorrhea.    All other systems reviewed and are negative.      Objective:      Physical Exam  General: NAD  Head: Normocephalic, atraumatic, no facial asymmetry/normal strength,  Ears: Both auricules normal in appearance, w/o deformities tympanic membranes normal external auditory canals normal  Nose: External nose w/o deformities swollen  turbinates no drainage or inflammation  Oral Cavity: Lips, gums, floor of mouth, tongue hard palate, and buccal mucosa without mass/lesion  Oropharynx: Mucosa pink and moist, soft palate, posterior pharynx and oropharyngeal wall without mass/lesion  Neck: Supple, symmetric, trachea midline, no palpable mass/lesion, no palpable cervical lymphadenopathy  Skin: Warm and dry, no concerning lesions  Respiratory: Respirations even, unlabored  Assessment:       1. Nasal congestion    2. Chronic sinusitis, unspecified location        Plan:       Discussed rhinitis   Will treat with sprays   F/u 3 months

## 2024-10-23 DIAGNOSIS — K21.9 GASTROESOPHAGEAL REFLUX DISEASE, UNSPECIFIED WHETHER ESOPHAGITIS PRESENT: ICD-10-CM

## 2024-10-23 RX ORDER — PANTOPRAZOLE SODIUM 40 MG/1
40 TABLET, DELAYED RELEASE ORAL
Qty: 90 TABLET | Refills: 0 | Status: SHIPPED | OUTPATIENT
Start: 2024-10-23

## 2024-10-28 ENCOUNTER — OFFICE VISIT (OUTPATIENT)
Dept: FAMILY MEDICINE | Facility: CLINIC | Age: 79
End: 2024-10-28
Payer: MEDICARE

## 2024-10-28 VITALS
OXYGEN SATURATION: 100 % | TEMPERATURE: 97 F | RESPIRATION RATE: 16 BRPM | HEART RATE: 91 BPM | HEIGHT: 72 IN | BODY MASS INDEX: 26.41 KG/M2 | SYSTOLIC BLOOD PRESSURE: 120 MMHG | WEIGHT: 195 LBS | DIASTOLIC BLOOD PRESSURE: 78 MMHG

## 2024-10-28 DIAGNOSIS — E53.8 VITAMIN B 12 DEFICIENCY: ICD-10-CM

## 2024-10-28 DIAGNOSIS — J22 LOWER RESPIRATORY INFECTION: Primary | ICD-10-CM

## 2024-10-28 DIAGNOSIS — I10 HYPERTENSION, ESSENTIAL: ICD-10-CM

## 2024-10-28 PROCEDURE — 99214 OFFICE O/P EST MOD 30 MIN: CPT | Mod: ,,, | Performed by: FAMILY MEDICINE

## 2024-10-28 PROCEDURE — 96372 THER/PROPH/DIAG INJ SC/IM: CPT | Mod: ,,, | Performed by: FAMILY MEDICINE

## 2024-10-28 RX ORDER — AZITHROMYCIN 250 MG/1
TABLET, FILM COATED ORAL
Qty: 6 TABLET | Refills: 0 | Status: SHIPPED | OUTPATIENT
Start: 2024-10-28

## 2024-10-28 RX ORDER — DEXAMETHASONE SODIUM PHOSPHATE 4 MG/ML
8 INJECTION, SOLUTION INTRA-ARTICULAR; INTRALESIONAL; INTRAMUSCULAR; INTRAVENOUS; SOFT TISSUE
Status: COMPLETED | OUTPATIENT
Start: 2024-10-28 | End: 2024-10-28

## 2024-10-28 RX ADMIN — DEXAMETHASONE SODIUM PHOSPHATE 8 MG: 4 INJECTION, SOLUTION INTRA-ARTICULAR; INTRALESIONAL; INTRAMUSCULAR; INTRAVENOUS; SOFT TISSUE at 11:10

## 2024-10-30 RX ORDER — ALLOPURINOL 300 MG/1
300 TABLET ORAL
Qty: 90 TABLET | Refills: 0 | Status: SHIPPED | OUTPATIENT
Start: 2024-10-30

## 2024-11-07 ENCOUNTER — TELEPHONE (OUTPATIENT)
Dept: PULMONOLOGY | Facility: CLINIC | Age: 79
End: 2024-11-07
Payer: MEDICARE

## 2024-11-07 ENCOUNTER — HOSPITAL ENCOUNTER (OUTPATIENT)
Dept: RADIOLOGY | Facility: HOSPITAL | Age: 79
Discharge: HOME | End: 2024-11-07
Attending: STUDENT IN AN ORGANIZED HEALTH CARE EDUCATION/TRAINING PROGRAM
Payer: MEDICARE

## 2024-11-07 ENCOUNTER — OFFICE VISIT (OUTPATIENT)
Dept: PULMONOLOGY | Facility: CLINIC | Age: 79
End: 2024-11-07
Payer: MEDICARE

## 2024-11-07 VITALS
RESPIRATION RATE: 16 BRPM | OXYGEN SATURATION: 97 % | HEIGHT: 72 IN | SYSTOLIC BLOOD PRESSURE: 140 MMHG | HEART RATE: 106 BPM | WEIGHT: 194 LBS | BODY MASS INDEX: 26.28 KG/M2 | DIASTOLIC BLOOD PRESSURE: 98 MMHG

## 2024-11-07 DIAGNOSIS — R06.02 SHORTNESS OF BREATH: ICD-10-CM

## 2024-11-07 DIAGNOSIS — R05.2 SUBACUTE COUGH: Primary | ICD-10-CM

## 2024-11-07 DIAGNOSIS — J32.9 CHRONIC SINUSITIS, UNSPECIFIED LOCATION: ICD-10-CM

## 2024-11-07 DIAGNOSIS — J45.20 MILD INTERMITTENT ASTHMA WITHOUT COMPLICATION: ICD-10-CM

## 2024-11-07 DIAGNOSIS — R05.2 SUBACUTE COUGH: ICD-10-CM

## 2024-11-07 DIAGNOSIS — R09.82 POSTNASAL DISCHARGE: ICD-10-CM

## 2024-11-07 PROCEDURE — 99214 OFFICE O/P EST MOD 30 MIN: CPT | Mod: PBBFAC | Performed by: STUDENT IN AN ORGANIZED HEALTH CARE EDUCATION/TRAINING PROGRAM

## 2024-11-07 PROCEDURE — 99214 OFFICE O/P EST MOD 30 MIN: CPT | Mod: S$PBB,,, | Performed by: STUDENT IN AN ORGANIZED HEALTH CARE EDUCATION/TRAINING PROGRAM

## 2024-11-07 PROCEDURE — 71046 X-RAY EXAM CHEST 2 VIEWS: CPT | Mod: TC

## 2024-11-07 PROCEDURE — 99999 PR PBB SHADOW E&M-EST. PATIENT-LVL IV: CPT | Mod: PBBFAC,,, | Performed by: STUDENT IN AN ORGANIZED HEALTH CARE EDUCATION/TRAINING PROGRAM

## 2024-11-07 PROCEDURE — 71046 X-RAY EXAM CHEST 2 VIEWS: CPT | Mod: 26,,, | Performed by: RADIOLOGY

## 2024-11-07 RX ORDER — IPRATROPIUM BROMIDE AND ALBUTEROL SULFATE 2.5; .5 MG/3ML; MG/3ML
3 SOLUTION RESPIRATORY (INHALATION) EVERY 6 HOURS PRN
Qty: 75 ML | Refills: 0 | Status: SHIPPED | OUTPATIENT
Start: 2024-11-07 | End: 2025-11-07

## 2024-11-07 RX ORDER — BUDESONIDE 0.5 MG/2ML
0.5 INHALANT ORAL 2 TIMES DAILY
Qty: 120 ML | Refills: 5 | Status: SHIPPED | OUTPATIENT
Start: 2024-11-07 | End: 2025-11-07

## 2024-11-07 RX ORDER — ACETYLCYSTEINE 200 MG/ML
4 SOLUTION ORAL; RESPIRATORY (INHALATION) 3 TIMES DAILY
Qty: 360 ML | Refills: 0 | Status: SHIPPED | OUTPATIENT
Start: 2024-11-07 | End: 2024-12-07

## 2024-11-07 NOTE — TELEPHONE ENCOUNTER
Spoke to the patient need a nebulizer machine for medication that was sent. RF for neb sent to [facility].

## 2024-11-07 NOTE — TELEPHONE ENCOUNTER
----- Message from Noemy sent at 11/7/2024  3:30 PM CST -----  Pt needs to know where to get nebulizer. He went to  but they dont have that. [phone].   Who Called: Gris Hobbs    Caller is requesting assistance/information from provider's office.     and phone number:         Patient's Preferred Phone Number on File: [phone]   Best Call Back Number, if different:[phone]  Additional Information:

## 2024-11-07 NOTE — TELEPHONE ENCOUNTER
----- Message from Reji Yeh MD sent at 11/7/2024  3:10 PM CST -----  Please call and let him know that his chest x-ray shows no new infiltrate/pneumonia.

## 2024-11-07 NOTE — PATIENT INSTRUCTIONS
Stopp using your inhaler after he received nebulized medication as below.      Use DuoNeb nebulizer 2 times a day (for 10 days)  Use budesonide nebulizer 2 times a day (for 10 days, make sure to rinse your mouth out after using this)      Use Mucomyst nebulizer 3 times a day for 7 days.      After 10 days of using the nebulizer, please go back to using your regular inhaler.    In the case of high fevers, chills, confusion, worsening shortness of breath or worsening cough please come to the emergency department or call the Pulmonary Clinic.

## 2024-11-07 NOTE — PROGRESS NOTES
Patient Name: Gris Hobbs   Primary Care Provider: Yobany Marsh MD   Date of Service:  6/4/24  Reason for Referral:  Subacute cough    Chief Complaint: Asthma (Dry cough. Ongoing since the end of October. Saw Dr Marsh who gave him a steroid injection and z maryjane. )      Subjective:      Gris Hobbs is 78 y.o. male with Past medical history significant for subacute cough who presents today for evaluation of the same cough.      Follow up clinic visit 11/7/24   The patient appears to have had an exacerbation of his underlying lung disease with more of a productive and is rhonchus.  Recently finished a course of steroids and antibiotics as prescribed by his primary care physician.  No chest imaging has been obtained.    Follow up clinic visit 10/15/24   Patient had a CT scan of the sinuses done on 9/3/24 which I reviewed personally there was evidence of deviated septum with some evidence of chronic sinusitis with evidence of mild fluid present and has been followed by ENT.  He said he has had no wheezing but has had some postnasal discharge which is causing him to have an occasional cough.  No fevers, wheezing or worsening shortness of breath noted.      Follow up clinic visit 6/4/24  Significant improvement in the patient's cough, status post initiation of ics/Laba inhaled treatment.  Reviewed his pulmonary function tests from 5/31/24 which showed no evidence of requiring oxygen with rest or ambulation.  Mild ventilatory impairment/obstructive lung disease with very mildly decreased DLCO.  I also personally reviewed his CT chest without contrast which showed evidence of calcified granulomas bilaterally.  No significant large masses, effusion seen.      Initial clinic visit 5/3/24   The patient states that he has had this cough for a couple of months, has undergone 3 complete rounds of antibiotics without any significant improvement.  He said that it is occasionally productive, worse at night.  He  also endorses significant amount of wheezing that is present.  Denies any significant fevers recently.  I reviewed his imaging myself (chest x-ray from 03/1924) with no evidence of pneumothorax, pleural effusion or any acute process.  He did have a CT abdomen pelvis back in September of 2022 which again showed no evidence of infection/consolidation in the lower lobes of the lungs.  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  On review of his CBC from 3/25/24, no evidence of significant leukocytosis, stable hemoglobin at 13.7.        Assessment and Plan      Productive cough, exacerbation  Occasional dyspnea on exertion   Postnasal discharge   Acute on chronic sinusitis  Obstructive lung disease with wheezing      Assessment:  Previously, the patient had a significant improvement status post initiation of ics/Lama indicating reactive airway disease.  Pulmonary function tests are significant for obstructive lung disease, without bronchodilator effect (although, based on his symptoms and response to ics/Laba inhaled treatment, it is very likely that he has underlying reactive airway disease/asthma).      Most recently has had an exacerbation of his underlying lung disease.  At this time, he is on ics/Laba combination inhaler which we will switch out to nebulizer during this period of exacerbation.  He has recently completed a course of steroids, denies any fevers, chills or worsening shortness of breath.      Plan  Hold off on ics/Laba combination treatment while he initiates triple inhaled therapy with DuoNebs for 10 days and Mucomyst therapy for 7 days to help with clearance of secretions   Continues to use his Acapella device   After 10 days of treatment, he will go back to using ics/Laba combination inhaler   Continue nasal ipratropium to help with postnasal discharge and counseled to use his p.r.n. once his symptoms have improved  Follow up in 3 months or sooner.  Counseled to call the  clinic or go to the emergency department/call 911 in the event of worsening symptoms or any other red flag signs/symptoms as explained to the patient in detail        Follow-up   Follow-up in 6 months' time    Reji Yeh MD  Interventional Pulmonary and Critical Care  [facility]      Problem List Items Addressed This Visit    None            Past Medical History:   Diagnosis Date    Atrial fibrillation     Coronary artery disease involving native coronary artery of native heart without angina pectoris     Gout     Hyperlipidemia     Hypertension     Ischemic cardiomyopathy     NSTEMI (non-ST elevated myocardial infarction) 10/2019      Past Surgical History:   Procedure Laterality Date    APPENDECTOMY      BASAL CELL CARCINOMA EXCISION      BIOPSY WITH TRANSRECTAL ULTRASOUND (TRUS) GUIDANCE      CARDIAC CATHETERIZATION      LAPAROSCOPIC CHOLECYSTECTOMY N/A 01/04/2023    Procedure: CHOLECYSTECTOMY, LAPAROSCOPIC;  Surgeon: Pablito Feliciano MD;  Location: [facility] OR;  Service: General;  Laterality: N/A;    SQUAMOUS CELL CARCINOMA EXCISION       Family History   Problem Relation Name Age of Onset    Heart attack Mother      Colon cancer Father      Cancer Father       Review of patient's allergies indicates:   Allergen Reactions    Pcn [penicillins]       Social History     Tobacco Use    Smoking status: Never     Passive exposure: Never    Smokeless tobacco: Never   Substance Use Topics    Alcohol use: Never    Drug use: Never      Review of Systems       Objective:      Physical Exam  Constitutional:       General: He is not in acute distress.     Appearance: Normal appearance. He is normal weight. He is not ill-appearing or diaphoretic.   HENT:      Head: Normocephalic and atraumatic.      Nose: No congestion or rhinorrhea.      Mouth/Throat:      Mouth: Mucous membranes are moist.   Cardiovascular:      Rate and Rhythm: Normal rate and regular rhythm.      Pulses: Normal pulses.      Heart sounds:  Normal heart sounds.   Pulmonary:      Effort: Pulmonary effort is normal. No respiratory distress.      Breath sounds: No stridor. Rhonchi present. No wheezing or rales.      Comments: Rhonchorous breath sounds bilaterally  Chest:      Chest wall: No tenderness.   Abdominal:      General: Abdomen is flat.   Musculoskeletal:      Cervical back: Normal range of motion. No rigidity.      Right lower leg: No edema.      Left lower leg: No edema.   Skin:     General: Skin is warm.      Findings: No erythema.   Neurological:      General: No focal deficit present.      Mental Status: He is alert and oriented to person, place, and time. Mental status is at baseline.   Psychiatric:         Mood and Affect: Mood normal.         Behavior: Behavior normal.         Thought Content: Thought content normal.                11/7/2024     1:09 PM 10/28/2024    11:17 AM 10/15/2024     1:16 PM 10/15/2024    11:07 AM 9/3/2024     1:40 PM 8/28/2024     4:08 PM 8/15/2024     8:00 AM   Pulmonary Function Tests   SpO2 97 % 100 %  98 %      Height 6' (1.829 m) 6' (1.829 m) 6' (1.829 m) 6' (1.829 m) 6' (1.829 m) 6' (1.829 m) 6' (1.829 m)   Weight 88 kg (194 lb 0.1 oz) 88.5 kg (195 lb) 81.6 kg (180 lb) 81.6 kg (180 lb) 81.6 kg (180 lb) 81.6 kg (180 lb) 81.6 kg (180 lb)   BMI (Calculated) 26.3 26.4 24.4 24.4 24.4 24.4 24.4         Outpatient Encounter Medications as of 11/7/2024   Medication Sig Dispense Refill    allopurinoL (ZYLOPRIM) 300 MG tablet Take 1 tablet by mouth once daily 90 tablet 0    aspirin (ECOTRIN) 81 MG EC tablet Take 81 mg by mouth once daily.      azelastine (ASTELIN) 137 mcg (0.1 %) nasal spray 1 spray (137 mcg total) by Nasal route 2 (two) times daily. 30 mL 2    cholecalciferol, vitamin D3, (VITAMIN D3) 125 mcg (5,000 unit) Tab Take 5,000 Units by mouth once daily.      cyanocobalamin 1,000 mcg/mL injection Inject 1 mL (1,000 mcg total) into the muscle every 30 days. 1 mL 4    diltiaZEM (CARDIZEM CD) 240 MG 24 hr  capsule Take 1 capsule (240 mg total) by mouth once daily. 90 capsule 3    fluticasone propion-salmeteroL 55-14 mcg/actuation AePB Inhale 1 puff into the lungs 2 (two) times a day. 1 each 11    gabapentin (NEURONTIN) 300 MG capsule Take 300 mg by mouth 3 (three) times daily.      ipratropium (ATROVENT) 42 mcg (0.06 %) nasal spray 2 sprays by Each Nostril route 4 (four) times daily. 15 mL 0    metoprolol succinate (TOPROL-XL) 25 MG 24 hr tablet       multivitamin (ONE DAILY MULTIVITAMIN) per tablet Take 1 tablet by mouth once daily.      mupirocin (BACTROBAN) 2 % ointment Apply topically once daily. 22 g 5    pantoprazole (PROTONIX) 40 MG tablet Take 1 tablet by mouth once daily 90 tablet 0    albuterol (VENTOLIN HFA) 90 mcg/actuation inhaler Inhale 2 puffs into the lungs every 6 (six) hours as needed for Wheezing. Rescue (Patient not taking: Reported on 11/7/2024) 18 g 2    amiodarone (PACERONE) 200 MG Tab Take 200 mg by mouth. (Patient not taking: Reported on 10/15/2024)      apixaban (ELIQUIS) 5 mg Tab Take 1 tablet (5 mg total) by mouth 2 (two) times daily. (Patient not taking: Reported on 11/7/2024) 60 tablet 5    azithromycin (Z-AMADA) 250 MG tablet Take two tablets now then 1 tab daily x 4 days (Patient not taking: Reported on 11/7/2024) 6 tablet 0    clindamycin (CLEOCIN) 300 MG capsule Take 1 capsule (300 mg total) by mouth 2 (two) times a day. (Patient not taking: Reported on 11/7/2024) 28 capsule 0    clopidogreL (PLAVIX) 75 mg tablet Take 1 tablet by mouth once daily. (Patient not taking: Reported on 11/7/2024)      ezetimibe (ZETIA) 10 mg tablet Take 1 tablet (10 mg total) by mouth once daily. (Patient not taking: Reported on 10/15/2024) 90 tablet 1    HYDROcodone-acetaminophen (NORCO) 5-325 mg per tablet Take 1 tablet by mouth every 6 (six) hours as needed for Pain. (Patient not taking: Reported on 11/7/2024) 15 tablet 0    rosuvastatin (CRESTOR) 5 MG tablet Take 1 tablet (5 mg total) by mouth once daily.  (Patient not taking: Reported on 10/15/2024) 90 tablet 1    valACYclovir (VALTREX) 1000 MG tablet Take 1,000 mg by mouth 3 (three) times daily. (Patient not taking: Reported on 11/7/2024)       No facility-administered encounter medications on file as of 11/7/2024.       Assessment & Plan    As above                                          No orders of the defined types were placed in this encounter.

## 2024-11-10 ENCOUNTER — HOSPITAL ENCOUNTER (INPATIENT)
Facility: HOSPITAL | Age: 79
LOS: 4 days | Discharge: HOME | DRG: 871 | End: 2024-11-14
Attending: FAMILY MEDICINE | Admitting: HOSPITALIST
Payer: MEDICARE

## 2024-11-10 DIAGNOSIS — A41.9 SEPSIS, DUE TO UNSPECIFIED ORGANISM, UNSPECIFIED WHETHER ACUTE ORGAN DYSFUNCTION PRESENT: Primary | ICD-10-CM

## 2024-11-10 DIAGNOSIS — J18.9 PNEUMONIA: ICD-10-CM

## 2024-11-10 DIAGNOSIS — I50.9 CHF (CONGESTIVE HEART FAILURE): ICD-10-CM

## 2024-11-10 DIAGNOSIS — I27.21 HIGH PULMONARY ARTERIAL PRESSURE: ICD-10-CM

## 2024-11-10 PROBLEM — R14.0 BLOATING: Status: RESOLVED | Noted: 2022-08-22 | Resolved: 2024-11-10

## 2024-11-10 LAB
ALBUMIN SERPL BCP-MCNC: 3.4 G/DL (ref 3.5–5)
ALBUMIN/GLOB SERPL: 0.9 {RATIO}
ALP SERPL-CCNC: 113 U/L (ref 45–115)
ALT SERPL W P-5'-P-CCNC: 17 U/L (ref 16–61)
ANION GAP SERPL CALCULATED.3IONS-SCNC: 13 MMOL/L (ref 7–16)
AST SERPL W P-5'-P-CCNC: 15 U/L (ref 15–37)
BASOPHILS # BLD AUTO: 0.05 K/UL (ref 0–0.2)
BASOPHILS NFR BLD AUTO: 0.3 % (ref 0–1)
BILIRUB SERPL-MCNC: 1.1 MG/DL (ref ?–1.2)
BILIRUB UR QL STRIP: NEGATIVE
BUN SERPL-MCNC: 20 MG/DL (ref 7–18)
BUN/CREAT SERPL: 22 (ref 6–20)
CALCIUM SERPL-MCNC: 8.9 MG/DL (ref 8.5–10.1)
CHLORIDE SERPL-SCNC: 104 MMOL/L (ref 98–107)
CLARITY UR: CLEAR
CO2 SERPL-SCNC: 24 MMOL/L (ref 21–32)
COLOR UR: YELLOW
CREAT SERPL-MCNC: 0.92 MG/DL (ref 0.7–1.3)
D DIMER PPP FEU-MCNC: 1.93 ΜG/ML (ref 0–0.47)
DIFFERENTIAL METHOD BLD: ABNORMAL
EGFR (NO RACE VARIABLE) (RUSH/TITUS): 85 ML/MIN/1.73M2
EOSINOPHIL # BLD AUTO: 0 K/UL (ref 0–0.5)
EOSINOPHIL NFR BLD AUTO: 0 % (ref 1–4)
ERYTHROCYTE [DISTWIDTH] IN BLOOD BY AUTOMATED COUNT: 14.5 % (ref 11.5–14.5)
GLOBULIN SER-MCNC: 3.8 G/DL (ref 2–4)
GLUCOSE SERPL-MCNC: 138 MG/DL (ref 74–106)
GLUCOSE SERPL-MCNC: 193 MG/DL (ref 70–105)
GLUCOSE UR STRIP-MCNC: NORMAL MG/DL
HADV DNA NPH QL NAA+NON-PROBE: NOT DETECTED
HCT VFR BLD AUTO: 40.4 % (ref 40–54)
HGB BLD-MCNC: 12.7 G/DL (ref 13.5–18)
HMPV RNA NPH QL NAA+NON-PROBE: NOT DETECTED
IMM GRANULOCYTES # BLD AUTO: 0.11 K/UL (ref 0–0.04)
IMM GRANULOCYTES NFR BLD: 0.6 % (ref 0–0.4)
INFLUENZA A (SUBTYPE H1): NOT DETECTED
INFLUENZA A (SUBTYPE H3): NOT DETECTED
INFLUENZA A (VERIGENE): NOT DETECTED
INFLUENZA A MOLECULAR (OHS): NEGATIVE
INFLUENZA B (VERIGENE): NOT DETECTED
INFLUENZA B MOLECULAR (OHS): NEGATIVE
KETONES UR STRIP-SCNC: 10 MG/DL
LACTATE SERPL-SCNC: 1.3 MMOL/L (ref 0.4–2)
LACTATE SERPL-SCNC: 1.4 MMOL/L (ref 0.4–2)
LEUKOCYTE ESTERASE UR QL STRIP: NEGATIVE
LYMPHOCYTES # BLD AUTO: 0.24 K/UL (ref 1–4.8)
LYMPHOCYTES NFR BLD AUTO: 1.3 % (ref 27–41)
MAGNESIUM SERPL-MCNC: 2.2 MG/DL (ref 1.7–2.3)
MCH RBC QN AUTO: 30 PG (ref 27–31)
MCHC RBC AUTO-ENTMCNC: 31.4 G/DL (ref 32–36)
MCV RBC AUTO: 95.3 FL (ref 80–96)
MONOCYTES # BLD AUTO: 1.02 K/UL (ref 0–0.8)
MONOCYTES NFR BLD AUTO: 5.5 % (ref 2–6)
MPC BLD CALC-MCNC: 10.1 FL (ref 9.4–12.4)
NEUTROPHILS # BLD AUTO: 17.05 K/UL (ref 1.8–7.7)
NEUTROPHILS NFR BLD AUTO: 92.3 % (ref 53–65)
NITRITE UR QL STRIP: NEGATIVE
NRBC # BLD AUTO: 0 X10E3/UL
NRBC, AUTO (.00): 0 %
NT-PROBNP SERPL-MCNC: 2382 PG/ML (ref 1–450)
PARAINFLUENZA 1: NOT DETECTED
PARAINFLUENZA 2: NOT DETECTED
PARAINFLUENZA 3: NOT DETECTED
PARAINFLUENZA 4: NOT DETECTED
PH UR STRIP: 6 PH UNITS
PHOSPHATE SERPL-MCNC: 2 MG/DL (ref 2.5–4.5)
PLATELET # BLD AUTO: 223 K/UL (ref 150–400)
POTASSIUM SERPL-SCNC: 4 MMOL/L (ref 3.5–5.1)
PROT SERPL-MCNC: 7.2 G/DL (ref 6.4–8.2)
PROT UR QL STRIP: 20
RBC # BLD AUTO: 4.24 M/UL (ref 4.6–6.2)
RBC # UR STRIP: NEGATIVE /UL
RESPIRATORY SYNCYTIAL VIRUS A: NOT DETECTED
RESPIRATORY SYNCYTIAL VIRUS B: NOT DETECTED
RHINOVIRUS: NOT DETECTED
SARS-COV-2 RDRP RESP QL NAA+PROBE: NEGATIVE
SODIUM SERPL-SCNC: 137 MMOL/L (ref 136–145)
SP GR UR STRIP: 1.02
TROPONIN I SERPL DL<=0.01 NG/ML-MCNC: 30.1 PG/ML
UROBILINOGEN UR STRIP-ACNC: NORMAL MG/DL
WBC # BLD AUTO: 18.47 K/UL (ref 4.5–11)

## 2024-11-10 PROCEDURE — 80053 COMPREHEN METABOLIC PANEL: CPT | Performed by: FAMILY MEDICINE

## 2024-11-10 PROCEDURE — 82962 GLUCOSE BLOOD TEST: CPT

## 2024-11-10 PROCEDURE — 87635 SARS-COV-2 COVID-19 AMP PRB: CPT | Performed by: FAMILY MEDICINE

## 2024-11-10 PROCEDURE — 87040 BLOOD CULTURE FOR BACTERIA: CPT | Performed by: FAMILY MEDICINE

## 2024-11-10 PROCEDURE — 36415 COLL VENOUS BLD VENIPUNCTURE: CPT | Performed by: FAMILY MEDICINE

## 2024-11-10 PROCEDURE — 63600175 PHARM REV CODE 636 W HCPCS: Mod: JZ,JG | Performed by: FAMILY MEDICINE

## 2024-11-10 PROCEDURE — 63600175 PHARM REV CODE 636 W HCPCS

## 2024-11-10 PROCEDURE — 85379 FIBRIN DEGRADATION QUANT: CPT | Performed by: HOSPITALIST

## 2024-11-10 PROCEDURE — 83735 ASSAY OF MAGNESIUM: CPT | Performed by: FAMILY MEDICINE

## 2024-11-10 PROCEDURE — 25000003 PHARM REV CODE 250

## 2024-11-10 PROCEDURE — 99900035 HC TECH TIME PER 15 MIN (STAT)

## 2024-11-10 PROCEDURE — 84484 ASSAY OF TROPONIN QUANT: CPT | Performed by: HOSPITALIST

## 2024-11-10 PROCEDURE — 83880 ASSAY OF NATRIURETIC PEPTIDE: CPT | Performed by: HOSPITALIST

## 2024-11-10 PROCEDURE — 85025 COMPLETE CBC W/AUTO DIFF WBC: CPT | Performed by: FAMILY MEDICINE

## 2024-11-10 PROCEDURE — 36415 COLL VENOUS BLD VENIPUNCTURE: CPT | Performed by: HOSPITALIST

## 2024-11-10 PROCEDURE — 63600175 PHARM REV CODE 636 W HCPCS: Mod: JZ,JG

## 2024-11-10 PROCEDURE — 96365 THER/PROPH/DIAG IV INF INIT: CPT

## 2024-11-10 PROCEDURE — 87502 INFLUENZA DNA AMP PROBE: CPT | Performed by: FAMILY MEDICINE

## 2024-11-10 PROCEDURE — 94761 N-INVAS EAR/PLS OXIMETRY MLT: CPT

## 2024-11-10 PROCEDURE — 11000001 HC ACUTE MED/SURG PRIVATE ROOM

## 2024-11-10 PROCEDURE — 81003 URINALYSIS AUTO W/O SCOPE: CPT | Performed by: FAMILY MEDICINE

## 2024-11-10 PROCEDURE — 99285 EMERGENCY DEPT VISIT HI MDM: CPT | Mod: 25

## 2024-11-10 PROCEDURE — 25500020 PHARM REV CODE 255: Performed by: HOSPITALIST

## 2024-11-10 PROCEDURE — 83605 ASSAY OF LACTIC ACID: CPT | Performed by: FAMILY MEDICINE

## 2024-11-10 PROCEDURE — 87633 RESP VIRUS 12-25 TARGETS: CPT

## 2024-11-10 PROCEDURE — 25000003 PHARM REV CODE 250: Performed by: FAMILY MEDICINE

## 2024-11-10 PROCEDURE — 96361 HYDRATE IV INFUSION ADD-ON: CPT

## 2024-11-10 PROCEDURE — 84100 ASSAY OF PHOSPHORUS: CPT | Performed by: FAMILY MEDICINE

## 2024-11-10 PROCEDURE — 94799 UNLISTED PULMONARY SVC/PX: CPT

## 2024-11-10 PROCEDURE — 27000221 HC OXYGEN, UP TO 24 HOURS

## 2024-11-10 RX ORDER — DILTIAZEM HYDROCHLORIDE 120 MG/1
240 CAPSULE, COATED, EXTENDED RELEASE ORAL DAILY
Status: DISCONTINUED | OUTPATIENT
Start: 2024-11-11 | End: 2024-11-14 | Stop reason: HOSPADM

## 2024-11-10 RX ORDER — CLINDAMYCIN PHOSPHATE 900 MG/50ML
900 INJECTION, SOLUTION INTRAVENOUS
Status: COMPLETED | OUTPATIENT
Start: 2024-11-10 | End: 2024-11-10

## 2024-11-10 RX ORDER — ALLOPURINOL 100 MG/1
100 TABLET ORAL DAILY
Status: DISCONTINUED | OUTPATIENT
Start: 2024-11-11 | End: 2024-11-14 | Stop reason: HOSPADM

## 2024-11-10 RX ORDER — ACETAMINOPHEN 325 MG/1
650 TABLET ORAL EVERY 4 HOURS PRN
Status: DISCONTINUED | OUTPATIENT
Start: 2024-11-10 | End: 2024-11-14 | Stop reason: HOSPADM

## 2024-11-10 RX ORDER — IBUPROFEN 200 MG
24 TABLET ORAL
Status: DISCONTINUED | OUTPATIENT
Start: 2024-11-10 | End: 2024-11-14 | Stop reason: HOSPADM

## 2024-11-10 RX ORDER — ASPIRIN 81 MG/1
81 TABLET ORAL DAILY
Status: DISCONTINUED | OUTPATIENT
Start: 2024-11-11 | End: 2024-11-14 | Stop reason: HOSPADM

## 2024-11-10 RX ORDER — AZTREONAM 2 G/1
2000 INJECTION, POWDER, LYOPHILIZED, FOR SOLUTION INTRAMUSCULAR; INTRAVENOUS
Status: DISCONTINUED | OUTPATIENT
Start: 2024-11-10 | End: 2024-11-14 | Stop reason: HOSPADM

## 2024-11-10 RX ORDER — IOPAMIDOL 755 MG/ML
100 INJECTION, SOLUTION INTRAVASCULAR
Status: COMPLETED | OUTPATIENT
Start: 2024-11-10 | End: 2024-11-10

## 2024-11-10 RX ORDER — ONDANSETRON HYDROCHLORIDE 2 MG/ML
4 INJECTION, SOLUTION INTRAVENOUS EVERY 8 HOURS PRN
Status: DISCONTINUED | OUTPATIENT
Start: 2024-11-10 | End: 2024-11-14 | Stop reason: HOSPADM

## 2024-11-10 RX ORDER — GLUCAGON 1 MG
1 KIT INJECTION
Status: DISCONTINUED | OUTPATIENT
Start: 2024-11-10 | End: 2024-11-14 | Stop reason: HOSPADM

## 2024-11-10 RX ORDER — SODIUM CHLORIDE 9 MG/ML
1000 INJECTION, SOLUTION INTRAVENOUS
Status: COMPLETED | OUTPATIENT
Start: 2024-11-10 | End: 2024-11-10

## 2024-11-10 RX ORDER — IBUPROFEN 200 MG
16 TABLET ORAL
Status: DISCONTINUED | OUTPATIENT
Start: 2024-11-10 | End: 2024-11-14 | Stop reason: HOSPADM

## 2024-11-10 RX ORDER — ACETAMINOPHEN 500 MG
5000 TABLET ORAL DAILY
Status: DISCONTINUED | OUTPATIENT
Start: 2024-11-11 | End: 2024-11-14 | Stop reason: HOSPADM

## 2024-11-10 RX ORDER — PANTOPRAZOLE SODIUM 40 MG/1
40 TABLET, DELAYED RELEASE ORAL DAILY
Status: DISCONTINUED | OUTPATIENT
Start: 2024-11-11 | End: 2024-11-14 | Stop reason: HOSPADM

## 2024-11-10 RX ORDER — METOPROLOL SUCCINATE 25 MG/1
25 TABLET, EXTENDED RELEASE ORAL DAILY
Status: DISCONTINUED | OUTPATIENT
Start: 2024-11-11 | End: 2024-11-14 | Stop reason: HOSPADM

## 2024-11-10 RX ADMIN — CLINDAMYCIN PHOSPHATE 900 MG: 900 INJECTION, SOLUTION INTRAVENOUS at 03:11

## 2024-11-10 RX ADMIN — IOPAMIDOL 100 ML: 755 INJECTION, SOLUTION INTRAVENOUS at 07:11

## 2024-11-10 RX ADMIN — AZITHROMYCIN MONOHYDRATE 500 MG: 500 INJECTION, POWDER, LYOPHILIZED, FOR SOLUTION INTRAVENOUS at 07:11

## 2024-11-10 RX ADMIN — AZTREONAM 2000 MG: 2 INJECTION, POWDER, LYOPHILIZED, FOR SOLUTION INTRAMUSCULAR; INTRAVENOUS at 09:11

## 2024-11-10 RX ADMIN — SODIUM CHLORIDE 1000 ML: 9 INJECTION, SOLUTION INTRAVENOUS at 01:11

## 2024-11-10 NOTE — HPI
78 yo male present to [facility] c/o chills and a productive cough. He has a PMH of A.fib, CAD, HTN, GERD. Pt stated that his symptoms has been progressively worse over the past few week, but over the past several months, he's had chronic cough and congestion. He was seen by ENT for nasal congestion and  his PCP on 10/28/2024 for exacerbation of his underline lung disease. He was treated with steriods and antibiotics. Pt saw his pulmonologist on 11/7/2024. Chart review notes revealed pulmonary function tests significant for obstructive lung disease, without bronchodilator effect, most likely due to underlying reactive airway disease/asthma. Patient endorses minimal SOB, chills, fever, Pt currently denies headache, chest pain, or lower limb edema.    In ED pt was given 1L NaCl, blood cultures X2 drawn and started on Clindamycin IV. CXR done today showed: coarse interstitial attenuation suggests edema or other nonspecific pneumonitis. There is a more rounded focus of consolidation within the right lower lung zone, could reflect infectious consolidation given short-term development since exam 05/24/2024 however malignancy is not excluded.     Echocardiogram  at [facility] 1/30/2024 was normal. Around this time he had watchman procedure.    Lives with family. Ambulates independently without limitations.     12/16/2022 he had cholecystectomy

## 2024-11-10 NOTE — ED PROVIDER NOTES
Encounter Date: 11/10/2024    SCRIBE #1 NOTE: I, Joy Ortiz, am scribing for, and in the presence of,  Philippe Terry DO. I have scribed the entire note.       History     Chief Complaint   Patient presents with    Chills     The pt is a 80 y/o male coming into the ED with complaints of chills and cough. The pt states his cough has been ongoing for a few weeks now. The pt's sister and wife state that he recently saw Dr. Yeh about the cough. Pt states the cough is very persistent and now also has chills accompanied with it. There are no other complaints at this time.    The history is provided by a relative, the spouse and the patient. No  was used.     Review of patient's allergies indicates:   Allergen Reactions    Pcn [penicillins]      Past Medical History:   Diagnosis Date    Asthma     Atrial fibrillation     Coronary artery disease involving native coronary artery of native heart without angina pectoris     GERD (gastroesophageal reflux disease)     Gout     Hyperlipidemia     Hypertension     Ischemic cardiomyopathy     NSTEMI (non-ST elevated myocardial infarction) 10/2019    Presence of Watchman left atrial appendage closure device     2024     Past Surgical History:   Procedure Laterality Date    APPENDECTOMY      BASAL CELL CARCINOMA EXCISION      BIOPSY WITH TRANSRECTAL ULTRASOUND (TRUS) GUIDANCE      CARDIAC CATHETERIZATION      LAPAROSCOPIC CHOLECYSTECTOMY N/A 01/04/2023    Procedure: CHOLECYSTECTOMY, LAPAROSCOPIC;  Surgeon: Pablito Feliciano MD;  Location: [facility], [place];  Service: General;  Laterality: N/A;    SQUAMOUS CELL CARCINOMA EXCISION       Family History   Problem Relation Name Age of Onset    Heart attack Mother      Colon cancer Father      Cancer Father       Social History     Tobacco Use    Smoking status: Never     Passive exposure: Never    Smokeless tobacco: Never   Substance Use Topics    Alcohol use: Never    Drug use: Never     Review of Systems    Constitutional:  Positive for chills.   Respiratory:  Positive for cough.    All other systems reviewed and are negative.      Physical Exam     Initial Vitals [11/10/24 1210]   BP Pulse Resp Temp SpO2   134/80 (!) 130 (!) 22 100.1 °F (37.8 °C) 96 %      MAP       --         Physical Exam    Nursing note and vitals reviewed.  Constitutional: He appears well-developed and well-nourished. He is not diaphoretic. No distress.   HENT:   Head: Normocephalic and atraumatic.   Nose: Nose normal. Mouth/Throat: Oropharynx is clear and moist.   Eyes: Conjunctivae and EOM are normal. Pupils are equal, round, and reactive to light. No scleral icterus.   Neck: Neck supple. No JVD present.   Normal range of motion.  Cardiovascular:  Normal rate, regular rhythm and normal heart sounds.     Exam reveals no gallop and no friction rub.       No murmur heard.  Pulmonary/Chest: No stridor. No respiratory distress. He has no wheezes. He has rhonchi. He exhibits no tenderness.   Abdominal: Abdomen is soft. Bowel sounds are normal. He exhibits no distension and no mass. There is no abdominal tenderness. There is no rebound and no guarding.   Musculoskeletal:         General: No tenderness or edema. Normal range of motion.      Cervical back: Normal range of motion and neck supple. Normal.      Thoracic back: Normal.      Lumbar back: Normal.     Lymphadenopathy:     He has no cervical adenopathy.   Neurological: He is alert and oriented to person, place, and time. He has normal strength. No cranial nerve deficit.   Skin: Skin is warm and dry. No rash noted. No pallor.   Psychiatric: He has a normal mood and affect. Thought content normal.         ED Course   Procedures  Labs Reviewed   COMPREHENSIVE METABOLIC PANEL - Abnormal       Result Value    Sodium 137      Potassium 4.0      Chloride 104      CO2 24      Anion Gap 13      Glucose 138 (*)     BUN 20 (*)     Creatinine 0.92      BUN/Creatinine Ratio 22 (*)     Calcium 8.9      Total  Protein 7.2      Albumin 3.4 (*)     Globulin 3.8      A/G Ratio 0.9      Bilirubin, Total 1.1      Alk Phos 113      ALT 17      AST 15      eGFR 85     PHOSPHORUS - Abnormal    Phosphorus 2.0 (*)    URINALYSIS, REFLEX TO URINE CULTURE - Abnormal    Color, UA Yellow      Clarity, UA Clear      pH, UA 6.0      Leukocytes, UA Negative      Nitrites, UA Negative      Protein, UA 20 (*)     Glucose, UA Normal      Ketones, UA 10 (*)     Urobilinogen, UA Normal      Bilirubin, UA Negative      Blood, UA Negative      Specific Gravity, UA 1.023     CBC WITH DIFFERENTIAL - Abnormal    WBC 18.47 (*)     RBC 4.24 (*)     Hemoglobin 12.7 (*)     Hematocrit 40.4      MCV 95.3      MCH 30.0      MCHC 31.4 (*)     RDW 14.5      Platelet Count 223      MPV 10.1      Neutrophils % 92.3 (*)     Lymphocytes % 1.3 (*)     Monocytes % 5.5      Eosinophils % 0.0 (*)     Basophils % 0.3      Immature Granulocytes % 0.6 (*)     nRBC, Auto 0.0      Neutrophils, Abs 17.05 (*)     Lymphocytes, Absolute 0.24 (*)     Monocytes, Absolute 1.02 (*)     Eosinophils, Absolute 0.00      Basophils, Absolute 0.05      Immature Granulocytes, Absolute 0.11 (*)     nRBC, Absolute 0.00      Diff Type Auto     NT-PRO NATRIURETIC PEPTIDE - Abnormal    ProBNP 2,382 (*)    D DIMER, QUANTITATIVE - Abnormal    D-Dimer 1.93 (*)    INFLUENZA A & B BY MOLECULAR - Normal    INFLUENZA A MOLECULAR Negative      INFLUENZA B MOLECULAR  Negative     LACTIC ACID, PLASMA - Normal    Lactic Acid 1.4     MAGNESIUM - Normal    Magnesium 2.2     SARS-COV-2 RNA AMPLIFICATION, QUAL - Normal    SARS COV-2 Molecular Negative      Narrative:     Negative SARS-CoV results should not be used as the sole basis for treatment or patient management decisions; negative results should be considered in the context of a patient's recent exposures, history and the presene of clinical signs and symptoms consistent with COVID-19.  Negative results should be treated as presumptive and  confirmed by molecular assay, if necessary for patient management.   LACTIC ACID, PLASMA - Normal    Lactic Acid 1.3     TROPONIN I - Normal    Troponin I High Sensitivity 30.1     CULTURE, AFB   CBC W/ AUTO DIFFERENTIAL    Narrative:     The following orders were created for panel order CBC auto differential.  Procedure                               Abnormality         Status                     ---------                               -----------         ------                     CBC with Differential[[id number]]       Abnormal            Final result                 Please view results for these tests on the individual orders.   POCT GLUCOSE MONITORING CONTINUOUS          Imaging Results               CTA Chest Non-Coronary (PE Studies) (Final result)  Result time 11/10/24 19:39:48      Final result by Nile Garcia MD (11/10/24 19:39:48)                   Impression:      This report was flagged in Epic as abnormal.    1. Allowing for motion artifact, no convincing pulmonary thromboembolism.  2. Multifocal patchy regions of tree-in-bud type nodularity/consolidation primarily involving the right hemithorax, concerning for multifocal infectious process.  There is bilateral peribronchial thickening involving the airways to the lower lobes suggesting bronchial airways inflammation or infection.  Follow-up of the tree-in-bud type nodules/consolidation is recommended, no greater than 3 months, to ensure resolution and to exclude discrete pulmonary nodule.  3. Please see above for several additional findings.      Electronically signed by: Nile Garcia MD  Date:    11/10/2024  Time:    19:39               Narrative:    EXAMINATION:  CTA CHEST NON CORONARY (PE STUDIES)    CLINICAL HISTORY:  Pulmonary embolism (PE) suspected, unknown D-dimer;    TECHNIQUE:  Low dose axial images, sagittal and coronal reformations were obtained from the thoracic inlet to the lung bases following the IV administration of 100 mL of  Isovue 370.  Contrast timing was optimized to evaluate the pulmonary arteries.  MIP images were performed.    COMPARISON:  Radiograph 11/10/2024, CT chest 05/24/2024    FINDINGS:  The structures at the base of the neck are remarkable for a subcentimeter low attenuating lesion within the right lobe of the thyroid, too small for characterization.  There are a few scattered upper limit of normal caliber mediastinal lymph nodes, particularly in the prevascular and precarinal regions.  The heart is not enlarged.  The thoracic aorta tapers normally noting atherosclerotic calcification along its course and in the distribution of the coronary arteries.  There is surgical change of the heart.  Allowing for bolus timing, the visualized portions of the kidneys, adrenal glands, spleen are grossly unremarkable.  There is fatty atrophy of the pancreas without pancreatic ductal dilation.  There is surgical change of cholecystectomy.  The liver is hypoattenuating, possibly reflecting steatosis, correlation with LFTs recommended.  There is a small hiatal hernia.    Motion artifact limits evaluation of the airways and pulmonary parenchyma.  Allowing for this, the airways are patent centrally.  There is occlusion of several distal airways to the lower lobes.  There is bilateral lower lobe peribronchial thickening.  There is bilateral interlobular septal thickening suggesting edema.  There are several nodular foci throughout the right upper lobe, many of which in a tree-in-bud type configuration.  Additional scattered consolidative nodules/ground-glass nodules are noted throughout the right upper lobe and right middle lobe.  Patchy consolidation is noted throughout the right lower lobe noting developing air bronchograms.  There are a few scattered foci of ground-glass attenuation along the medial aspect of the left upper lobe as well as anteriorly, scattered tree-in-bud type nodules within the lingula.  Patchy consolidation noted  within the left lower lobe.  There is a calcified granuloma within the left lower lobe.  There are small bilateral pleural effusions with associated compressive atelectasis of the bilateral lower lobes.  No pneumothorax.    Bolus timing is adequate for evaluation of pulmonary thromboembolism.  Allowing for artifact from motion, no convincing pulmonary arterial filling defects to the level of the segmental branches bilaterally to suggest pulmonary thromboembolism.    There is osteopenia.  There are degenerative changes of the spine.  No significant axillary lymphadenopathy.                                        X-Ray Chest 1 View (Final result)  Result time 11/10/24 13:48:59      Final result by Nile Garcia MD (11/10/24 13:48:59)                   Impression:      This report was flagged in Epic as abnormal.    1. Coarse interstitial attenuation suggests edema or other nonspecific pneumonitis.  There is a more rounded focus of consolidation within the right lower lung zone, could reflect infectious consolidation given short-term development since exam 05/24/2024 however malignancy is not excluded.  Short-term follow-up, no greater than 3 months, is recommended for exclusion of the same.      Electronically signed by: Nile Garcia MD  Date:    11/10/2024  Time:    13:48               Narrative:    EXAMINATION:  XR CHEST 1 VIEW    CLINICAL HISTORY:  Sepsis;    TECHNIQUE:  Single frontal view of the chest was performed.    COMPARISON:  11/07/2024    FINDINGS:  The cardiomediastinal silhouette is not enlarged noting calcification of the aorta..  There is no pleural effusion.  The trachea is midline.  The lungs are symmetrically expanded bilaterally with coarse interstitial attenuation bilaterally.  There is increased parenchymal attenuation projected over the right lower lung zone, noting a more rounded focus medially measuring 2.2 cm..  There is no pneumothorax.  The osseous structures are remarkable for  degenerative change..                                    X-Rays:   Independently Interpreted Readings:   Other Readings:  Xray Chest 1 View :  1. Coarse interstitial attenuation suggests edema or other nonspecific pneumonitis.  There is a more rounded focus of consolidation within the right lower lung zone, could reflect infectious consolidation given short-term development since exam 05/24/2024 however malignancy is not excluded.  Short-term follow-up, no greater than 3 months, is recommended for exclusion of the same.    Medications   allopurinoL tablet 100 mg (100 mg Oral Given 11/14/24 0833)   aspirin EC tablet 81 mg (81 mg Oral Given 11/14/24 0834)   cholecalciferol (vitamin D3) 125 mcg (5,000 unit) tablet 5,000 Units (5,000 Units Oral Given 11/14/24 0834)   diltiaZEM 24 hr capsule 240 mg (240 mg Oral Given 11/14/24 0833)   metoprolol succinate (TOPROL-XL) 24 hr tablet 25 mg (25 mg Oral Given 11/14/24 0833)   multivitamin tablet (1 tablet Oral Given 11/14/24 0833)   pantoprazole EC tablet 40 mg (40 mg Oral Given 11/14/24 0834)   aztreonam injection 2,000 mg (2,000 mg Intravenous Given 11/14/24 0445)   glucose chewable tablet 16 g (has no administration in time range)   glucose chewable tablet 24 g (has no administration in time range)   glucagon (human recombinant) injection 1 mg (has no administration in time range)   acetaminophen tablet 650 mg (650 mg Oral Given 11/12/24 0513)   ondansetron injection 4 mg (has no administration in time range)   dextrose 10% bolus 125 mL 125 mL (has no administration in time range)   dextrose 10% bolus 250 mL 250 mL (has no administration in time range)   albuterol-ipratropium 2.5 mg-0.5 mg/3 mL nebulizer solution 3 mL (3 mLs Nebulization Given 11/14/24 0741)   budesonide nebulizer solution 0.5 mg (0.5 mg Nebulization Given 11/14/24 0813)   acetylcysteine 200 mg/ml (20%) solution 4 mL (4 mLs Nebulization Given 11/14/24 0741)   vancomycin - pharmacy to dose (has no  administration in time range)   azithromycin tablet 500 mg (500 mg Oral Given 11/13/24 2055)   vancomycin (VANCOCIN) 1,500 mg in D5W 250 mL IVPB (has no administration in time range)   0.9%  NaCl infusion (0 mLs Intravenous Stopped 11/10/24 1443)   clindamycin in D5W 900 mg/50 mL IVPB 900 mg (0 mg Intravenous Stopped 11/10/24 1602)   iopamidoL (ISOVUE-370) injection 100 mL (100 mLs Intravenous Given 11/10/24 1903)     Medical Decision Making    Additional MDM:   Sepsis:   This patient does have evidence of infective focus  My overall impression is FEEL LIKE THE PATIENT HAS PNEUMONIA.  ELEVATED WHITE COUNT.  BLOOD PRESSURE STABLE HE IS RUNNING FEVER COUGHING.  Source: Respiratory  Antibiotics given- Antibiotics     Patient Encounter Information Not Found      Latest lactate reviewed- LACTIC ACID WAS REVIEWED AND THE PATIENT WAS NOT ACIDOTIC  Organ dysfunction indicated by NO ORGAN DYSFUNCTION    Fluid challenge Other- Patient to receive PATIENT RECEIVED 1 L BOLUS.  SINCE HIS LACTIC ACID IS NORMAL AND HE RESPONDS WELL IN HIS NO HYPOTENSION A FEEL LIKE WE CAN HOLD ON FLUIDS  volume other than 30cc/kg due to Volume overload due to- HAS BEEN BEFORE THE PATIENT BLOOD PRESSURE STABLE NON ACIDOTIC     Post- resuscitation assessment Yes Perfusion exam was performed within 6 hours of septic shock presentation after bolus shows Adequate tissue perfusion assessed by non-invasive monitoring       Will Not start Pressors- Levophed for MAP of 65  Source control achieved by:  PATIENT SOURCE IS PNEUMONIA 2.  PATIENT WAS GIVEN ANTIBIOTIC COVERAGE               Attending Attestation:           Physician Attestation for Scribe:  Physician Attestation Statement for Scribe #1: I, Philippe Terry, DO, reviewed documentation, as scribed by Joy Ortiz in my presence, and it is both accurate and complete.             ED Course as of 11/14/24 1022   Sun Nov 10, 2024   1352 Xray Chest 1 View :  1. Coarse interstitial attenuation suggests  edema or other nonspecific pneumonitis.  There is a more rounded focus of consolidation within the right lower lung zone, could reflect infectious consolidation given short-term development since exam 05/24/2024 however malignancy is not excluded.  Short-term follow-up, no greater than 3 months, is recommended for exclusion of the same.      [LP]      ED Course User Index  [LP] Joy Ortiz               Medical Decision Making:   Initial Assessment:   The pt is a 78 y/o male coming into the ED with complaints of chills and cough. The pt states his cough has been ongoing for a few weeks now. The pt's sister and wife state that he recently saw Dr. Yeh about the cough. Pt states the cough is very persistent and now also has chills accompanied with it. There are no other complaints at this time.    The history is provided by a relative, the spouse and the patient. No  was used.     Differential Diagnosis:   Pneumonia  Clinical Tests:   Sepsis Perfusion Assessment: "I attest a sepsis perfusion exam was performed within 6 hours of sepsis, severe sepsis, or septic shock presentation, following fluid resuscitation."             Clinical Impression:  Final diagnoses:  [J18.9] Pneumonia          ED Disposition Condition    Admit                 Philippe Terry, DO  11/14/24 1022

## 2024-11-11 LAB
ANION GAP SERPL CALCULATED.3IONS-SCNC: 13 MMOL/L (ref 7–16)
AORTIC ROOT ANNULUS: 2.63 CM
AORTIC VALVE CUSP SEPERATION: 1.96 CM
APICAL FOUR CHAMBER EJECTION FRACTION: 61 %
AV INDEX (PROSTH): 0.6
AV MEAN GRADIENT: 4.3 MMHG
AV PEAK GRADIENT: 7.8 MMHG
AV VALVE AREA BY VELOCITY RATIO: 2.4 CM²
AV VALVE AREA: 2.3 CM²
AV VELOCITY RATIO: 0.64
BASOPHILS # BLD AUTO: 0.07 K/UL (ref 0–0.2)
BASOPHILS NFR BLD AUTO: 0.4 % (ref 0–1)
BSA FOR ECHO PROCEDURE: 2.14 M2
BUN SERPL-MCNC: 17 MG/DL (ref 7–18)
BUN/CREAT SERPL: 22 (ref 6–20)
CALCIUM SERPL-MCNC: 8.6 MG/DL (ref 8.5–10.1)
CHLORIDE SERPL-SCNC: 105 MMOL/L (ref 98–107)
CO2 SERPL-SCNC: 25 MMOL/L (ref 21–32)
CREAT SERPL-MCNC: 0.78 MG/DL (ref 0.7–1.3)
CV ECHO LV RWT: 0.29 CM
DIFFERENTIAL METHOD BLD: ABNORMAL
DOP CALC AO PEAK VEL: 1.4 M/S
DOP CALC AO VTI: 22.8 CM
DOP CALC LVOT AREA: 3.8 CM2
DOP CALC LVOT DIAMETER: 2.2 CM
DOP CALC LVOT PEAK VEL: 0.9 M/S
DOP CALC LVOT STROKE VOLUME: 52.1 CM3
DOP CALCLVOT PEAK VEL VTI: 13.7 CM
E WAVE DECELERATION TIME: 144.82 MSEC
E/A RATIO: 3.03
E/E' RATIO: 8.17 M/S
ECHO LV POSTERIOR WALL: 0.7 CM (ref 0.6–1.1)
EGFR (NO RACE VARIABLE) (RUSH/TITUS): 91 ML/MIN/1.73M2
EOSINOPHIL # BLD AUTO: 0.02 K/UL (ref 0–0.5)
EOSINOPHIL NFR BLD AUTO: 0.1 % (ref 1–4)
ERYTHROCYTE [DISTWIDTH] IN BLOOD BY AUTOMATED COUNT: 14.6 % (ref 11.5–14.5)
FRACTIONAL SHORTENING: 18.8 % (ref 28–44)
GLUCOSE SERPL-MCNC: 126 MG/DL (ref 74–106)
HCT VFR BLD AUTO: 36.7 % (ref 40–54)
HGB BLD-MCNC: 11.9 G/DL (ref 13.5–18)
IMM GRANULOCYTES # BLD AUTO: 0.13 K/UL (ref 0–0.04)
IMM GRANULOCYTES NFR BLD: 0.7 % (ref 0–0.4)
INTERVENTRICULAR SEPTUM: 1.1 CM (ref 0.6–1.1)
IVC DIAMETER: 1.94 CM
LEFT ATRIUM AREA SYSTOLIC (APICAL 4 CHAMBER): 10.67 CM2
LEFT ATRIUM SIZE: 4.37 CM
LEFT INTERNAL DIMENSION IN SYSTOLE: 3.9 CM (ref 2.1–4)
LEFT VENTRICLE DIASTOLIC VOLUME INDEX: 49.87 ML/M2
LEFT VENTRICLE DIASTOLIC VOLUME: 107.21 ML
LEFT VENTRICLE END DIASTOLIC VOLUME APICAL 4 CHAMBER: 61.97 ML
LEFT VENTRICLE END SYSTOLIC VOLUME APICAL 4 CHAMBER: 20.86 ML
LEFT VENTRICLE MASS INDEX: 68.7 G/M2
LEFT VENTRICLE SYSTOLIC VOLUME INDEX: 30.9 ML/M2
LEFT VENTRICLE SYSTOLIC VOLUME: 66.5 ML
LEFT VENTRICULAR INTERNAL DIMENSION IN DIASTOLE: 4.8 CM (ref 3.5–6)
LEFT VENTRICULAR MASS: 147.8 G
LV LATERAL E/E' RATIO: 7.23 M/S
LV SEPTAL E/E' RATIO: 9.4 M/S
LVED V (TEICH): 107.21 ML
LVES V (TEICH): 66.5 ML
LVOT MG: 1.5 MMHG
LVOT MV: 0.57 CM/S
LYMPHOCYTES # BLD AUTO: 0.71 K/UL (ref 1–4.8)
LYMPHOCYTES NFR BLD AUTO: 3.9 % (ref 27–41)
MAGNESIUM SERPL-MCNC: 2.1 MG/DL (ref 1.7–2.3)
MCH RBC QN AUTO: 30.1 PG (ref 27–31)
MCHC RBC AUTO-ENTMCNC: 32.4 G/DL (ref 32–36)
MCV RBC AUTO: 92.7 FL (ref 80–96)
MONOCYTES # BLD AUTO: 1.43 K/UL (ref 0–0.8)
MONOCYTES NFR BLD AUTO: 7.9 % (ref 2–6)
MPC BLD CALC-MCNC: 10.6 FL (ref 9.4–12.4)
MV PEAK A VEL: 0.31 M/S
MV PEAK E VEL: 0.94 M/S
MV STENOSIS PRESSURE HALF TIME: 42 MS
MV VALVE AREA P 1/2 METHOD: 5.24 CM2
NEUTROPHILS # BLD AUTO: 15.75 K/UL (ref 1.8–7.7)
NEUTROPHILS NFR BLD AUTO: 87 % (ref 53–65)
NRBC # BLD AUTO: 0 X10E3/UL
NRBC, AUTO (.00): 0 %
OHS CV RV/LV RATIO: 0.81 CM
PHOSPHATE SERPL-MCNC: 2.1 MG/DL (ref 2.5–4.5)
PISA TR MAX VEL: 2.98 M/S
PLATELET # BLD AUTO: 223 K/UL (ref 150–400)
POTASSIUM SERPL-SCNC: 3.8 MMOL/L (ref 3.5–5.1)
PV PEAK GRADIENT: 3 MMHG
PV PEAK VELOCITY: 0.8 M/S
RA MAJOR: 5.36 CM
RA PRESSURE ESTIMATED: 15 MMHG
RBC # BLD AUTO: 3.96 M/UL (ref 4.6–6.2)
RIGHT VENTRICLE DIASTOLIC BASEL DIMENSION: 3.9 CM
RIGHT VENTRICLE DIASTOLIC LENGTH: 4.6 CM
RIGHT VENTRICLE DIASTOLIC MID DIMENSION: 4 CM
RIGHT VENTRICULAR LENGTH IN DIASTOLE (APICAL 4-CHAMBER VIEW): 4.63 CM
RV MID DIAMA: 3.99 CM
RV TB RVSP: 18 MMHG
SODIUM SERPL-SCNC: 139 MMOL/L (ref 136–145)
TDI LATERAL: 0.13 M/S
TDI SEPTAL: 0.1 M/S
TDI: 0.12 M/S
TR MAX PG: 36 MMHG
TRICUSPID ANNULAR PLANE SYSTOLIC EXCURSION: 1.51 CM
TV REST PULMONARY ARTERY PRESSURE: 51 MMHG
WBC # BLD AUTO: 18.11 K/UL (ref 4.5–11)
Z-SCORE OF LEFT VENTRICULAR DIMENSION IN END DIASTOLE: -3.74
Z-SCORE OF LEFT VENTRICULAR DIMENSION IN END SYSTOLE: -0.68

## 2024-11-11 PROCEDURE — 94761 N-INVAS EAR/PLS OXIMETRY MLT: CPT

## 2024-11-11 PROCEDURE — 83735 ASSAY OF MAGNESIUM: CPT

## 2024-11-11 PROCEDURE — 11000001 HC ACUTE MED/SURG PRIVATE ROOM

## 2024-11-11 PROCEDURE — 87641 MR-STAPH DNA AMP PROBE: CPT | Performed by: HOSPITALIST

## 2024-11-11 PROCEDURE — 85025 COMPLETE CBC W/AUTO DIFF WBC: CPT

## 2024-11-11 PROCEDURE — 84100 ASSAY OF PHOSPHORUS: CPT

## 2024-11-11 PROCEDURE — 25000003 PHARM REV CODE 250

## 2024-11-11 PROCEDURE — 25000242 PHARM REV CODE 250 ALT 637 W/ HCPCS: Performed by: INTERNAL MEDICINE

## 2024-11-11 PROCEDURE — 27000221 HC OXYGEN, UP TO 24 HOURS

## 2024-11-11 PROCEDURE — 99900035 HC TECH TIME PER 15 MIN (STAT)

## 2024-11-11 PROCEDURE — 94640 AIRWAY INHALATION TREATMENT: CPT

## 2024-11-11 PROCEDURE — 36415 COLL VENOUS BLD VENIPUNCTURE: CPT

## 2024-11-11 PROCEDURE — 63600175 PHARM REV CODE 636 W HCPCS: Mod: JZ,JG

## 2024-11-11 PROCEDURE — 80048 BASIC METABOLIC PNL TOTAL CA: CPT

## 2024-11-11 PROCEDURE — 86481 TB AG RESPONSE T-CELL SUSP: CPT

## 2024-11-11 PROCEDURE — 25000003 PHARM REV CODE 250: Performed by: HOSPITALIST

## 2024-11-11 PROCEDURE — 63600175 PHARM REV CODE 636 W HCPCS: Performed by: HOSPITALIST

## 2024-11-11 PROCEDURE — 99233 SBSQ HOSP IP/OBS HIGH 50: CPT | Mod: GC,,, | Performed by: HOSPITALIST

## 2024-11-11 RX ORDER — ACETYLCYSTEINE 200 MG/ML
4 SOLUTION ORAL; RESPIRATORY (INHALATION) 3 TIMES DAILY
Status: DISCONTINUED | OUTPATIENT
Start: 2024-11-11 | End: 2024-11-14 | Stop reason: HOSPADM

## 2024-11-11 RX ORDER — BUDESONIDE 0.5 MG/2ML
0.5 INHALANT ORAL EVERY 12 HOURS
Status: DISCONTINUED | OUTPATIENT
Start: 2024-11-11 | End: 2024-11-14 | Stop reason: HOSPADM

## 2024-11-11 RX ORDER — IPRATROPIUM BROMIDE AND ALBUTEROL SULFATE 2.5; .5 MG/3ML; MG/3ML
3 SOLUTION RESPIRATORY (INHALATION) EVERY 6 HOURS
Status: DISCONTINUED | OUTPATIENT
Start: 2024-11-11 | End: 2024-11-14 | Stop reason: HOSPADM

## 2024-11-11 RX ADMIN — DILTIAZEM HYDROCHLORIDE 240 MG: 120 CAPSULE, COATED, EXTENDED RELEASE ORAL at 09:11

## 2024-11-11 RX ADMIN — ASPIRIN 81 MG: 81 TABLET, COATED ORAL at 09:11

## 2024-11-11 RX ADMIN — ALLOPURINOL 100 MG: 100 TABLET ORAL at 09:11

## 2024-11-11 RX ADMIN — VANCOMYCIN HYDROCHLORIDE 1750 MG: 1 INJECTION, POWDER, LYOPHILIZED, FOR SOLUTION INTRAVENOUS at 11:11

## 2024-11-11 RX ADMIN — AZTREONAM 2000 MG: 2 INJECTION, POWDER, LYOPHILIZED, FOR SOLUTION INTRAMUSCULAR; INTRAVENOUS at 11:11

## 2024-11-11 RX ADMIN — ACETYLCYSTEINE 4 ML: 200 SOLUTION ORAL; RESPIRATORY (INHALATION) at 07:11

## 2024-11-11 RX ADMIN — BUDESONIDE 0.5 MG: 0.5 INHALANT RESPIRATORY (INHALATION) at 07:11

## 2024-11-11 RX ADMIN — CHOLECALCIFEROL TAB 125 MCG (5000 UNIT) 5000 UNITS: 125 TAB at 09:11

## 2024-11-11 RX ADMIN — IPRATROPIUM BROMIDE AND ALBUTEROL SULFATE 3 ML: .5; 3 SOLUTION RESPIRATORY (INHALATION) at 07:11

## 2024-11-11 RX ADMIN — IPRATROPIUM BROMIDE AND ALBUTEROL SULFATE 3 ML: .5; 3 SOLUTION RESPIRATORY (INHALATION) at 01:11

## 2024-11-11 RX ADMIN — THERA TABS 1 TABLET: TAB at 09:11

## 2024-11-11 RX ADMIN — AZTREONAM 2000 MG: 2 INJECTION, POWDER, LYOPHILIZED, FOR SOLUTION INTRAMUSCULAR; INTRAVENOUS at 08:11

## 2024-11-11 RX ADMIN — ACETYLCYSTEINE 4 ML: 200 SOLUTION ORAL; RESPIRATORY (INHALATION) at 02:11

## 2024-11-11 RX ADMIN — AZITHROMYCIN MONOHYDRATE 500 MG: 500 INJECTION, POWDER, LYOPHILIZED, FOR SOLUTION INTRAVENOUS at 08:11

## 2024-11-11 RX ADMIN — AZTREONAM 2000 MG: 2 INJECTION, POWDER, LYOPHILIZED, FOR SOLUTION INTRAMUSCULAR; INTRAVENOUS at 03:11

## 2024-11-11 RX ADMIN — METOPROLOL SUCCINATE 25 MG: 25 TABLET, EXTENDED RELEASE ORAL at 09:11

## 2024-11-11 RX ADMIN — PANTOPRAZOLE SODIUM 40 MG: 40 TABLET, DELAYED RELEASE ORAL at 09:11

## 2024-11-11 NOTE — PROGRESS NOTES
[facility] - [address] [place] [address] Medicine  Progress Note    Patient Name: Gris Hobbs  MRN: [id number]  Patient Class: IP- Inpatient   Admission Date: 11/10/2024  Length of Stay: 1 days  Attending Physician: Yamile Way MD  Primary Care Provider: Yobany Marsh MD        Subjective:     Principal Problem:Sepsis        HPI:  78 yo male present to [facility] c/o chills and a productive cough. He has a PMH of A.fib, CAD, HTN, GERD. Pt stated that his symptoms has been progressively worse over the past few week, but over the past several months, he's had chronic cough and congestion. He was seen by ENT for nasal congestion and  his PCP on 10/28/2024 for exacerbation of his underline lung disease. He was treated with steriods and antibiotics. Pt saw his pulmonologist on 11/7/2024. Chart review notes revealed pulmonary function tests significant for obstructive lung disease, without bronchodilator effect, most likely due to underlying reactive airway disease/asthma. Patient endorses minimal SOB, chills, fever, Pt currently denies headache, chest pain, or lower limb edema.    In ED pt was given 1L NaCl, blood cultures X2 drawn and started on Clindamycin IV. CXR done today showed: coarse interstitial attenuation suggests edema or other nonspecific pneumonitis. There is a more rounded focus of consolidation within the right lower lung zone, could reflect infectious consolidation given short-term development since exam 05/24/2024 however malignancy is not excluded.     Echocardiogram  at [facility] 1/30/2024 was normal. Around this time he had watchman procedure.    Lives with family. Ambulates independently without limitations.     12/16/2022 he had cholecystectomy       Overview/Hospital Course:  No notes on file    Interval History: Pt appears to be a lot better today, he is awake and alert. No acute events overnight.     Review of Systems   Constitutional:  Negative for fever.    HENT:  Positive for congestion. Negative for sneezing.    Eyes:  Negative for visual disturbance.   Respiratory:  Positive for cough. Negative for wheezing.    Cardiovascular:  Negative for chest pain, palpitations and leg swelling.   Gastrointestinal:  Positive for constipation. Negative for abdominal pain and diarrhea.   Neurological:  Negative for dizziness, light-headedness and headaches.     Objective:     Vital Signs (Most Recent):  Temp: 97.8 °F (36.6 °C) (11/11/24 0408)  Pulse: 102 (11/11/24 0608)  Resp: 18 (11/11/24 0408)  BP: 131/79 (11/11/24 0408)  SpO2: 96 % (11/11/24 0408) Vital Signs (24h Range):  Temp:  [97.7 °F (36.5 °C)-100.1 °F (37.8 °C)] 97.8 °F (36.6 °C)  Pulse:  [] 102  Resp:  [18-31] 18  SpO2:  [92 %-100 %] 96 %  BP: (105-149)/(54-81) 131/79     Weight: 86.7 kg (191 lb 3.2 oz)  Body mass index is 23.9 kg/m².  No intake or output data in the 24 hours ending 11/11/24 0634      Physical Exam  Vitals and nursing note reviewed. Exam conducted with a chaperone present.   Constitutional:       General: He is awake. He is not in acute distress.     Appearance: Normal appearance. He is well-developed, well-groomed and overweight. He is not toxic-appearing or diaphoretic.      Interventions: Nasal cannula in place.   HENT:      Head: Normocephalic and atraumatic.      Mouth/Throat:      Mouth: Mucous membranes are dry.      Pharynx: Oropharynx is clear.   Eyes:      Conjunctiva/sclera: Conjunctivae normal.   Cardiovascular:      Rate and Rhythm: Tachycardia present. Rhythm irregular.      Heart sounds: Normal heart sounds.   Pulmonary:      Effort: Tachypnea present. No respiratory distress.      Breath sounds: Rhonchi present. No wheezing.   Abdominal:      Palpations: Abdomen is soft.   Musculoskeletal:      Cervical back: Normal range of motion and neck supple.      Right lower leg: No edema.      Left lower leg: No edema.   Skin:     General: Skin is warm and dry.   Neurological:      Mental  Status: He is alert and oriented to person, place, and time.   Psychiatric:         Mood and Affect: Mood normal.         Behavior: Behavior normal. Behavior is cooperative.         Thought Content: Thought content normal.         Judgment: Judgment normal.             Significant Labs: All pertinent labs within the past 24 hours have been reviewed.    Significant Imaging: I have reviewed all pertinent imaging results/findings within the past 24 hours.    Assessment/Plan:      * Sepsis  This patient does have evidence of infective focus  My overall impression is sepsis.  Source: Respiratory  Antibiotics given-   Antibiotics (72h ago, onward)      Start     Stop Route Frequency Ordered    11/11/24 1100  vancomycin (VANCOCIN) 1,750 mg in D5W 500 mL IVPB         -- IV Every 18 hours 11/11/24 0929 11/11/24 1029  vancomycin - pharmacy to dose         -- IV pharmacy to manage frequency 11/11/24 0929    11/1945  azithromycin (ZITHROMAX) 500 mg in D5W 250 mL IVPB         -- IV Every 24 hours (non-standard times) 11/10/24 1833    11/1945  aztreonam injection 2,000 mg         -- IV Every 8 hours (non-standard times) 11/10/24 1838          Latest lactate reviewed-  Recent Labs   Lab 11/10/24  1629   LACTATE 1.3     Organ dysfunction indicated by  none    Fluid challenge Not needed - patient is not hypotensive      Post- resuscitation assessment No - Post resuscitation assessment not needed       Will Not start Pressors- Levophed for MAP of 65  Source control achieved by: cultures and antibiotics     Pneumonia of right lower lobe due to infectious organism  Patient has a diagnosis of pneumonia. The cause of the pneumonia is suspected to be bacterial in etiology but organism is not known. The pneumonia is stable. The patient has the following signs/symptoms of pneumonia: cough, sputum production, and shortness of breath. The patient does not have a current oxygen requirement and the patient does not have a home  oxygen requirement. I have reviewed the pertinent imaging. The following cultures have been collected: Blood cultures The culture results are listed below.     Current antimicrobial regimen consists of the antibiotics listed below. Will monitor patient closely and continue current treatment plan unchanged.    Antibiotics (From admission, onward)      Start     Stop Route Frequency Ordered    11/11/24 1100  vancomycin (VANCOCIN) 1,750 mg in D5W 500 mL IVPB         -- IV Every 18 hours 11/11/24 0929 11/11/24 1029  vancomycin - pharmacy to dose         -- IV pharmacy to manage frequency 11/11/24 0929    11/1945  azithromycin (ZITHROMAX) 500 mg in D5W 250 mL IVPB         -- IV Every 24 hours (non-standard times) 11/10/24 1833    11/1945  aztreonam injection 2,000 mg         -- IV Every 8 hours (non-standard times) 11/10/24 1838            Microbiology Results (last 7 days)       Procedure Component Value Units Date/Time    AFB Smear Only [[id number]]     Order Status: Sent Specimen: Respiratory from Sputum Induced     AFB Smear Only [[id number]]     Order Status: Sent Specimen: Respiratory from Sputum Induced     Culture, Lower Respiratory [[id number]]     Order Status: Sent Specimen: Respiratory from Sputum Induced     Culture, Lower Respiratory [[id number]]     Order Status: Canceled Specimen: Respiratory from Sputum Expectorated     Culture, AFB [[id number]]     Order Status: Sent Specimen: Respiratory from Sputum Expectorated     AFB Smear Only [[id number]]     Order Status: Canceled Specimen: Respiratory from Sputum Expectorated     Influenza A & B by Molecular [[id number]]  (Normal) Collected: 11/10/24 1307    Order Status: Completed Specimen: Nasopharyngeal Swab Updated: 11/10/24 1421     INFLUENZA A MOLECULAR Negative     INFLUENZA B MOLECULAR  Negative    Blood culture x two cultures. Draw prior to antibiotics [[id number]] Collected: 11/10/24 1342    Order Status: Sent Specimen: Blood from  Peripheral, Hand, Left Updated: 11/10/24 1357    Blood culture x two cultures. Draw prior to antibiotics [[id number]] Collected: 11/10/24 1337    Order Status: Sent Specimen: Blood from Peripheral, Forearm, Left Updated: 11/10/24 1339        -Respiratory panel - negative for all included organisms.     -CTA:   Allowing for motion artifact, no convincing pulmonary thromboembolism.  Multifocal patchy regions of tree-in-bud type nodularity/consolidation primarily involving the right hemithorax, concerning for multifocal infectious process.  There is bilateral peribronchial thickening involving the airways to the lower lobes suggesting bronchial airways inflammation or infection.      Paroxysmal atrial fibrillation  Patient has long standing persistent (>12 months) atrial fibrillation. Patient is currently in atrial fibrillation. [id number] Score: 3. The patients heart rate in the last 24 hours is as follows:  Pulse  Min: 92  Max: 130     Antiarrhythmics  diltiaZEM 24 hr capsule 240 mg, Daily, Oral  metoprolol succinate (TOPROL-XL) 24 hr tablet 25 mg, Daily, Oral    Anticoagulants   none    Plan  - Replete lytes with a goal of K>4, Mg >2  - Patient is not anticoagulated due to watchman  - Patient's afib is currently controlled  - telemetry        Coronary artery disease involving native coronary artery of native heart without angina pectoris  Patient with known CAD, which is controlled Will continue ASA and monitor for S/Sx of angina/ACS. Continue to monitor on telemetry.     Gastroesophageal reflux disease  -continue home Protonix        VTE Risk Mitigation (From admission, onward)           Ordered     IP VTE HIGH RISK PATIENT  Once         11/10/24 1841     Place sequential compression device  Until discontinued         11/10/24 1841                    Discharge Planning   EN:      Code Status: Full Code   Is the patient medically ready for discharge?:     Reason for patient still in hospital (select all that  apply): Patient trending condition, Laboratory test, Treatment, and Imaging                       Sherine Escalera MD  Department of Hospital Medicine   [facility] - [address] [place] [address]

## 2024-11-11 NOTE — ED NOTES
WHILE RN WAS LOADING PT TO BRING UP TO [facility]. DR SALCEDO SUGGEST PT STAY IN ER TO RETREIVE A CT SCAN AND A FEW MORE LABS

## 2024-11-11 NOTE — H&P
[facility] - Emergency Department  Hospital Medicine  History & Physical    Patient Name: Gris Hobbs  MRN: [id number]  Patient Class: IP- Inpatient  Admission Date: 11/10/2024  Attending Physician: Yamile Way MD   Primary Care Provider: Yobany Marsh MD         Patient information was obtained from patient, spouse/SO, past medical records, and ER records.     Subjective:     Principal Problem:Sepsis    Chief Complaint:   Chief Complaint   Patient presents with    Chills        HPI: 80 yo male present to [facility] c/o chills and a productive cough. He has a PMH of A.fib, CAD, HTN, GERD. Pt stated that his symptoms has been progressively worse over the past few week, but over the past several months, he's had chronic cough and congestion. He was seen by ENT for nasal congestion and  his PCP on 10/28/2024 for exacerbation of his underline lung disease. He was treated with steriods and antibiotics. Pt saw his pulmonologist on 11/7/2024. Chart review notes revealed pulmonary function tests significant for obstructive lung disease, without bronchodilator effect, most likely due to underlying reactive airway disease/asthma. Patient endorses minimal SOB, chills, fever, Pt currently denies headache, chest pain, or lower limb edema.    In ED pt was given 1L NaCl, blood cultures X2 drawn and started on Clindamycin IV. CXR done today showed: coarse interstitial attenuation suggests edema or other nonspecific pneumonitis. There is a more rounded focus of consolidation within the right lower lung zone, could reflect infectious consolidation given short-term development since exam 05/24/2024 however malignancy is not excluded.     Echocardiogram  at [facility] 1/30/2024 was normal. Around this time he had watchman procedure.    Lives with family. Ambulates independently without limitations.     12/16/2022 he had cholecystectomy       Past Medical History:   Diagnosis Date    Asthma     Atrial  fibrillation     Coronary artery disease involving native coronary artery of native heart without angina pectoris     GERD (gastroesophageal reflux disease)     Gout     Hyperlipidemia     Hypertension     Ischemic cardiomyopathy     NSTEMI (non-ST elevated myocardial infarction) 10/2019    Presence of Watchman left atrial appendage closure device     2024       Past Surgical History:   Procedure Laterality Date    APPENDECTOMY      BASAL CELL CARCINOMA EXCISION      BIOPSY WITH TRANSRECTAL ULTRASOUND (TRUS) GUIDANCE      CARDIAC CATHETERIZATION      LAPAROSCOPIC CHOLECYSTECTOMY N/A 01/04/2023    Procedure: CHOLECYSTECTOMY, LAPAROSCOPIC;  Surgeon: Pablito Feliciano MD;  Location: [facility];  Service: General;  Laterality: N/A;    SQUAMOUS CELL CARCINOMA EXCISION         Review of patient's allergies indicates:   Allergen Reactions    Pcn [penicillins]        No current facility-administered medications on file prior to encounter.     Current Outpatient Medications on File Prior to Encounter   Medication Sig    acetylcysteine 200 mg/ml, 20%, (MUCOMYST) 200 mg/mL (20 %) nebulizer solution Take 4 mLs by nebulization 3 (three) times daily.    albuterol-ipratropium (DUO-NEB) 2.5 mg-0.5 mg/3 mL nebulizer solution Take 3 mLs by nebulization every 6 (six) hours as needed for Wheezing. Rescue    allopurinoL (ZYLOPRIM) 300 MG tablet Take 1 tablet by mouth once daily    aspirin (ECOTRIN) 81 MG EC tablet Take 81 mg by mouth once daily.    azelastine (ASTELIN) 137 mcg (0.1 %) nasal spray 1 spray (137 mcg total) by Nasal route 2 (two) times daily.    budesonide (PULMICORT) 0.5 mg/2 mL nebulizer solution Take 2 mLs (0.5 mg total) by nebulization 2 (two) times daily. Controller    cholecalciferol, vitamin D3, (VITAMIN D3) 125 mcg (5,000 unit) Tab Take 5,000 Units by mouth once daily.    cyanocobalamin 1,000 mcg/mL injection Inject 1 mL (1,000 mcg total) into the muscle every 30 days.    diltiaZEM (CARDIZEM CD) 240 MG 24 hr capsule  Take 1 capsule (240 mg total) by mouth once daily.    fluticasone propion-salmeteroL 55-14 mcg/actuation AePB Inhale 1 puff into the lungs 2 (two) times a day.    metoprolol succinate (TOPROL-XL) 25 MG 24 hr tablet Take 25 mg by mouth once daily.    multivitamin (ONE DAILY MULTIVITAMIN) per tablet Take 1 tablet by mouth once daily.    pantoprazole (PROTONIX) 40 MG tablet Take 1 tablet by mouth once daily    [DISCONTINUED] albuterol (VENTOLIN HFA) 90 mcg/actuation inhaler Inhale 2 puffs into the lungs every 6 (six) hours as needed for Wheezing. Rescue (Patient not taking: Reported on 11/7/2024)    [DISCONTINUED] amiodarone (PACERONE) 200 MG Tab Take 200 mg by mouth. (Patient not taking: Reported on 10/15/2024)    [DISCONTINUED] apixaban (ELIQUIS) 5 mg Tab Take 1 tablet (5 mg total) by mouth 2 (two) times daily. (Patient not taking: Reported on 11/7/2024)    [DISCONTINUED] azithromycin (Z-AMADA) 250 MG tablet Take two tablets now then 1 tab daily x 4 days (Patient not taking: Reported on 11/7/2024)    [DISCONTINUED] clindamycin (CLEOCIN) 300 MG capsule Take 1 capsule (300 mg total) by mouth 2 (two) times a day. (Patient not taking: Reported on 11/7/2024)    [DISCONTINUED] clopidogreL (PLAVIX) 75 mg tablet Take 1 tablet by mouth once daily. (Patient not taking: Reported on 11/7/2024)    [DISCONTINUED] ezetimibe (ZETIA) 10 mg tablet Take 1 tablet (10 mg total) by mouth once daily. (Patient not taking: Reported on 10/15/2024)    [DISCONTINUED] gabapentin (NEURONTIN) 300 MG capsule Take 300 mg by mouth 3 (three) times daily.    [DISCONTINUED] HYDROcodone-acetaminophen (NORCO) 5-325 mg per tablet Take 1 tablet by mouth every 6 (six) hours as needed for Pain. (Patient not taking: Reported on 11/7/2024)    [DISCONTINUED] ipratropium (ATROVENT) 42 mcg (0.06 %) nasal spray 2 sprays by Each Nostril route 4 (four) times daily.    [DISCONTINUED] mupirocin (BACTROBAN) 2 % ointment Apply topically once daily.    [DISCONTINUED]  rosuvastatin (CRESTOR) 5 MG tablet Take 1 tablet (5 mg total) by mouth once daily. (Patient not taking: Reported on 10/15/2024)    [DISCONTINUED] valACYclovir (VALTREX) 1000 MG tablet Take 1,000 mg by mouth 3 (three) times daily. (Patient not taking: Reported on 11/7/2024)     Family History       Problem Relation (Age of Onset)    Cancer Father    Colon cancer Father    Heart attack Mother          Tobacco Use    Smoking status: Never     Passive exposure: Never    Smokeless tobacco: Never   Substance and Sexual Activity    Alcohol use: Never    Drug use: Never    Sexual activity: Not Currently     Review of Systems   Constitutional:  Positive for chills. Negative for fever.   HENT:  Positive for congestion. Negative for sneezing.    Eyes:  Negative for visual disturbance.   Respiratory:  Positive for cough, chest tightness and shortness of breath. Negative for wheezing.    Cardiovascular:  Negative for chest pain, palpitations and leg swelling.   Gastrointestinal:  Positive for constipation. Negative for abdominal pain and diarrhea.   Neurological:  Negative for dizziness, light-headedness and headaches.     Objective:     Vital Signs (Most Recent):  Temp: 100.1 °F (37.8 °C) (11/10/24 1210)  Pulse: 100 (11/10/24 1455)  Resp: (!) 23 (11/10/24 1455)  BP: (!) 146/67 (11/10/24 1417)  SpO2: 96 % (11/10/24 1455) Vital Signs (24h Range):  Temp:  [100.1 °F (37.8 °C)] 100.1 °F (37.8 °C)  Pulse:  [100-130] 100  Resp:  [20-24] 23  SpO2:  [93 %-98 %] 96 %  BP: (134-149)/(67-81) 146/67     Weight: 83.9 kg (185 lb)  Body mass index is 25.09 kg/m².     Physical Exam  Vitals and nursing note reviewed. Exam conducted with a chaperone present.   Constitutional:       General: He is awake. He is not in acute distress.     Appearance: Normal appearance. He is well-developed, well-groomed and overweight. He is ill-appearing. He is not toxic-appearing or diaphoretic.      Interventions: Nasal cannula in place.   HENT:      Head:  Normocephalic and atraumatic.      Mouth/Throat:      Mouth: Mucous membranes are dry.      Pharynx: Oropharynx is clear.   Eyes:      Conjunctiva/sclera: Conjunctivae normal.   Cardiovascular:      Rate and Rhythm: Tachycardia present. Rhythm irregular.      Heart sounds: Normal heart sounds.   Pulmonary:      Effort: Tachypnea present. No respiratory distress.      Breath sounds: Rhonchi present. No wheezing.   Abdominal:      Palpations: Abdomen is soft.   Musculoskeletal:      Cervical back: Normal range of motion and neck supple.      Right lower leg: No edema.      Left lower leg: No edema.   Skin:     General: Skin is warm and dry.   Neurological:      Mental Status: He is alert and oriented to person, place, and time.   Psychiatric:         Mood and Affect: Mood normal.         Behavior: Behavior normal. Behavior is cooperative.         Thought Content: Thought content normal.         Judgment: Judgment normal.                Significant Labs: All pertinent labs within the past 24 hours have been reviewed.    Significant Imaging: I have reviewed all pertinent imaging results/findings within the past 24 hours.    Assessment/Plan:     * Sepsis  This patient does have evidence of infective focus  My overall impression is sepsis.  Source: Respiratory  Antibiotics given-   Antibiotics (72h ago, onward)      Start     Stop Route Frequency Ordered    11/1945  azithromycin (ZITHROMAX) 500 mg in D5W 250 mL IVPB         -- IV Every 24 hours (non-standard times) 11/10/24 1833    11/1945  aztreonam injection 2,000 mg         -- IV Every 8 hours (non-standard times) 11/10/24 1838          Latest lactate reviewed-  Recent Labs   Lab 11/10/24  1629   LACTATE 1.3     Organ dysfunction indicated by  none    Fluid challenge Not needed - patient is not hypotensive      Post- resuscitation assessment No - Post resuscitation assessment not needed       Will Not start Pressors- Levophed for MAP of 65  Source control  achieved by: cultures and antibiotics     Pneumonia of right lower lobe due to infectious organism  Patient has a diagnosis of pneumonia. The cause of the pneumonia is suspected to be bacterial in etiology but organism is not known. The pneumonia is stable. The patient has the following signs/symptoms of pneumonia: cough, sputum production, and shortness of breath. The patient does not have a current oxygen requirement and the patient does not have a home oxygen requirement. I have reviewed the pertinent imaging. The following cultures have been collected: Blood cultures The culture results are listed below.     Current antimicrobial regimen consists of the antibiotics listed below. Will monitor patient closely and continue current treatment plan unchanged.    Antibiotics (From admission, onward)      Start     Stop Route Frequency Ordered    11/1945  azithromycin (ZITHROMAX) 500 mg in D5W 250 mL IVPB         -- IV Every 24 hours (non-standard times) 11/10/24 1833    11/1945  aztreonam injection 2,000 mg         -- IV Every 8 hours (non-standard times) 11/10/24 1838            Microbiology Results (last 7 days)       Procedure Component Value Units Date/Time    Influenza A & B by Molecular [[id number]]  (Normal) Collected: 11/10/24 1307    Order Status: Completed Specimen: Nasopharyngeal Swab Updated: 11/10/24 1421     INFLUENZA A MOLECULAR Negative     INFLUENZA B MOLECULAR  Negative    Blood culture x two cultures. Draw prior to antibiotics [[id number]] Collected: 11/10/24 1342    Order Status: Sent Specimen: Blood from Peripheral, Hand, Left Updated: 11/10/24 1357    Blood culture x two cultures. Draw prior to antibiotics [[id number]] Collected: 11/10/24 1337    Order Status: Sent Specimen: Blood from Peripheral, Forearm, Left Updated: 11/10/24 1339            Paroxysmal atrial fibrillation  Patient has long standing persistent (>12 months) atrial fibrillation. Patient is currently in atrial  fibrillation. [id number] Score: 3. The patients heart rate in the last 24 hours is as follows:  Pulse  Min: 94  Max: 130     Antiarrhythmics  diltiaZEM 24 hr capsule 240 mg, Daily, Oral  metoprolol succinate (TOPROL-XL) 24 hr tablet 25 mg, Daily, Oral    Anticoagulants   none    Plan  - Replete lytes with a goal of K>4, Mg >2  - Patient is not anticoagulated due to watchman  - Patient's afib is currently controlled  - telemetry        Coronary artery disease involving native coronary artery of native heart without angina pectoris  Patient with known CAD, which is controlled Will continue ASA and monitor for S/Sx of angina/ACS. Continue to monitor on telemetry.     Gastroesophageal reflux disease  -continue home Protonix        VTE Risk Mitigation (From admission, onward)           Ordered     IP VTE HIGH RISK PATIENT  Once         11/10/24 1841     Place sequential compression device  Until discontinued         11/10/24 1841                                    Sherine Escalera MD  Department of Hospital Medicine  [facility] - Emergency Department

## 2024-11-11 NOTE — ASSESSMENT & PLAN NOTE
Patient has a diagnosis of pneumonia. The cause of the pneumonia is suspected to be bacterial in etiology but organism is not known. The pneumonia is stable. The patient has the following signs/symptoms of pneumonia: cough, sputum production, and shortness of breath. The patient does not have a current oxygen requirement and the patient does not have a home oxygen requirement. I have reviewed the pertinent imaging. The following cultures have been collected: Blood cultures The culture results are listed below.     Current antimicrobial regimen consists of the antibiotics listed below. Will monitor patient closely and continue current treatment plan unchanged.    Antibiotics (From admission, onward)      Start     Stop Route Frequency Ordered    11/11/24 1100  vancomycin (VANCOCIN) 1,750 mg in D5W 500 mL IVPB         -- IV Every 18 hours 11/11/24 0929 11/11/24 1029  vancomycin - pharmacy to dose         -- IV pharmacy to manage frequency 11/11/24 0929    11/1945  azithromycin (ZITHROMAX) 500 mg in D5W 250 mL IVPB         -- IV Every 24 hours (non-standard times) 11/10/24 1833    11/1945  aztreonam injection 2,000 mg         -- IV Every 8 hours (non-standard times) 11/10/24 1838            Microbiology Results (last 7 days)       Procedure Component Value Units Date/Time    AFB Smear Only [[id number]]     Order Status: Sent Specimen: Respiratory from Sputum Induced     AFB Smear Only [[id number]]     Order Status: Sent Specimen: Respiratory from Sputum Induced     Culture, Lower Respiratory [[id number]]     Order Status: Sent Specimen: Respiratory from Sputum Induced     Culture, Lower Respiratory [[id number]]     Order Status: Canceled Specimen: Respiratory from Sputum Expectorated     Culture, AFB [[id number]]     Order Status: Sent Specimen: Respiratory from Sputum Expectorated     AFB Smear Only [[id number]]     Order Status: Canceled Specimen: Respiratory from Sputum Expectorated     Influenza A &  B by Molecular [[id number]]  (Normal) Collected: 11/10/24 1307    Order Status: Completed Specimen: Nasopharyngeal Swab Updated: 11/10/24 1421     INFLUENZA A MOLECULAR Negative     INFLUENZA B MOLECULAR  Negative    Blood culture x two cultures. Draw prior to antibiotics [[id number]] Collected: 11/10/24 1342    Order Status: Sent Specimen: Blood from Peripheral, Hand, Left Updated: 11/10/24 1357    Blood culture x two cultures. Draw prior to antibiotics [[id number]] Collected: 11/10/24 1337    Order Status: Sent Specimen: Blood from Peripheral, Forearm, Left Updated: 11/10/24 1339        -Respiratory panel - negative for all included organisms.   -CTA ordered

## 2024-11-11 NOTE — ASSESSMENT & PLAN NOTE
This patient does have evidence of infective focus  My overall impression is sepsis.  Source: Respiratory  Antibiotics given-   Antibiotics (72h ago, onward)      Start     Stop Route Frequency Ordered    11/11/24 1100  vancomycin (VANCOCIN) 1,750 mg in D5W 500 mL IVPB         -- IV Every 18 hours 11/11/24 0929 11/11/24 1029  vancomycin - pharmacy to dose         -- IV pharmacy to manage frequency 11/11/24 0929    11/1945  azithromycin (ZITHROMAX) 500 mg in D5W 250 mL IVPB         -- IV Every 24 hours (non-standard times) 11/10/24 1833    11/1945  aztreonam injection 2,000 mg         -- IV Every 8 hours (non-standard times) 11/10/24 1838          Latest lactate reviewed-  Recent Labs   Lab 11/10/24  1629   LACTATE 1.3     Organ dysfunction indicated by  none    Fluid challenge Not needed - patient is not hypotensive      Post- resuscitation assessment No - Post resuscitation assessment not needed       Will Not start Pressors- Levophed for MAP of 65  Source control achieved by: cultures and antibiotics

## 2024-11-11 NOTE — ASSESSMENT & PLAN NOTE
This patient does have evidence of infective focus  My overall impression is sepsis.  Source: Respiratory  Antibiotics given-   Antibiotics (72h ago, onward)      Start     Stop Route Frequency Ordered    11/1945  azithromycin (ZITHROMAX) 500 mg in D5W 250 mL IVPB         -- IV Every 24 hours (non-standard times) 11/10/24 1833    11/1945  aztreonam injection 2,000 mg         -- IV Every 8 hours (non-standard times) 11/10/24 1838          Latest lactate reviewed-  Recent Labs   Lab 11/10/24  1629   LACTATE 1.3     Organ dysfunction indicated by  none    Fluid challenge Not needed - patient is not hypotensive      Post- resuscitation assessment No - Post resuscitation assessment not needed       Will Not start Pressors- Levophed for MAP of 65  Source control achieved by: cultures and antibiotics

## 2024-11-11 NOTE — PLAN OF CARE
Problem: Adult Inpatient Plan of Care  Goal: Plan of Care Review  Outcome: Progressing  Flowsheets (Taken 11/11/2024 0951)  Plan of Care Reviewed With:   patient   spouse  Goal: Absence of Hospital-Acquired Illness or Injury  Outcome: Progressing  Intervention: Identify and Manage Fall Risk  Flowsheets (Taken 11/11/2024 0951)  Safety Promotion/Fall Prevention:   assistive device/personal item within reach   family to remain at bedside   medications reviewed   nonskid shoes/socks when out of bed   side rails raised x 3  Intervention: Prevent Skin Injury  Flowsheets (Taken 11/11/2024 0951)  Body Position: position changed independently  Intervention: Prevent and Manage VTE (Venous Thromboembolism) Risk  Flowsheets (Taken 11/11/2024 0951)  VTE Prevention/Management: ambulation promoted  Intervention: Prevent Infection  Flowsheets (Taken 11/11/2024 0951)  Infection Prevention:   cohorting utilized   environmental surveillance performed   equipment surfaces disinfected   hand hygiene promoted   single patient room provided   rest/sleep promoted   personal protective equipment utilized  Goal: Optimal Comfort and Wellbeing  Outcome: Progressing  Intervention: Monitor Pain and Promote Comfort  Flowsheets (Taken 11/11/2024 0951)  Pain Management Interventions: quiet environment facilitated  Intervention: Provide Person-Centered Care  Flowsheets (Taken 11/11/2024 0951)  Trust Relationship/Rapport:   care explained   choices provided   thoughts/feelings acknowledged     Problem: Sepsis/Septic Shock  Goal: Optimal Coping  Outcome: Progressing  Intervention: Optimize Psychosocial Adjustment to Illness  Flowsheets (Taken 11/11/2024 0951)  Supportive Measures: active listening utilized  Goal: Absence of Bleeding  Outcome: Progressing  Intervention: Monitor and Manage Bleeding  Flowsheets (Taken 11/11/2024 0951)  Bleeding Precautions: blood pressure closely monitored  Goal: Blood Glucose Level Within Targeted Range  Outcome:  Progressing  Intervention: Optimize Glycemic Control  Flowsheets (Taken 11/11/2024 2172)  Glycemic Management: blood glucose monitored

## 2024-11-11 NOTE — ASSESSMENT & PLAN NOTE
Patient has long standing persistent (>12 months) atrial fibrillation. Patient is currently in atrial fibrillation. [id number] Score: 3. The patients heart rate in the last 24 hours is as follows:  Pulse  Min: 92  Max: 130     Antiarrhythmics  diltiaZEM 24 hr capsule 240 mg, Daily, Oral  metoprolol succinate (TOPROL-XL) 24 hr tablet 25 mg, Daily, Oral    Anticoagulants   none    Plan  - Replete lytes with a goal of K>4, Mg >2  - Patient is not anticoagulated due to watchman  - Patient's afib is currently controlled  - telemetry

## 2024-11-11 NOTE — ASSESSMENT & PLAN NOTE
Patient has a diagnosis of pneumonia. The cause of the pneumonia is suspected to be bacterial in etiology but organism is not known. The pneumonia is stable. The patient has the following signs/symptoms of pneumonia: cough, sputum production, and shortness of breath. The patient does not have a current oxygen requirement and the patient does not have a home oxygen requirement. I have reviewed the pertinent imaging. The following cultures have been collected: Blood cultures The culture results are listed below.     Current antimicrobial regimen consists of the antibiotics listed below. Will monitor patient closely and continue current treatment plan unchanged.    Antibiotics (From admission, onward)      Start     Stop Route Frequency Ordered    11/11/24 1100  vancomycin (VANCOCIN) 1,750 mg in D5W 500 mL IVPB         -- IV Every 18 hours 11/11/24 0929 11/11/24 1029  vancomycin - pharmacy to dose         -- IV pharmacy to manage frequency 11/11/24 0929    11/1945  azithromycin (ZITHROMAX) 500 mg in D5W 250 mL IVPB         -- IV Every 24 hours (non-standard times) 11/10/24 1833    11/1945  aztreonam injection 2,000 mg         -- IV Every 8 hours (non-standard times) 11/10/24 1838            Microbiology Results (last 7 days)       Procedure Component Value Units Date/Time    Culture, Lower Respiratory [[id number]] Collected: 11/12/24 1217    Order Status: Completed Specimen: Respiratory from Sputum Induced Updated: 11/12/24 1356     Gram Stain Result Few WBC observed      Moderate Epithelial cells      Many Gram positive cocci      Moderate Yeast    Culture, AFB [[id number]] Collected: 11/12/24 1217    Order Status: Sent Specimen: Respiratory from Sputum Expectorated Updated: 11/12/24 1248    AFB Smear Only [[id number]] Collected: 11/12/24 1217    Order Status: Canceled Specimen: Respiratory from Sputum Induced Updated: 11/12/24 1248    AFB Smear Only [[id number]] Collected: 11/12/24 1217    Order  Status: Canceled Specimen: Respiratory from Sputum Induced Updated: 11/12/24 1248    AFB Smear Only [[id number]] Collected: 11/12/24 1217    Order Status: Sent Specimen: Respiratory from Sputum Induced Updated: 11/12/24 1248    Blood culture x two cultures. Draw prior to antibiotics [[id number]] Collected: 11/10/24 1342    Order Status: Completed Specimen: Blood from Peripheral, Hand, Left Updated: 11/12/24 0656     Culture, Blood No Growth At 24 Hours    Blood culture x two cultures. Draw prior to antibiotics [[id number]] Collected: 11/10/24 1337    Order Status: Completed Specimen: Blood from Peripheral, Forearm, Left Updated: 11/12/24 0656     Culture, Blood No Growth At 24 Hours    Culture, Lower Respiratory [[id number]]     Order Status: Canceled Specimen: Respiratory from Sputum Expectorated     AFB Smear Only [[id number]]     Order Status: Canceled Specimen: Respiratory from Sputum Expectorated     Influenza A & B by Molecular [[id number]]  (Normal) Collected: 11/10/24 1307    Order Status: Completed Specimen: Nasopharyngeal Swab Updated: 11/10/24 1421     INFLUENZA A MOLECULAR Negative     INFLUENZA B MOLECULAR  Negative        -Respiratory panel - negative for all included organisms.     -CTA:   Allowing for motion artifact, no convincing pulmonary thromboembolism.  Multifocal patchy regions of tree-in-bud type nodularity/consolidation primarily involving the right hemithorax, concerning for multifocal infectious process.  There is bilateral peribronchial thickening involving the airways to the lower lobes suggesting bronchial airways inflammation or infection.

## 2024-11-11 NOTE — PLAN OF CARE
[facility]  Initial Discharge Assessment       Primary Care Provider: Yobany Marsh MD    Admission Diagnosis: CHF (congestive heart failure) [I50.9]  Pneumonia [J18.9]    Admission Date: 11/10/2024  Expected Discharge Date:     Transition of Care Barriers: None    Payor: MEDICARE / Plan: MEDICARE PART A & B / Product Type: Government /     Extended Emergency Contact Information  Primary Emergency Contact: ALE PHILLIPS  Home Phone: [phone]  Relation: Spouse  Preferred language: English   needed? No    Discharge Plan A: Home with family  Discharge Plan B: Home with family      [facility] - [place], [place] - [address]  [address] [address]  Phone: [phone] Fax: [phone]    [facility] [address] [place], [place] - [address]  [address] [place] [address]  Phone: [phone] Fax: [phone]    [facility]  [address] [place] [address]  Phone: [phone] Fax: [phone]      Initial Assessment (most recent)       Adult Discharge Assessment - 11/11/24 1153          Discharge Assessment    Assessment Type Discharge Planning Assessment     Source of Information patient     Communicated EN with patient/caregiver Date not available/Unable to determine     People in Home spouse     Do you expect to return to your current living situation? Yes     Do you have help at home or someone to help you manage your care at home? Yes     Who are your caregiver(s) and their phone number(s)? wife     Prior to hospitilization cognitive status: Unable to Assess     Current cognitive status: Alert/Oriented     Dressing/Bathing Difficulty no     Home Accessibility wheelchair accessible     Home Layout Able to live on 1st floor     Equipment Currently Used at Home none     Readmission within 30 days? No     Patient currently being followed by outpatient case management? No     Do you currently have  service(s) that help you manage your care at home? No     Do you take prescription medications? Yes     Do you have prescription coverage? Yes     Coverage Medicare / BCBS of [place]     Do you have any problems affording any of your prescribed medications? No     Is the patient taking medications as prescribed? yes     Who is going to help you get home at discharge? spouse     How do you get to doctors appointments? car, drives self     Are you on dialysis? No     Do you take coumadin? No     Discharge Plan A Home with family     Discharge Plan B Home with family     DME Needed Upon Discharge  none     Discharge Plan discussed with: Patient     Transition of Care Barriers None                        CM spoke with pt in the room. Pt lives at home with his wife. Pt is not current with home health,no dme pta. Discharge plan is to return home with no other needs. CM will follow

## 2024-11-11 NOTE — ED NOTES
RN NOTIFIED MD THAT PT SEEMED TO BE VOIDING FREQUENTLY BUT VERY LITTLE AT A TIME. RN RECOMMENDED BLADDER SCAN. MD VERBALLY ORDERED. VERY LITTLE RESIDUAL WAS FOUND IN BLADDER

## 2024-11-11 NOTE — ASSESSMENT & PLAN NOTE
Patient has long standing persistent (>12 months) atrial fibrillation. Patient is currently in atrial fibrillation. [id number] Score: 3. The patients heart rate in the last 24 hours is as follows:  Pulse  Min: 94  Max: 130     Antiarrhythmics  diltiaZEM 24 hr capsule 240 mg, Daily, Oral  metoprolol succinate (TOPROL-XL) 24 hr tablet 25 mg, Daily, Oral    Anticoagulants   none    Plan  - Replete lytes with a goal of K>4, Mg >2  - Patient is not anticoagulated due to watchman  - Patient's afib is currently controlled  - telemetry

## 2024-11-11 NOTE — PROGRESS NOTES
Pharmacokinetic Initial Assessment: IV Vancomycin    Assessment/Plan:    Initiate intravenous vancomycin with loading dose of 1750 mg once followed by a maintenance dose of vancomycin 1750 mg IV every 18 hours  Desired empiric serum trough concentration is 15 to 20 mcg/mL  Draw vancomycin trough level 30 min prior to fourth dose on 11/13 at approximately 1630.  Pharmacy will continue to follow and monitor vancomycin.      Please contact pharmacy at extension [phone] with any questions regarding this assessment.     Thank you for the consult,   Beatrice Brito       Patient brief summary:  Gris Hobbs is a 79 y.o. male initiated on antimicrobial therapy with IV Vancomycin for treatment of suspected lower respiratory infection    Drug Allergies:   Review of patient's allergies indicates:   Allergen Reactions    Pcn [penicillins]        Actual Body Weight:   86.7 kg    Renal Function:   Estimated Creatinine Clearance: 91.8 mL/min (based on SCr of 0.78 mg/dL).,     Dialysis Method (if applicable):  N/A    CBC (last 72 hours):  Recent Labs   Lab Result Units 11/10/24  1329 11/11/24  0434   WBC K/uL 18.47* 18.11*   Hemoglobin g/dL 12.7* 11.9*   Hematocrit % 40.4 36.7*   Platelet Count K/uL 223 223   Lymphocytes % % 1.3* 3.9*   Monocytes % % 5.5 7.9*   Eosinophils % % 0.0* 0.1*   Basophils % % 0.3 0.4   Diff Type  Auto Auto       Metabolic Panel (last 72 hours):  Recent Labs   Lab Result Units 11/10/24  1307 11/10/24  1329 11/11/24  0434   Sodium mmol/L  --  137 139   Potassium mmol/L  --  4.0 3.8   Chloride mmol/L  --  104 105   CO2 mmol/L  --  24 25   Glucose mg/dL  --  138* 126*   Glucose, UA mg/dL Normal  --   --    BUN mg/dL  --  20* 17   Creatinine mg/dL  --  0.92 0.78   Albumin g/dL  --  3.4*  --    Bilirubin, Total mg/dL  --  1.1  --    Alk Phos U/L  --  113  --    AST U/L  --  15  --    ALT U/L  --  17  --    Magnesium mg/dL  --  2.2 2.1   Phosphorus mg/dL  --  2.0* 2.1*       Drug levels (last 3 results):  No  "results for input(s): "VANCOMYCINRA", "VANCORANDOM", "VANCOMYCINPE", "VANCOPEAK", "VANCOMYCINTR", "VANCOTROUGH" in the last 72 hours.    Microbiologic Results:  Microbiology Results (last 7 days)       Procedure Component Value Units Date/Time    AFB Smear Only [[id number]]     Order Status: Sent Specimen: Respiratory from Sputum Induced     Culture, Lower Respiratory [[id number]]     Order Status: Sent Specimen: Respiratory from Sputum Induced     Culture, Lower Respiratory [[id number]]     Order Status: Canceled Specimen: Respiratory from Sputum Expectorated     Culture, AFB [[id number]]     Order Status: Sent Specimen: Respiratory from Sputum Expectorated     AFB Smear Only [[id number]]     Order Status: Canceled Specimen: Respiratory from Sputum Expectorated     Influenza A & B by Molecular [[id number]]  (Normal) Collected: 11/10/24 1307    Order Status: Completed Specimen: Nasopharyngeal Swab Updated: 11/10/24 1421     INFLUENZA A MOLECULAR Negative     INFLUENZA B MOLECULAR  Negative    Blood culture x two cultures. Draw prior to antibiotics [[id number]] Collected: 11/10/24 1342    Order Status: Sent Specimen: Blood from Peripheral, Hand, Left Updated: 11/10/24 1357    Blood culture x two cultures. Draw prior to antibiotics [[id number]] Collected: 11/10/24 1337    Order Status: Sent Specimen: Blood from Peripheral, Forearm, Left Updated: 11/10/24 1339            "

## 2024-11-11 NOTE — ASSESSMENT & PLAN NOTE
Patient has a diagnosis of pneumonia. The cause of the pneumonia is suspected to be bacterial in etiology but organism is not known. The pneumonia is stable. The patient has the following signs/symptoms of pneumonia: cough, sputum production, and shortness of breath. The patient does not have a current oxygen requirement and the patient does not have a home oxygen requirement. I have reviewed the pertinent imaging. The following cultures have been collected: Blood cultures The culture results are listed below.     Current antimicrobial regimen consists of the antibiotics listed below. Will monitor patient closely and continue current treatment plan unchanged.    Antibiotics (From admission, onward)      Start     Stop Route Frequency Ordered    11/1945  azithromycin (ZITHROMAX) 500 mg in D5W 250 mL IVPB         -- IV Every 24 hours (non-standard times) 11/10/24 1833    11/1945  aztreonam injection 2,000 mg         -- IV Every 8 hours (non-standard times) 11/10/24 1838            Microbiology Results (last 7 days)       Procedure Component Value Units Date/Time    Influenza A & B by Molecular [[id number]]  (Normal) Collected: 11/10/24 1307    Order Status: Completed Specimen: Nasopharyngeal Swab Updated: 11/10/24 1421     INFLUENZA A MOLECULAR Negative     INFLUENZA B MOLECULAR  Negative    Blood culture x two cultures. Draw prior to antibiotics [[id number]] Collected: 11/10/24 1342    Order Status: Sent Specimen: Blood from Peripheral, Hand, Left Updated: 11/10/24 1357    Blood culture x two cultures. Draw prior to antibiotics [[id number]] Collected: 11/10/24 1337    Order Status: Sent Specimen: Blood from Peripheral, Forearm, Left Updated: 11/10/24 1330

## 2024-11-11 NOTE — SUBJECTIVE & OBJECTIVE
Interval History: Pt appears to be a lot better today, he is awake and alert. No acute events overnight.     Review of Systems   Constitutional:  Negative for fever.   HENT:  Positive for congestion. Negative for sneezing.    Eyes:  Negative for visual disturbance.   Respiratory:  Positive for cough. Negative for wheezing.    Cardiovascular:  Negative for chest pain, palpitations and leg swelling.   Gastrointestinal:  Positive for constipation. Negative for abdominal pain and diarrhea.   Neurological:  Negative for dizziness, light-headedness and headaches.     Objective:     Vital Signs (Most Recent):  Temp: 97.8 °F (36.6 °C) (11/11/24 0408)  Pulse: 102 (11/11/24 0608)  Resp: 18 (11/11/24 0408)  BP: 131/79 (11/11/24 0408)  SpO2: 96 % (11/11/24 0408) Vital Signs (24h Range):  Temp:  [97.7 °F (36.5 °C)-100.1 °F (37.8 °C)] 97.8 °F (36.6 °C)  Pulse:  [] 102  Resp:  [18-31] 18  SpO2:  [92 %-100 %] 96 %  BP: (105-149)/(54-81) 131/79     Weight: 86.7 kg (191 lb 3.2 oz)  Body mass index is 23.9 kg/m².  No intake or output data in the 24 hours ending 11/11/24 0634      Physical Exam  Vitals and nursing note reviewed. Exam conducted with a chaperone present.   Constitutional:       General: He is awake. He is not in acute distress.     Appearance: Normal appearance. He is well-developed, well-groomed and overweight. He is not toxic-appearing or diaphoretic.      Interventions: Nasal cannula in place.   HENT:      Head: Normocephalic and atraumatic.      Mouth/Throat:      Mouth: Mucous membranes are dry.      Pharynx: Oropharynx is clear.   Eyes:      Conjunctiva/sclera: Conjunctivae normal.   Cardiovascular:      Rate and Rhythm: Tachycardia present. Rhythm irregular.      Heart sounds: Normal heart sounds.   Pulmonary:      Effort: Tachypnea present. No respiratory distress.      Breath sounds: Rhonchi present. No wheezing.   Abdominal:      Palpations: Abdomen is soft.   Musculoskeletal:      Cervical back: Normal  range of motion and neck supple.      Right lower leg: No edema.      Left lower leg: No edema.   Skin:     General: Skin is warm and dry.   Neurological:      Mental Status: He is alert and oriented to person, place, and time.   Psychiatric:         Mood and Affect: Mood normal.         Behavior: Behavior normal. Behavior is cooperative.         Thought Content: Thought content normal.         Judgment: Judgment normal.             Significant Labs: All pertinent labs within the past 24 hours have been reviewed.    Significant Imaging: I have reviewed all pertinent imaging results/findings within the past 24 hours.

## 2024-11-11 NOTE — SUBJECTIVE & OBJECTIVE
Past Medical History:   Diagnosis Date    Asthma     Atrial fibrillation     Coronary artery disease involving native coronary artery of native heart without angina pectoris     GERD (gastroesophageal reflux disease)     Gout     Hyperlipidemia     Hypertension     Ischemic cardiomyopathy     NSTEMI (non-ST elevated myocardial infarction) 10/2019    Presence of Watchman left atrial appendage closure device     2024       Past Surgical History:   Procedure Laterality Date    APPENDECTOMY      BASAL CELL CARCINOMA EXCISION      BIOPSY WITH TRANSRECTAL ULTRASOUND (TRUS) GUIDANCE      CARDIAC CATHETERIZATION      LAPAROSCOPIC CHOLECYSTECTOMY N/A 01/04/2023    Procedure: CHOLECYSTECTOMY, LAPAROSCOPIC;  Surgeon: Pablito Feliciano MD;  Location: [facility];  Service: General;  Laterality: N/A;    SQUAMOUS CELL CARCINOMA EXCISION         Review of patient's allergies indicates:   Allergen Reactions    Pcn [penicillins]        No current facility-administered medications on file prior to encounter.     Current Outpatient Medications on File Prior to Encounter   Medication Sig    acetylcysteine 200 mg/ml, 20%, (MUCOMYST) 200 mg/mL (20 %) nebulizer solution Take 4 mLs by nebulization 3 (three) times daily.    albuterol-ipratropium (DUO-NEB) 2.5 mg-0.5 mg/3 mL nebulizer solution Take 3 mLs by nebulization every 6 (six) hours as needed for Wheezing. Rescue    allopurinoL (ZYLOPRIM) 300 MG tablet Take 1 tablet by mouth once daily    aspirin (ECOTRIN) 81 MG EC tablet Take 81 mg by mouth once daily.    azelastine (ASTELIN) 137 mcg (0.1 %) nasal spray 1 spray (137 mcg total) by Nasal route 2 (two) times daily.    budesonide (PULMICORT) 0.5 mg/2 mL nebulizer solution Take 2 mLs (0.5 mg total) by nebulization 2 (two) times daily. Controller    cholecalciferol, vitamin D3, (VITAMIN D3) 125 mcg (5,000 unit) Tab Take 5,000 Units by mouth once daily.    cyanocobalamin 1,000 mcg/mL injection Inject 1 mL (1,000 mcg total) into the muscle  every 30 days.    diltiaZEM (CARDIZEM CD) 240 MG 24 hr capsule Take 1 capsule (240 mg total) by mouth once daily.    fluticasone propion-salmeteroL 55-14 mcg/actuation AePB Inhale 1 puff into the lungs 2 (two) times a day.    metoprolol succinate (TOPROL-XL) 25 MG 24 hr tablet Take 25 mg by mouth once daily.    multivitamin (ONE DAILY MULTIVITAMIN) per tablet Take 1 tablet by mouth once daily.    pantoprazole (PROTONIX) 40 MG tablet Take 1 tablet by mouth once daily    [DISCONTINUED] albuterol (VENTOLIN HFA) 90 mcg/actuation inhaler Inhale 2 puffs into the lungs every 6 (six) hours as needed for Wheezing. Rescue (Patient not taking: Reported on 11/7/2024)    [DISCONTINUED] amiodarone (PACERONE) 200 MG Tab Take 200 mg by mouth. (Patient not taking: Reported on 10/15/2024)    [DISCONTINUED] apixaban (ELIQUIS) 5 mg Tab Take 1 tablet (5 mg total) by mouth 2 (two) times daily. (Patient not taking: Reported on 11/7/2024)    [DISCONTINUED] azithromycin (Z-AMADA) 250 MG tablet Take two tablets now then 1 tab daily x 4 days (Patient not taking: Reported on 11/7/2024)    [DISCONTINUED] clindamycin (CLEOCIN) 300 MG capsule Take 1 capsule (300 mg total) by mouth 2 (two) times a day. (Patient not taking: Reported on 11/7/2024)    [DISCONTINUED] clopidogreL (PLAVIX) 75 mg tablet Take 1 tablet by mouth once daily. (Patient not taking: Reported on 11/7/2024)    [DISCONTINUED] ezetimibe (ZETIA) 10 mg tablet Take 1 tablet (10 mg total) by mouth once daily. (Patient not taking: Reported on 10/15/2024)    [DISCONTINUED] gabapentin (NEURONTIN) 300 MG capsule Take 300 mg by mouth 3 (three) times daily.    [DISCONTINUED] HYDROcodone-acetaminophen (NORCO) 5-325 mg per tablet Take 1 tablet by mouth every 6 (six) hours as needed for Pain. (Patient not taking: Reported on 11/7/2024)    [DISCONTINUED] ipratropium (ATROVENT) 42 mcg (0.06 %) nasal spray 2 sprays by Each Nostril route 4 (four) times daily.    [DISCONTINUED] mupirocin (BACTROBAN) 2  % ointment Apply topically once daily.    [DISCONTINUED] rosuvastatin (CRESTOR) 5 MG tablet Take 1 tablet (5 mg total) by mouth once daily. (Patient not taking: Reported on 10/15/2024)    [DISCONTINUED] valACYclovir (VALTREX) 1000 MG tablet Take 1,000 mg by mouth 3 (three) times daily. (Patient not taking: Reported on 11/7/2024)     Family History       Problem Relation (Age of Onset)    Cancer Father    Colon cancer Father    Heart attack Mother          Tobacco Use    Smoking status: Never     Passive exposure: Never    Smokeless tobacco: Never   Substance and Sexual Activity    Alcohol use: Never    Drug use: Never    Sexual activity: Not Currently     Review of Systems   Constitutional:  Positive for chills. Negative for fever.   HENT:  Positive for congestion. Negative for sneezing.    Eyes:  Negative for visual disturbance.   Respiratory:  Positive for cough, chest tightness and shortness of breath. Negative for wheezing.    Cardiovascular:  Negative for chest pain, palpitations and leg swelling.   Gastrointestinal:  Positive for constipation. Negative for abdominal pain and diarrhea.   Neurological:  Negative for dizziness, light-headedness and headaches.     Objective:     Vital Signs (Most Recent):  Temp: 100.1 °F (37.8 °C) (11/10/24 1210)  Pulse: 100 (11/10/24 1455)  Resp: (!) 23 (11/10/24 1455)  BP: (!) 146/67 (11/10/24 1417)  SpO2: 96 % (11/10/24 1455) Vital Signs (24h Range):  Temp:  [100.1 °F (37.8 °C)] 100.1 °F (37.8 °C)  Pulse:  [100-130] 100  Resp:  [20-24] 23  SpO2:  [93 %-98 %] 96 %  BP: (134-149)/(67-81) 146/67     Weight: 83.9 kg (185 lb)  Body mass index is 25.09 kg/m².     Physical Exam  Vitals and nursing note reviewed. Exam conducted with a chaperone present.   Constitutional:       General: He is awake. He is not in acute distress.     Appearance: Normal appearance. He is well-developed, well-groomed and overweight. He is ill-appearing. He is not toxic-appearing or diaphoretic.       Interventions: Nasal cannula in place.   HENT:      Head: Normocephalic and atraumatic.      Mouth/Throat:      Mouth: Mucous membranes are dry.      Pharynx: Oropharynx is clear.   Eyes:      Conjunctiva/sclera: Conjunctivae normal.   Cardiovascular:      Rate and Rhythm: Tachycardia present. Rhythm irregular.      Heart sounds: Normal heart sounds.   Pulmonary:      Effort: Tachypnea present. No respiratory distress.      Breath sounds: Rhonchi present. No wheezing.   Abdominal:      Palpations: Abdomen is soft.   Musculoskeletal:      Cervical back: Normal range of motion and neck supple.      Right lower leg: No edema.      Left lower leg: No edema.   Skin:     General: Skin is warm and dry.   Neurological:      Mental Status: He is alert and oriented to person, place, and time.   Psychiatric:         Mood and Affect: Mood normal.         Behavior: Behavior normal. Behavior is cooperative.         Thought Content: Thought content normal.         Judgment: Judgment normal.                Significant Labs: All pertinent labs within the past 24 hours have been reviewed.    Significant Imaging: I have reviewed all pertinent imaging results/findings within the past 24 hours.

## 2024-11-12 LAB
AFB SMEAR (FA): NORMAL
ANION GAP SERPL CALCULATED.3IONS-SCNC: 7 MMOL/L (ref 7–16)
BASOPHILS # BLD AUTO: 0.03 K/UL (ref 0–0.2)
BASOPHILS NFR BLD AUTO: 0.2 % (ref 0–1)
BUN SERPL-MCNC: 20 MG/DL (ref 7–18)
BUN/CREAT SERPL: 23 (ref 6–20)
CALCIUM SERPL-MCNC: 8.7 MG/DL (ref 8.5–10.1)
CHLORIDE SERPL-SCNC: 105 MMOL/L (ref 98–107)
CO2 SERPL-SCNC: 28 MMOL/L (ref 21–32)
CREAT SERPL-MCNC: 0.86 MG/DL (ref 0.7–1.3)
DIFFERENTIAL METHOD BLD: ABNORMAL
EGFR (NO RACE VARIABLE) (RUSH/TITUS): 88 ML/MIN/1.73M2
EOSINOPHIL # BLD AUTO: 0 K/UL (ref 0–0.5)
EOSINOPHIL NFR BLD AUTO: 0 % (ref 1–4)
ERYTHROCYTE [DISTWIDTH] IN BLOOD BY AUTOMATED COUNT: 14.5 % (ref 11.5–14.5)
GLUCOSE SERPL-MCNC: 152 MG/DL (ref 74–106)
HCT VFR BLD AUTO: 35.8 % (ref 40–54)
HGB BLD-MCNC: 11.4 G/DL (ref 13.5–18)
IMM GRANULOCYTES # BLD AUTO: 0.14 K/UL (ref 0–0.04)
IMM GRANULOCYTES NFR BLD: 0.9 % (ref 0–0.4)
LYMPHOCYTES # BLD AUTO: 0.56 K/UL (ref 1–4.8)
LYMPHOCYTES NFR BLD AUTO: 3.5 % (ref 27–41)
MAGNESIUM SERPL-MCNC: 2 MG/DL (ref 1.7–2.3)
MCH RBC QN AUTO: 29.5 PG (ref 27–31)
MCHC RBC AUTO-ENTMCNC: 31.8 G/DL (ref 32–36)
MCV RBC AUTO: 92.7 FL (ref 80–96)
METHICILLIN RESISTANT STAPHYLOCOCCUS AUREUS: NEGATIVE
MONOCYTES # BLD AUTO: 1.2 K/UL (ref 0–0.8)
MONOCYTES NFR BLD AUTO: 7.4 % (ref 2–6)
MPC BLD CALC-MCNC: 10.4 FL (ref 9.4–12.4)
NEUTROPHILS # BLD AUTO: 14.25 K/UL (ref 1.8–7.7)
NEUTROPHILS NFR BLD AUTO: 88 % (ref 53–65)
NRBC # BLD AUTO: 0 X10E3/UL
NRBC, AUTO (.00): 0 %
PHOSPHATE SERPL-MCNC: 2.9 MG/DL (ref 2.5–4.5)
PLATELET # BLD AUTO: 235 K/UL (ref 150–400)
POTASSIUM SERPL-SCNC: 3.4 MMOL/L (ref 3.5–5.1)
RBC # BLD AUTO: 3.86 M/UL (ref 4.6–6.2)
SODIUM SERPL-SCNC: 137 MMOL/L (ref 136–145)
WBC # BLD AUTO: 16.18 K/UL (ref 4.5–11)

## 2024-11-12 PROCEDURE — 25000003 PHARM REV CODE 250

## 2024-11-12 PROCEDURE — 84145 PROCALCITONIN (PCT): CPT | Performed by: HOSPITALIST

## 2024-11-12 PROCEDURE — 99233 SBSQ HOSP IP/OBS HIGH 50: CPT | Mod: GC,,, | Performed by: HOSPITALIST

## 2024-11-12 PROCEDURE — 36415 COLL VENOUS BLD VENIPUNCTURE: CPT

## 2024-11-12 PROCEDURE — 87205 SMEAR GRAM STAIN: CPT | Performed by: HOSPITALIST

## 2024-11-12 PROCEDURE — 63600175 PHARM REV CODE 636 W HCPCS: Performed by: HOSPITALIST

## 2024-11-12 PROCEDURE — 83735 ASSAY OF MAGNESIUM: CPT

## 2024-11-12 PROCEDURE — 85025 COMPLETE CBC W/AUTO DIFF WBC: CPT

## 2024-11-12 PROCEDURE — 94640 AIRWAY INHALATION TREATMENT: CPT

## 2024-11-12 PROCEDURE — 99900035 HC TECH TIME PER 15 MIN (STAT)

## 2024-11-12 PROCEDURE — 87116 MYCOBACTERIA CULTURE: CPT | Performed by: HOSPITALIST

## 2024-11-12 PROCEDURE — 27000221 HC OXYGEN, UP TO 24 HOURS

## 2024-11-12 PROCEDURE — 87206 SMEAR FLUORESCENT/ACID STAI: CPT | Performed by: HOSPITALIST

## 2024-11-12 PROCEDURE — 25000003 PHARM REV CODE 250: Performed by: HOSPITALIST

## 2024-11-12 PROCEDURE — 11000001 HC ACUTE MED/SURG PRIVATE ROOM

## 2024-11-12 PROCEDURE — 94761 N-INVAS EAR/PLS OXIMETRY MLT: CPT

## 2024-11-12 PROCEDURE — 25000242 PHARM REV CODE 250 ALT 637 W/ HCPCS: Performed by: INTERNAL MEDICINE

## 2024-11-12 PROCEDURE — 80048 BASIC METABOLIC PNL TOTAL CA: CPT

## 2024-11-12 PROCEDURE — 84100 ASSAY OF PHOSPHORUS: CPT

## 2024-11-12 PROCEDURE — 63600175 PHARM REV CODE 636 W HCPCS: Mod: JZ,JG

## 2024-11-12 RX ADMIN — ASPIRIN 81 MG: 81 TABLET, COATED ORAL at 09:11

## 2024-11-12 RX ADMIN — DILTIAZEM HYDROCHLORIDE 240 MG: 120 CAPSULE, COATED, EXTENDED RELEASE ORAL at 09:11

## 2024-11-12 RX ADMIN — IPRATROPIUM BROMIDE AND ALBUTEROL SULFATE 3 ML: .5; 3 SOLUTION RESPIRATORY (INHALATION) at 07:11

## 2024-11-12 RX ADMIN — VANCOMYCIN HYDROCHLORIDE 1750 MG: 1 INJECTION, POWDER, LYOPHILIZED, FOR SOLUTION INTRAVENOUS at 11:11

## 2024-11-12 RX ADMIN — BUDESONIDE 0.5 MG: 0.5 INHALANT RESPIRATORY (INHALATION) at 07:11

## 2024-11-12 RX ADMIN — THERA TABS 1 TABLET: TAB at 09:11

## 2024-11-12 RX ADMIN — IPRATROPIUM BROMIDE AND ALBUTEROL SULFATE 3 ML: .5; 3 SOLUTION RESPIRATORY (INHALATION) at 01:11

## 2024-11-12 RX ADMIN — AZTREONAM 2000 MG: 2 INJECTION, POWDER, LYOPHILIZED, FOR SOLUTION INTRAMUSCULAR; INTRAVENOUS at 12:11

## 2024-11-12 RX ADMIN — ALLOPURINOL 100 MG: 100 TABLET ORAL at 09:11

## 2024-11-12 RX ADMIN — AZTREONAM 2000 MG: 2 INJECTION, POWDER, LYOPHILIZED, FOR SOLUTION INTRAMUSCULAR; INTRAVENOUS at 07:11

## 2024-11-12 RX ADMIN — CHOLECALCIFEROL TAB 125 MCG (5000 UNIT) 5000 UNITS: 125 TAB at 09:11

## 2024-11-12 RX ADMIN — VANCOMYCIN HYDROCHLORIDE 1750 MG: 1 INJECTION, POWDER, LYOPHILIZED, FOR SOLUTION INTRAVENOUS at 05:11

## 2024-11-12 RX ADMIN — PANTOPRAZOLE SODIUM 40 MG: 40 TABLET, DELAYED RELEASE ORAL at 09:11

## 2024-11-12 RX ADMIN — AZTREONAM 2000 MG: 2 INJECTION, POWDER, LYOPHILIZED, FOR SOLUTION INTRAMUSCULAR; INTRAVENOUS at 03:11

## 2024-11-12 RX ADMIN — ACETYLCYSTEINE 4 ML: 200 SOLUTION ORAL; RESPIRATORY (INHALATION) at 01:11

## 2024-11-12 RX ADMIN — IPRATROPIUM BROMIDE AND ALBUTEROL SULFATE 3 ML: .5; 3 SOLUTION RESPIRATORY (INHALATION) at 12:11

## 2024-11-12 RX ADMIN — ACETAMINOPHEN 650 MG: 325 TABLET ORAL at 05:11

## 2024-11-12 RX ADMIN — METOPROLOL SUCCINATE 25 MG: 25 TABLET, EXTENDED RELEASE ORAL at 09:11

## 2024-11-12 RX ADMIN — AZITHROMYCIN MONOHYDRATE 500 MG: 500 INJECTION, POWDER, LYOPHILIZED, FOR SOLUTION INTRAVENOUS at 07:11

## 2024-11-12 NOTE — PLAN OF CARE
Problem: Adult Inpatient Plan of Care  Goal: Plan of Care Review  Outcome: Progressing  Goal: Patient-Specific Goal (Individualized)  Outcome: Progressing  Goal: Absence of Hospital-Acquired Illness or Injury  Outcome: Progressing  Goal: Optimal Comfort and Wellbeing  Outcome: Progressing  Goal: Readiness for Transition of Care  Outcome: Progressing     Problem: Sepsis/Septic Shock  Goal: Optimal Coping  Outcome: Progressing  Goal: Absence of Bleeding  Outcome: Progressing  Goal: Blood Glucose Level Within Targeted Range  Outcome: Progressing  Goal: Absence of Infection Signs and Symptoms  Outcome: Progressing  Goal: Optimal Nutrition Intake  Outcome: Progressing     Problem: Pneumonia  Goal: Fluid Balance  Outcome: Progressing  Goal: Resolution of Infection Signs and Symptoms  Outcome: Progressing  Goal: Effective Oxygenation and Ventilation  Outcome: Progressing     Problem: Gas Exchange Impaired  Goal: Optimal Gas Exchange  Outcome: Progressing

## 2024-11-12 NOTE — ASSESSMENT & PLAN NOTE
This patient does have evidence of infective focus  My overall impression is sepsis.  Source: Respiratory  Antibiotics given-   Antibiotics (72h ago, onward)      Start     Stop Route Frequency Ordered    11/11/24 1100  vancomycin (VANCOCIN) 1,750 mg in D5W 500 mL IVPB         -- IV Every 18 hours 11/11/24 0929 11/11/24 1029  vancomycin - pharmacy to dose         -- IV pharmacy to manage frequency 11/11/24 0929    11/1945  azithromycin (ZITHROMAX) 500 mg in D5W 250 mL IVPB         -- IV Every 24 hours (non-standard times) 11/10/24 1833    11/1945  aztreonam injection 2,000 mg         -- IV Every 8 hours (non-standard times) 11/10/24 1838          Latest lactate reviewed-  Recent Labs   Lab 11/10/24  1629   LACTATE 1.3       Organ dysfunction indicated by  none    Fluid challenge Not needed - patient is not hypotensive      Post- resuscitation assessment No - Post resuscitation assessment not needed       Will Not start Pressors- Levophed for MAP of 65  Source control achieved by: cultures and antibiotics     11/12-Discharge was offered as an option for today, patient would like to stay another day to receive additional IV antibiotics.

## 2024-11-12 NOTE — PLAN OF CARE
Problem: Gas Exchange Impaired  Goal: Optimal Gas Exchange  Outcome: Progressing  Intervention: Optimize Oxygenation and Ventilation  Flowsheets (Taken 11/12/2024 9612)  Airway/Ventilation Management: pulmonary hygiene promoted     Problem: Pneumonia  Goal: Fluid Balance  Outcome: Progressing  Goal: Resolution of Infection Signs and Symptoms  Outcome: Progressing  Goal: Effective Oxygenation and Ventilation  Outcome: Progressing

## 2024-11-12 NOTE — PROGRESS NOTES
[facility] - [address] [place] [address] Medicine  Progress Note    Patient Name: Gris Hobbs  MRN: [id number]  Patient Class: IP- Inpatient   Admission Date: 11/10/2024  Length of Stay: 2 days  Attending Physician: Yamile Way MD  Primary Care Provider: Yobany Marsh MD        Subjective:     Principal Problem:Sepsis        HPI:  78 yo male present to [facility] c/o chills and a productive cough. He has a PMH of A.fib, CAD, HTN, GERD. Pt stated that his symptoms has been progressively worse over the past few week, but over the past several months, he's had chronic cough and congestion. He was seen by ENT for nasal congestion and  his PCP on 10/28/2024 for exacerbation of his underline lung disease. He was treated with steriods and antibiotics. Pt saw his pulmonologist on 11/7/2024. Chart review notes revealed pulmonary function tests significant for obstructive lung disease, without bronchodilator effect, most likely due to underlying reactive airway disease/asthma. Patient endorses minimal SOB, chills, fever, Pt currently denies headache, chest pain, or lower limb edema.    In ED pt was given 1L NaCl, blood cultures X2 drawn and started on Clindamycin IV. CXR done today showed: coarse interstitial attenuation suggests edema or other nonspecific pneumonitis. There is a more rounded focus of consolidation within the right lower lung zone, could reflect infectious consolidation given short-term development since exam 05/24/2024 however malignancy is not excluded.     Echocardiogram  at [facility] 1/30/2024 was normal. Around this time he had watchman procedure.    Lives with family. Ambulates independently without limitations.     12/16/2022 he had cholecystectomy       Overview/Hospital Course:  No notes on file    Interval History: Patient was observed sitting in bed, awake and alert, with improvement in his respiratory symptoms. During the night, he experienced some confusion  and expressed a desire to leave AMA. However, after a discussion with both the patient and his wife this morning, he agreed to remain for an additional day to allow for further optimization of treatment and management.      Review of Systems   Constitutional:  Negative for fever.   HENT:  Negative for sneezing.    Eyes:  Negative for visual disturbance.   Respiratory:  Positive for cough. Negative for wheezing.    Cardiovascular:  Negative for chest pain, palpitations and leg swelling.   Gastrointestinal:  Positive for constipation. Negative for abdominal pain and diarrhea.   Neurological:  Negative for dizziness, light-headedness and headaches.     Objective:     Vital Signs (Most Recent):  Temp: 98 °F (36.7 °C) (11/12/24 1216)  Pulse: 80 (11/12/24 1323)  Resp: 18 (11/12/24 1323)  BP: 123/74 (11/12/24 1216)  SpO2: 95 % (11/12/24 1319) Vital Signs (24h Range):  Temp:  [97.8 °F (36.6 °C)-99 °F (37.2 °C)] 98 °F (36.7 °C)  Pulse:  [] 80  Resp:  [15-24] 18  SpO2:  [95 %-98 %] 95 %  BP: (123-137)/(67-83) 123/74     Weight: 86.7 kg (191 lb 3.2 oz)  Body mass index is 23.9 kg/m².    Intake/Output Summary (Last 24 hours) at 11/12/2024 1549  Last data filed at 11/12/2024 0628  Gross per 24 hour   Intake 1255.44 ml   Output --   Net 1255.44 ml         Physical Exam  Vitals and nursing note reviewed. Exam conducted with a chaperone present.   Constitutional:       General: He is awake. He is not in acute distress.     Appearance: Normal appearance. He is well-developed, well-groomed and overweight. He is not toxic-appearing or diaphoretic.      Interventions: Nasal cannula in place.   HENT:      Head: Normocephalic and atraumatic.      Mouth/Throat:      Pharynx: Oropharynx is clear.   Eyes:      Conjunctiva/sclera: Conjunctivae normal.   Cardiovascular:      Rate and Rhythm: Rhythm irregular.      Heart sounds: Normal heart sounds.   Pulmonary:      Effort: No respiratory distress.      Breath sounds: Rhonchi present.  No wheezing.   Abdominal:      Palpations: Abdomen is soft.   Musculoskeletal:      Cervical back: Normal range of motion and neck supple.      Right lower leg: No edema.      Left lower leg: No edema.   Skin:     General: Skin is warm and dry.   Neurological:      Mental Status: He is alert and oriented to person, place, and time.   Psychiatric:         Mood and Affect: Mood normal.         Behavior: Behavior normal. Behavior is cooperative.         Thought Content: Thought content normal.         Judgment: Judgment normal.             Significant Labs: All pertinent labs within the past 24 hours have been reviewed.    Significant Imaging: I have reviewed all pertinent imaging results/findings within the past 24 hours.    Assessment/Plan:      * Sepsis  This patient does have evidence of infective focus  My overall impression is sepsis.  Source: Respiratory  Antibiotics given-   Antibiotics (72h ago, onward)      Start     Stop Route Frequency Ordered    11/11/24 1100  vancomycin (VANCOCIN) 1,750 mg in D5W 500 mL IVPB         -- IV Every 18 hours 11/11/24 0929    11/11/24 1029  vancomycin - pharmacy to dose         -- IV pharmacy to manage frequency 11/11/24 0929    11/1945  azithromycin (ZITHROMAX) 500 mg in D5W 250 mL IVPB         -- IV Every 24 hours (non-standard times) 11/10/24 1833    11/1945  aztreonam injection 2,000 mg         -- IV Every 8 hours (non-standard times) 11/10/24 1838          Latest lactate reviewed-  Recent Labs   Lab 11/10/24  1629   LACTATE 1.3       Organ dysfunction indicated by  none    Fluid challenge Not needed - patient is not hypotensive      Post- resuscitation assessment No - Post resuscitation assessment not needed       Will Not start Pressors- Levophed for MAP of 65  Source control achieved by: cultures and antibiotics     11/12-Discharge was offered as an option for today, patient would like to stay another day to receive additional IV antibiotics.     Pneumonia of  right lower lobe due to infectious organism  Patient has a diagnosis of pneumonia. The cause of the pneumonia is suspected to be bacterial in etiology but organism is not known. The pneumonia is stable. The patient has the following signs/symptoms of pneumonia: cough, sputum production, and shortness of breath. The patient does not have a current oxygen requirement and the patient does not have a home oxygen requirement. I have reviewed the pertinent imaging. The following cultures have been collected: Blood cultures The culture results are listed below.     Current antimicrobial regimen consists of the antibiotics listed below. Will monitor patient closely and continue current treatment plan unchanged.    Antibiotics (From admission, onward)      Start     Stop Route Frequency Ordered    11/11/24 1100  vancomycin (VANCOCIN) 1,750 mg in D5W 500 mL IVPB         -- IV Every 18 hours 11/11/24 0929 11/11/24 1029  vancomycin - pharmacy to dose         -- IV pharmacy to manage frequency 11/11/24 0929    11/1945  azithromycin (ZITHROMAX) 500 mg in D5W 250 mL IVPB         -- IV Every 24 hours (non-standard times) 11/10/24 1833    11/1945  aztreonam injection 2,000 mg         -- IV Every 8 hours (non-standard times) 11/10/24 1838            Microbiology Results (last 7 days)       Procedure Component Value Units Date/Time    Culture, Lower Respiratory [[id number]] Collected: 11/12/24 1217    Order Status: Completed Specimen: Respiratory from Sputum Induced Updated: 11/12/24 1356     Gram Stain Result Few WBC observed      Moderate Epithelial cells      Many Gram positive cocci      Moderate Yeast    Culture, AFB [[id number]] Collected: 11/12/24 1217    Order Status: Sent Specimen: Respiratory from Sputum Expectorated Updated: 11/12/24 1248    AFB Smear Only [[id number]] Collected: 11/12/24 1217    Order Status: Canceled Specimen: Respiratory from Sputum Induced Updated: 11/12/24 1248    AFB Smear Only  [[id number]] Collected: 11/12/24 1217    Order Status: Canceled Specimen: Respiratory from Sputum Induced Updated: 11/12/24 1248    AFB Smear Only [[id number]] Collected: 11/12/24 1217    Order Status: Sent Specimen: Respiratory from Sputum Induced Updated: 11/12/24 1248    Blood culture x two cultures. Draw prior to antibiotics [[id number]] Collected: 11/10/24 1342    Order Status: Completed Specimen: Blood from Peripheral, Hand, Left Updated: 11/12/24 0656     Culture, Blood No Growth At 24 Hours    Blood culture x two cultures. Draw prior to antibiotics [[id number]] Collected: 11/10/24 1337    Order Status: Completed Specimen: Blood from Peripheral, Forearm, Left Updated: 11/12/24 0656     Culture, Blood No Growth At 24 Hours    Culture, Lower Respiratory [[id number]]     Order Status: Canceled Specimen: Respiratory from Sputum Expectorated     AFB Smear Only [[id number]]     Order Status: Canceled Specimen: Respiratory from Sputum Expectorated     Influenza A & B by Molecular [[id number]]  (Normal) Collected: 11/10/24 1307    Order Status: Completed Specimen: Nasopharyngeal Swab Updated: 11/10/24 1421     INFLUENZA A MOLECULAR Negative     INFLUENZA B MOLECULAR  Negative        -Respiratory panel - negative for all included organisms.     -CTA:   Allowing for motion artifact, no convincing pulmonary thromboembolism.  Multifocal patchy regions of tree-in-bud type nodularity/consolidation primarily involving the right hemithorax, concerning for multifocal infectious process.  There is bilateral peribronchial thickening involving the airways to the lower lobes suggesting bronchial airways inflammation or infection.      Paroxysmal atrial fibrillation  Patient has long standing persistent (>12 months) atrial fibrillation. Patient is currently in atrial fibrillation. [id number] Score: 3. The patients heart rate in the last 24 hours is as follows:  Pulse  Min: 79  Max: 113     Antiarrhythmics  diltiaZEM 24 hr capsule  240 mg, Daily, Oral  metoprolol succinate (TOPROL-XL) 24 hr tablet 25 mg, Daily, Oral    Anticoagulants   none    Plan  - Replete lytes with a goal of K>4, Mg >2  - Patient is not anticoagulated due to watchman  - Patient's afib is currently controlled  - telemetry        Coronary artery disease involving native coronary artery of native heart without angina pectoris  Patient with known CAD, which is controlled Will continue ASA and monitor for S/Sx of angina/ACS. Continue to monitor on telemetry.     Gastroesophageal reflux disease  -continue home Protonix        VTE Risk Mitigation (From admission, onward)           Ordered     IP VTE HIGH RISK PATIENT  Once         11/10/24 1841     Place sequential compression device  Until discontinued         11/10/24 1841                    Discharge Planning   EN: 11/12/2024     Code Status: Full Code   Is the patient medically ready for discharge?:     Reason for patient still in hospital (select all that apply): Patient trending condition, Laboratory test, and Treatment  Discharge Plan A: Home with family                  Sherine Escalera MD  Department of Hospital Medicine   [facility] - [address] [place] [address]

## 2024-11-12 NOTE — PROGRESS NOTES
[facility] - [facility] [address] [facility] Telemetry  Wound Care    Patient Name:  Gris Hobbs   MRN:  [id number]  Date: 11/12/2024  Diagnosis: Sepsis    History:     Past Medical History:   Diagnosis Date    Asthma     Atrial fibrillation     Coronary artery disease involving native coronary artery of native heart without angina pectoris     GERD (gastroesophageal reflux disease)     Gout     Hyperlipidemia     Hypertension     Ischemic cardiomyopathy     NSTEMI (non-ST elevated myocardial infarction) 10/2019    Presence of Watchman left atrial appendage closure device     2024       Social History     Socioeconomic History    Marital status:    Tobacco Use    Smoking status: Never     Passive exposure: Never    Smokeless tobacco: Never   Substance and Sexual Activity    Alcohol use: Never    Drug use: Never    Sexual activity: Not Currently     Social Drivers of Health     Financial Resource Strain: Low Risk  (11/10/2024)    Overall Financial Resource Strain (CARDIA)     Difficulty of Paying Living Expenses: Not very hard   Food Insecurity: No Food Insecurity (11/10/2024)    Hunger Vital Sign     Worried About Running Out of Food in the Last Year: Never true     Ran Out of Food in the Last Year: Never true   Transportation Needs: No Transportation Needs (11/10/2024)    TRANSPORTATION NEEDS     Transportation : No   Stress: No Stress Concern Present (11/10/2024)    [employer] - Occupational Stress Questionnaire     Feeling of Stress : Only a little   Housing Stability: Low Risk  (11/10/2024)    Housing Stability Vital Sign     Unable to Pay for Housing in the Last Year: No     Homeless in the Last Year: No       Precautions:     Allergies as of 11/10/2024 - Reviewed 11/10/2024   Allergen Reaction Noted    Pcn [penicillins]  05/24/2021       WOC Assessment Details/Treatment     Narrative: Seen patient for initiation of preventative skin care measures    Patient in bed, Alert.  States acral is ok. Heels ok, no redness noted.   Hilton score 19    Consult wound care for any skin issues       11/12/2024

## 2024-11-12 NOTE — PLAN OF CARE
Problem: Adult Inpatient Plan of Care  Goal: Plan of Care Review  Outcome: Progressing  Goal: Patient-Specific Goal (Individualized)  Outcome: Progressing  Goal: Absence of Hospital-Acquired Illness or Injury  Outcome: Progressing  Goal: Optimal Comfort and Wellbeing  Outcome: Progressing  Goal: Readiness for Transition of Care  Outcome: Progressing     Problem: Pneumonia  Goal: Fluid Balance  Outcome: Progressing  Goal: Resolution of Infection Signs and Symptoms  Outcome: Progressing  Goal: Effective Oxygenation and Ventilation  Outcome: Progressing     Problem: Gas Exchange Impaired  Goal: Optimal Gas Exchange  Outcome: Progressing

## 2024-11-12 NOTE — ASSESSMENT & PLAN NOTE
Patient has long standing persistent (>12 months) atrial fibrillation. Patient is currently in atrial fibrillation. [id number] Score: 3. The patients heart rate in the last 24 hours is as follows:  Pulse  Min: 79  Max: 113     Antiarrhythmics  diltiaZEM 24 hr capsule 240 mg, Daily, Oral  metoprolol succinate (TOPROL-XL) 24 hr tablet 25 mg, Daily, Oral    Anticoagulants   none    Plan  - Replete lytes with a goal of K>4, Mg >2  - Patient is not anticoagulated due to watchman  - Patient's afib is currently controlled  - telemetry

## 2024-11-12 NOTE — SUBJECTIVE & OBJECTIVE
Interval History: Patient was observed sitting in bed, awake and alert, with improvement in his respiratory symptoms. During the night, he experienced some confusion and expressed a desire to leave AMA. However, after a discussion with both the patient and his wife this morning, he agreed to remain for an additional day to allow for further optimization of treatment and management.      Review of Systems   Constitutional:  Negative for fever.   HENT:  Negative for sneezing.    Eyes:  Negative for visual disturbance.   Respiratory:  Positive for cough. Negative for wheezing.    Cardiovascular:  Negative for chest pain, palpitations and leg swelling.   Gastrointestinal:  Positive for constipation. Negative for abdominal pain and diarrhea.   Neurological:  Negative for dizziness, light-headedness and headaches.     Objective:     Vital Signs (Most Recent):  Temp: 98 °F (36.7 °C) (11/12/24 1216)  Pulse: 80 (11/12/24 1323)  Resp: 18 (11/12/24 1323)  BP: 123/74 (11/12/24 1216)  SpO2: 95 % (11/12/24 1319) Vital Signs (24h Range):  Temp:  [97.8 °F (36.6 °C)-99 °F (37.2 °C)] 98 °F (36.7 °C)  Pulse:  [] 80  Resp:  [15-24] 18  SpO2:  [95 %-98 %] 95 %  BP: (123-137)/(67-83) 123/74     Weight: 86.7 kg (191 lb 3.2 oz)  Body mass index is 23.9 kg/m².    Intake/Output Summary (Last 24 hours) at 11/12/2024 1549  Last data filed at 11/12/2024 0628  Gross per 24 hour   Intake 1255.44 ml   Output --   Net 1255.44 ml         Physical Exam  Vitals and nursing note reviewed. Exam conducted with a chaperone present.   Constitutional:       General: He is awake. He is not in acute distress.     Appearance: Normal appearance. He is well-developed, well-groomed and overweight. He is not toxic-appearing or diaphoretic.      Interventions: Nasal cannula in place.   HENT:      Head: Normocephalic and atraumatic.      Mouth/Throat:      Pharynx: Oropharynx is clear.   Eyes:      Conjunctiva/sclera: Conjunctivae normal.   Cardiovascular:       Rate and Rhythm: Rhythm irregular.      Heart sounds: Normal heart sounds.   Pulmonary:      Effort: No respiratory distress.      Breath sounds: Rhonchi present. No wheezing.   Abdominal:      Palpations: Abdomen is soft.   Musculoskeletal:      Cervical back: Normal range of motion and neck supple.      Right lower leg: No edema.      Left lower leg: No edema.   Skin:     General: Skin is warm and dry.   Neurological:      Mental Status: He is alert and oriented to person, place, and time.   Psychiatric:         Mood and Affect: Mood normal.         Behavior: Behavior normal. Behavior is cooperative.         Thought Content: Thought content normal.         Judgment: Judgment normal.             Significant Labs: All pertinent labs within the past 24 hours have been reviewed.    Significant Imaging: I have reviewed all pertinent imaging results/findings within the past 24 hours.

## 2024-11-13 LAB
ANION GAP SERPL CALCULATED.3IONS-SCNC: 7 MMOL/L (ref 7–16)
BASOPHILS # BLD AUTO: 0.04 K/UL (ref 0–0.2)
BASOPHILS NFR BLD AUTO: 0.3 % (ref 0–1)
BUN SERPL-MCNC: 18 MG/DL (ref 7–18)
BUN/CREAT SERPL: 22 (ref 6–20)
CALCIUM SERPL-MCNC: 8.5 MG/DL (ref 8.5–10.1)
CHLORIDE SERPL-SCNC: 105 MMOL/L (ref 98–107)
CO2 SERPL-SCNC: 27 MMOL/L (ref 21–32)
CREAT SERPL-MCNC: 0.82 MG/DL (ref 0.7–1.3)
DIFFERENTIAL METHOD BLD: ABNORMAL
EGFR (NO RACE VARIABLE) (RUSH/TITUS): 89 ML/MIN/1.73M2
EOSINOPHIL # BLD AUTO: 0.09 K/UL (ref 0–0.5)
EOSINOPHIL NFR BLD AUTO: 0.8 % (ref 1–4)
ERYTHROCYTE [DISTWIDTH] IN BLOOD BY AUTOMATED COUNT: 14.3 % (ref 11.5–14.5)
GAMMA INTERFERON BACKGROUND BLD IA-ACNC: 0.03 [IU]/ML
GLUCOSE SERPL-MCNC: 144 MG/DL (ref 74–106)
HCT VFR BLD AUTO: 34.3 % (ref 40–54)
HGB BLD-MCNC: 11 G/DL (ref 13.5–18)
IMM GRANULOCYTES # BLD AUTO: 0.1 K/UL (ref 0–0.04)
IMM GRANULOCYTES NFR BLD: 0.9 % (ref 0–0.4)
LYMPHOCYTES # BLD AUTO: 0.64 K/UL (ref 1–4.8)
LYMPHOCYTES NFR BLD AUTO: 5.5 % (ref 27–41)
M TB IFN-G CD4+ BCKGRND COR BLD-ACNC: 0 [IU]/ML
MAGNESIUM SERPL-MCNC: 2 MG/DL (ref 1.7–2.3)
MCH RBC QN AUTO: 29.8 PG (ref 27–31)
MCHC RBC AUTO-ENTMCNC: 32.1 G/DL (ref 32–36)
MCV RBC AUTO: 93 FL (ref 80–96)
MITOGEN IGNF BCKGRD COR BLD-ACNC: 4.42 [IU]/ML
MONOCYTES # BLD AUTO: 1 K/UL (ref 0–0.8)
MONOCYTES NFR BLD AUTO: 8.7 % (ref 2–6)
MPC BLD CALC-MCNC: 10.1 FL (ref 9.4–12.4)
NEUTROPHILS # BLD AUTO: 9.69 K/UL (ref 1.8–7.7)
NEUTROPHILS NFR BLD AUTO: 83.8 % (ref 53–65)
NRBC # BLD AUTO: 0 X10E3/UL
NRBC, AUTO (.00): 0 %
PHOSPHATE SERPL-MCNC: 3 MG/DL (ref 2.5–4.5)
PLATELET # BLD AUTO: 230 K/UL (ref 150–400)
POTASSIUM SERPL-SCNC: 3.4 MMOL/L (ref 3.5–5.1)
QUANTIFERON-TB GOLD PLUS RESULT: NEGATIVE
RBC # BLD AUTO: 3.69 M/UL (ref 4.6–6.2)
SODIUM SERPL-SCNC: 136 MMOL/L (ref 136–145)
TB2 AG MINUS NIL RESULT: 0
VANCOMYCIN TROUGH SERPL-MCNC: 12.3 ΜG/ML (ref 15–20)
WBC # BLD AUTO: 11.56 K/UL (ref 4.5–11)

## 2024-11-13 PROCEDURE — 94640 AIRWAY INHALATION TREATMENT: CPT

## 2024-11-13 PROCEDURE — 84100 ASSAY OF PHOSPHORUS: CPT

## 2024-11-13 PROCEDURE — 85025 COMPLETE CBC W/AUTO DIFF WBC: CPT

## 2024-11-13 PROCEDURE — 25000242 PHARM REV CODE 250 ALT 637 W/ HCPCS: Performed by: INTERNAL MEDICINE

## 2024-11-13 PROCEDURE — 36415 COLL VENOUS BLD VENIPUNCTURE: CPT

## 2024-11-13 PROCEDURE — 63600175 PHARM REV CODE 636 W HCPCS: Performed by: HOSPITALIST

## 2024-11-13 PROCEDURE — 63700000 PHARM REV CODE 250 ALT 637 W/O HCPCS: Performed by: HOSPITALIST

## 2024-11-13 PROCEDURE — 80048 BASIC METABOLIC PNL TOTAL CA: CPT

## 2024-11-13 PROCEDURE — 63600175 PHARM REV CODE 636 W HCPCS: Mod: JZ,JG

## 2024-11-13 PROCEDURE — 83735 ASSAY OF MAGNESIUM: CPT

## 2024-11-13 PROCEDURE — 25000003 PHARM REV CODE 250

## 2024-11-13 PROCEDURE — 94761 N-INVAS EAR/PLS OXIMETRY MLT: CPT

## 2024-11-13 PROCEDURE — 11000001 HC ACUTE MED/SURG PRIVATE ROOM

## 2024-11-13 PROCEDURE — 99233 SBSQ HOSP IP/OBS HIGH 50: CPT | Mod: GC,,, | Performed by: HOSPITALIST

## 2024-11-13 PROCEDURE — 99900035 HC TECH TIME PER 15 MIN (STAT)

## 2024-11-13 PROCEDURE — 80202 ASSAY OF VANCOMYCIN: CPT | Performed by: HOSPITALIST

## 2024-11-13 PROCEDURE — 27000221 HC OXYGEN, UP TO 24 HOURS

## 2024-11-13 PROCEDURE — 25000003 PHARM REV CODE 250: Performed by: HOSPITALIST

## 2024-11-13 RX ORDER — AZITHROMYCIN 250 MG/1
500 TABLET, FILM COATED ORAL DAILY
Status: DISCONTINUED | OUTPATIENT
Start: 2024-11-13 | End: 2024-11-14 | Stop reason: HOSPADM

## 2024-11-13 RX ADMIN — THERA TABS 1 TABLET: TAB at 09:11

## 2024-11-13 RX ADMIN — BUDESONIDE 0.5 MG: 0.5 INHALANT RESPIRATORY (INHALATION) at 07:11

## 2024-11-13 RX ADMIN — METOPROLOL SUCCINATE 25 MG: 25 TABLET, EXTENDED RELEASE ORAL at 09:11

## 2024-11-13 RX ADMIN — AZTREONAM 2000 MG: 2 INJECTION, POWDER, LYOPHILIZED, FOR SOLUTION INTRAMUSCULAR; INTRAVENOUS at 02:11

## 2024-11-13 RX ADMIN — ACETYLCYSTEINE 4 ML: 200 SOLUTION ORAL; RESPIRATORY (INHALATION) at 07:11

## 2024-11-13 RX ADMIN — IPRATROPIUM BROMIDE AND ALBUTEROL SULFATE 3 ML: .5; 3 SOLUTION RESPIRATORY (INHALATION) at 01:11

## 2024-11-13 RX ADMIN — AZTREONAM 2000 MG: 2 INJECTION, POWDER, LYOPHILIZED, FOR SOLUTION INTRAMUSCULAR; INTRAVENOUS at 11:11

## 2024-11-13 RX ADMIN — IPRATROPIUM BROMIDE AND ALBUTEROL SULFATE 3 ML: .5; 3 SOLUTION RESPIRATORY (INHALATION) at 07:11

## 2024-11-13 RX ADMIN — DILTIAZEM HYDROCHLORIDE 240 MG: 120 CAPSULE, COATED, EXTENDED RELEASE ORAL at 09:11

## 2024-11-13 RX ADMIN — AZITHROMYCIN DIHYDRATE 500 MG: 250 TABLET ORAL at 08:11

## 2024-11-13 RX ADMIN — ACETYLCYSTEINE 4 ML: 200 SOLUTION ORAL; RESPIRATORY (INHALATION) at 01:11

## 2024-11-13 RX ADMIN — ALLOPURINOL 100 MG: 100 TABLET ORAL at 09:11

## 2024-11-13 RX ADMIN — PANTOPRAZOLE SODIUM 40 MG: 40 TABLET, DELAYED RELEASE ORAL at 09:11

## 2024-11-13 RX ADMIN — VANCOMYCIN HYDROCHLORIDE 1750 MG: 1 INJECTION, POWDER, LYOPHILIZED, FOR SOLUTION INTRAVENOUS at 04:11

## 2024-11-13 RX ADMIN — IPRATROPIUM BROMIDE AND ALBUTEROL SULFATE 3 ML: .5; 3 SOLUTION RESPIRATORY (INHALATION) at 12:11

## 2024-11-13 RX ADMIN — BUDESONIDE 0.5 MG: 0.5 INHALANT RESPIRATORY (INHALATION) at 08:11

## 2024-11-13 RX ADMIN — ASPIRIN 81 MG: 81 TABLET, COATED ORAL at 09:11

## 2024-11-13 RX ADMIN — CHOLECALCIFEROL TAB 125 MCG (5000 UNIT) 5000 UNITS: 125 TAB at 09:11

## 2024-11-13 RX ADMIN — AZTREONAM 2000 MG: 2 INJECTION, POWDER, LYOPHILIZED, FOR SOLUTION INTRAMUSCULAR; INTRAVENOUS at 08:11

## 2024-11-13 NOTE — ASSESSMENT & PLAN NOTE
Patient has a diagnosis of pneumonia. The cause of the pneumonia is suspected to be bacterial in etiology but organism is not known. The pneumonia is stable. The patient has the following signs/symptoms of pneumonia: cough, sputum production, and shortness of breath. The patient does not have a current oxygen requirement and the patient does not have a home oxygen requirement. I have reviewed the pertinent imaging. The following cultures have been collected: Blood cultures The culture results are listed below.     Current antimicrobial regimen consists of the antibiotics listed below. Will monitor patient closely and continue current treatment plan unchanged.    Antibiotics (From admission, onward)      Start     Stop Route Frequency Ordered    11/13/24 2100  azithromycin tablet 500 mg         -- Oral Daily 11/13/24 0840    11/11/24 1100  vancomycin (VANCOCIN) 1,750 mg in D5W 500 mL IVPB         -- IV Every 18 hours 11/11/24 0929    11/11/24 1029  vancomycin - pharmacy to dose         -- IV pharmacy to manage frequency 11/11/24 0929    11/1945  aztreonam injection 2,000 mg         -- IV Every 8 hours (non-standard times) 11/10/24 1838            Microbiology Results (last 7 days)       Procedure Component Value Units Date/Time    Culture, Lower Respiratory [[id number]] Collected: 11/12/24 1217    Order Status: Completed Specimen: Respiratory from Sputum Induced Updated: 11/13/24 0905     Culture, Lower Respiratory Typical respiratory danya to date     Gram Stain Result Few WBC observed      Moderate Epithelial cells      Many Gram positive cocci      Moderate Yeast    Blood culture x two cultures. Draw prior to antibiotics [[id number]] Collected: 11/10/24 1342    Order Status: Completed Specimen: Blood from Peripheral, Hand, Left Updated: 11/13/24 0723     Culture, Blood No Growth At 48 Hours    Blood culture x two cultures. Draw prior to antibiotics [[id number]] Collected: 11/10/24 1337    Order Status:  Completed Specimen: Blood from Peripheral, Forearm, Left Updated: 11/13/24 0723     Culture, Blood No Growth At 48 Hours    AFB Smear Only [[id number]] Collected: 11/12/24 1217    Order Status: Completed Specimen: Respiratory from Sputum Induced Updated: 11/12/24 2027     AFB Smear No acid fast bacilli seen    Culture, AFB [[id number]] Collected: 11/12/24 1217    Order Status: Resulted Specimen: Respiratory from Sputum Expectorated Updated: 11/12/24 1248    AFB Smear Only [[id number]] Collected: 11/12/24 1217    Order Status: Canceled Specimen: Respiratory from Sputum Induced Updated: 11/12/24 1248    AFB Smear Only [[id number]] Collected: 11/12/24 1217    Order Status: Canceled Specimen: Respiratory from Sputum Induced Updated: 11/12/24 1248    Culture, Lower Respiratory [[id number]]     Order Status: Canceled Specimen: Respiratory from Sputum Expectorated     AFB Smear Only [[id number]]     Order Status: Canceled Specimen: Respiratory from Sputum Expectorated     Influenza A & B by Molecular [[id number]]  (Normal) Collected: 11/10/24 1307    Order Status: Completed Specimen: Nasopharyngeal Swab Updated: 11/10/24 1421     INFLUENZA A MOLECULAR Negative     INFLUENZA B MOLECULAR  Negative        -Respiratory panel - negative for all included organisms.     -CTA:   Allowing for motion artifact, no convincing pulmonary thromboembolism.  Multifocal patchy regions of tree-in-bud type nodularity/consolidation primarily involving the right hemithorax, concerning for multifocal infectious process.  There is bilateral peribronchial thickening involving the airways to the lower lobes suggesting bronchial airways inflammation or infection.      11/13   -Lower Respiratory cultures -showed many gram positive cocci   -Sputum sample smear was negative for acid fast bacilli seen   -Quantiferon Gold TB- negative   - Patient continues to have persistent cough. Continue antibiotic therapy

## 2024-11-13 NOTE — PLAN OF CARE
Problem: Adult Inpatient Plan of Care  Goal: Plan of Care Review  Outcome: Progressing  Goal: Patient-Specific Goal (Individualized)  Outcome: Progressing  Goal: Absence of Hospital-Acquired Illness or Injury  Outcome: Progressing  Goal: Optimal Comfort and Wellbeing  Outcome: Progressing  Goal: Readiness for Transition of Care  Outcome: Progressing     Problem: Sepsis/Septic Shock  Goal: Optimal Coping  Outcome: Progressing  Goal: Absence of Bleeding  Outcome: Progressing  Goal: Blood Glucose Level Within Targeted Range  Outcome: Progressing  Goal: Absence of Infection Signs and Symptoms  Outcome: Progressing  Goal: Optimal Nutrition Intake  Outcome: Progressing     Problem: Pneumonia  Goal: Fluid Balance  Outcome: Progressing  Goal: Resolution of Infection Signs and Symptoms  Outcome: Progressing  Goal: Effective Oxygenation and Ventilation  Outcome: Progressing     Problem: Gas Exchange Impaired  Goal: Optimal Gas Exchange  Outcome: Progressing     Problem: Airway Clearance Ineffective  Goal: Effective Airway Clearance  Outcome: Progressing

## 2024-11-13 NOTE — PLAN OF CARE
Problem: Pneumonia  Goal: Fluid Balance  Outcome: Progressing  Goal: Resolution of Infection Signs and Symptoms  Outcome: Progressing  Intervention: Prevent Infection Progression  Flowsheets (Taken 11/13/2024 1749)  Infection Management: aseptic technique maintained  Goal: Effective Oxygenation and Ventilation  Outcome: Progressing     Problem: Airway Clearance Ineffective  Goal: Effective Airway Clearance  Outcome: Progressing  Intervention: Promote Airway Secretion Clearance  Flowsheets (Taken 11/13/2024 1749)  Breathing Techniques/Airway Clearance: deep/controlled cough encouraged  Cough And Deep Breathing: done with encouragement  Activity Management: Sitting at edge of bed - L2

## 2024-11-13 NOTE — SUBJECTIVE & OBJECTIVE
"Interval History: Patient was found sitting in bed receiving a breathing treatment. His wife, who stayed overnight, reported "he was  making some  abnormal noises during the night, which sounded like he was gasping for air. The nurse was called, and a physical exam was conducted, but no significant findings were noted. The patient stated that he feels a little better today, although he continues to have a persistent cough.    Review of Systems   Constitutional:  Negative for fever.   HENT:  Negative for sneezing.    Eyes:  Negative for visual disturbance.   Respiratory:  Positive for cough. Negative for wheezing.    Cardiovascular:  Negative for chest pain, palpitations and leg swelling.   Gastrointestinal:  Positive for constipation. Negative for abdominal pain and diarrhea.   Neurological:  Negative for dizziness, light-headedness and headaches.     Objective:     Vital Signs (Most Recent):  Temp: 98 °F (36.7 °C) (11/13/24 0705)  Pulse: 96 (11/13/24 0805)  Resp: 16 (11/13/24 0805)  BP: 124/84 (11/13/24 0705)  SpO2: 100 % (11/13/24 0805) Vital Signs (24h Range):  Temp:  [97.7 °F (36.5 °C)-98.3 °F (36.8 °C)] 98 °F (36.7 °C)  Pulse:  [74-96] 96  Resp:  [15-20] 16  SpO2:  [92 %-100 %] 100 %  BP: (123-138)/(71-84) 124/84     Weight: 86.7 kg (191 lb 3.2 oz)  Body mass index is 23.9 kg/m².    Intake/Output Summary (Last 24 hours) at 11/13/2024 0852  Last data filed at 11/13/2024 0134  Gross per 24 hour   Intake 921.11 ml   Output --   Net 921.11 ml         Physical Exam  Vitals and nursing note reviewed. Exam conducted with a chaperone present.   Constitutional:       General: He is awake. He is not in acute distress.     Appearance: Normal appearance. He is well-developed, well-groomed and overweight. He is not toxic-appearing or diaphoretic.      Interventions: Nasal cannula in place.   HENT:      Head: Normocephalic and atraumatic.      Mouth/Throat:      Pharynx: Oropharynx is clear.   Eyes:      Conjunctiva/sclera: " Conjunctivae normal.   Cardiovascular:      Rate and Rhythm: Rhythm irregular.      Heart sounds: Normal heart sounds.   Pulmonary:      Effort: No respiratory distress.      Breath sounds: Rhonchi present. No wheezing.   Abdominal:      Palpations: Abdomen is soft.   Musculoskeletal:      Cervical back: Normal range of motion and neck supple.      Right lower leg: No edema.      Left lower leg: No edema.   Skin:     General: Skin is warm and dry.   Neurological:      Mental Status: He is alert and oriented to person, place, and time.   Psychiatric:         Mood and Affect: Mood normal.         Behavior: Behavior normal. Behavior is cooperative.         Thought Content: Thought content normal.         Judgment: Judgment normal.             Significant Labs: All pertinent labs within the past 24 hours have been reviewed.    Significant Imaging: I have reviewed all pertinent imaging results/findings within the past 24 hours.

## 2024-11-13 NOTE — PROGRESS NOTES
Pharmacist Intervention IV to PO Note    Gris Hobbs is a 79 y.o. male being treated with IV medication azithromycin    Patient Data:    Vital Signs (Most Recent):  Temp: 98 °F (36.7 °C) (11/13/24 0705)  Pulse: 96 (11/13/24 0805)  Resp: 16 (11/13/24 0805)  BP: 124/84 (11/13/24 0705)  SpO2: 100 % (11/13/24 0805) Vital Signs (72h Range):  Temp:  [97.7 °F (36.5 °C)-100.1 °F (37.8 °C)]   Pulse:  []   Resp:  [15-32]   BP: (105-149)/(54-84)   SpO2:  [89 %-100 %]      CBC:  Recent Labs   Lab 11/11/24  0434 11/12/24  0451 11/13/24  0430   WBC 18.11* 16.18* 11.56*   RBC 3.96* 3.86* 3.69*   HGB 11.9* 11.4* 11.0*   HCT 36.7* 35.8* 34.3*    235 230   MCV 92.7 92.7 93.0   MCH 30.1 29.5 29.8   MCHC 32.4 31.8* 32.1     CMP:     Recent Labs   Lab 11/10/24  1329 11/11/24  0434 11/12/24  0451 11/13/24  0430   * 126* 152* 144*   CALCIUM 8.9 8.6 8.7 8.5   ALBUMIN 3.4*  --   --   --    PROT 7.2  --   --   --     139 137 136   K 4.0 3.8 3.4* 3.4*   CO2 24 25 28 27    105 105 105   BUN 20* 17 20* 18   CREATININE 0.92 0.78 0.86 0.82   ALKPHOS 113  --   --   --    ALT 17  --   --   --    AST 15  --   --   --    BILITOT 1.1  --   --   --        Dietary Orders:  Diet Orders            Diet Adult Regular: Regular starting at 11/10 1842            Based on the following criteria, this patient qualifies for intravenous to oral conversion:  [x] The patients gastrointestinal tract is functioning (tolerating medications via oral or enteral route for 24 hours and tolerating food or enteral feeds for 24 hours).  [x] The patient is hemodynamically stable for 24 hours (heart rate <100 beats per minute, systolic blood pressure >99 mm Hg, and respiratory rate <20 breaths per minute).  [x] The patient shows clinical improvement (afebrile for at least 24 hours and white blood cell count downtrending or normalized). Additionally, the patient must be non-neutropenic (absolute neutrophil count >500 cells/mm3).  [x] For  antimicrobials, the patient has received IV therapy for at least 24 hours.    IV medication azithromycin will be changed to oral medication azithromycin    Pharmacist's Name: Sallie Thapa  Pharmacist's Extension: [phone]

## 2024-11-13 NOTE — PROGRESS NOTES
"[facility] - [address] [place] [address] Medicine  Progress Note    Patient Name: Gris Hobbs  MRN: [id number]  Patient Class: IP- Inpatient   Admission Date: 11/10/2024  Length of Stay: 3 days  Attending Physician: Yamile Way MD  Primary Care Provider: Yobany Marsh MD        Subjective:     Principal Problem:Sepsis        HPI:  80 yo male present to [facility] c/o chills and a productive cough. He has a PMH of A.fib, CAD, HTN, GERD. Pt stated that his symptoms has been progressively worse over the past few week, but over the past several months, he's had chronic cough and congestion. He was seen by ENT for nasal congestion and  his PCP on 10/28/2024 for exacerbation of his underline lung disease. He was treated with steriods and antibiotics. Pt saw his pulmonologist on 11/7/2024. Chart review notes revealed pulmonary function tests significant for obstructive lung disease, without bronchodilator effect, most likely due to underlying reactive airway disease/asthma. Patient endorses minimal SOB, chills, fever, Pt currently denies headache, chest pain, or lower limb edema.    In ED pt was given 1L NaCl, blood cultures X2 drawn and started on Clindamycin IV. CXR done today showed: coarse interstitial attenuation suggests edema or other nonspecific pneumonitis. There is a more rounded focus of consolidation within the right lower lung zone, could reflect infectious consolidation given short-term development since exam 05/24/2024 however malignancy is not excluded.     Echocardiogram  at [facility] 1/30/2024 was normal. Around this time he had watchman procedure.    Lives with family. Ambulates independently without limitations.     12/16/2022 he had cholecystectomy       Overview/Hospital Course:  No notes on file    Interval History: Patient was found sitting in bed receiving a breathing treatment. His wife, who stayed overnight, reported "he was  making some  abnormal noises " during the night, which sounded like he was gasping for air. The nurse was called, and a physical exam was conducted, but no significant findings were noted. The patient stated that he feels a little better today, although he continues to have a persistent cough.    Review of Systems   Constitutional:  Negative for fever.   HENT:  Negative for sneezing.    Eyes:  Negative for visual disturbance.   Respiratory:  Positive for cough. Negative for wheezing.    Cardiovascular:  Negative for chest pain, palpitations and leg swelling.   Gastrointestinal:  Positive for constipation. Negative for abdominal pain and diarrhea.   Neurological:  Negative for dizziness, light-headedness and headaches.     Objective:     Vital Signs (Most Recent):  Temp: 98 °F (36.7 °C) (11/13/24 0705)  Pulse: 96 (11/13/24 0805)  Resp: 16 (11/13/24 0805)  BP: 124/84 (11/13/24 0705)  SpO2: 100 % (11/13/24 0805) Vital Signs (24h Range):  Temp:  [97.7 °F (36.5 °C)-98.3 °F (36.8 °C)] 98 °F (36.7 °C)  Pulse:  [74-96] 96  Resp:  [15-20] 16  SpO2:  [92 %-100 %] 100 %  BP: (123-138)/(71-84) 124/84     Weight: 86.7 kg (191 lb 3.2 oz)  Body mass index is 23.9 kg/m².    Intake/Output Summary (Last 24 hours) at 11/13/2024 0852  Last data filed at 11/13/2024 0134  Gross per 24 hour   Intake 921.11 ml   Output --   Net 921.11 ml         Physical Exam  Vitals and nursing note reviewed. Exam conducted with a chaperone present.   Constitutional:       General: He is awake. He is not in acute distress.     Appearance: Normal appearance. He is well-developed, well-groomed and overweight. He is not toxic-appearing or diaphoretic.      Interventions: Nasal cannula in place.   HENT:      Head: Normocephalic and atraumatic.      Mouth/Throat:      Pharynx: Oropharynx is clear.   Eyes:      Conjunctiva/sclera: Conjunctivae normal.   Cardiovascular:      Rate and Rhythm: Rhythm irregular.      Heart sounds: Normal heart sounds.   Pulmonary:      Effort: No respiratory  distress.      Breath sounds: Rhonchi present. No wheezing.   Abdominal:      Palpations: Abdomen is soft.   Musculoskeletal:      Cervical back: Normal range of motion and neck supple.      Right lower leg: No edema.      Left lower leg: No edema.   Skin:     General: Skin is warm and dry.   Neurological:      Mental Status: He is alert and oriented to person, place, and time.   Psychiatric:         Mood and Affect: Mood normal.         Behavior: Behavior normal. Behavior is cooperative.         Thought Content: Thought content normal.         Judgment: Judgment normal.             Significant Labs: All pertinent labs within the past 24 hours have been reviewed.    Significant Imaging: I have reviewed all pertinent imaging results/findings within the past 24 hours.    Assessment/Plan:      * Sepsis  This patient does have evidence of infective focus  My overall impression is sepsis.  Source: Respiratory  Antibiotics given-   Antibiotics (72h ago, onward)      Start     Stop Route Frequency Ordered    11/13/24 2100  azithromycin tablet 500 mg         -- Oral Daily 11/13/24 0840    11/11/24 1100  vancomycin (VANCOCIN) 1,750 mg in D5W 500 mL IVPB         -- IV Every 18 hours 11/11/24 0929    11/11/24 1029  vancomycin - pharmacy to dose         -- IV pharmacy to manage frequency 11/11/24 0929    11/1945  aztreonam injection 2,000 mg         -- IV Every 8 hours (non-standard times) 11/10/24 1838          Latest lactate reviewed-  Recent Labs   Lab 11/10/24  1629   LACTATE 1.3       Organ dysfunction indicated by  none    Fluid challenge Not needed - patient is not hypotensive      Post- resuscitation assessment No - Post resuscitation assessment not needed       Will Not start Pressors- Levophed for MAP of 65  Source control achieved by: cultures and antibiotics     11/12-Discharge was offered as an option for today, patient would like to stay another day to receive additional IV antibiotics.     12/13: Blood  cultures drawn prior to antibiotic showed no growth at 48  Patient has elected to stay an extra day to further optimize treatment  Will continue to monitor and treat with antibiotics      Pneumonia of right lower lobe due to infectious organism  Patient has a diagnosis of pneumonia. The cause of the pneumonia is suspected to be bacterial in etiology but organism is not known. The pneumonia is stable. The patient has the following signs/symptoms of pneumonia: cough, sputum production, and shortness of breath. The patient does not have a current oxygen requirement and the patient does not have a home oxygen requirement. I have reviewed the pertinent imaging. The following cultures have been collected: Blood cultures The culture results are listed below.     Current antimicrobial regimen consists of the antibiotics listed below. Will monitor patient closely and continue current treatment plan unchanged.    Antibiotics (From admission, onward)      Start     Stop Route Frequency Ordered    11/13/24 2100  azithromycin tablet 500 mg         -- Oral Daily 11/13/24 0840    11/11/24 1100  vancomycin (VANCOCIN) 1,750 mg in D5W 500 mL IVPB         -- IV Every 18 hours 11/11/24 0929    11/11/24 1029  vancomycin - pharmacy to dose         -- IV pharmacy to manage frequency 11/11/24 0929    11/1945  aztreonam injection 2,000 mg         -- IV Every 8 hours (non-standard times) 11/10/24 1838            Microbiology Results (last 7 days)       Procedure Component Value Units Date/Time    Culture, Lower Respiratory [[id number]] Collected: 11/12/24 1217    Order Status: Completed Specimen: Respiratory from Sputum Induced Updated: 11/13/24 0905     Culture, Lower Respiratory Typical respiratory danya to date     Gram Stain Result Few WBC observed      Moderate Epithelial cells      Many Gram positive cocci      Moderate Yeast    Blood culture x two cultures. Draw prior to antibiotics [[id number]] Collected: 11/10/24 1342    Order  Status: Completed Specimen: Blood from Peripheral, Hand, Left Updated: 11/13/24 0723     Culture, Blood No Growth At 48 Hours    Blood culture x two cultures. Draw prior to antibiotics [[id number]] Collected: 11/10/24 1337    Order Status: Completed Specimen: Blood from Peripheral, Forearm, Left Updated: 11/13/24 0723     Culture, Blood No Growth At 48 Hours    AFB Smear Only [[id number]] Collected: 11/12/24 1217    Order Status: Completed Specimen: Respiratory from Sputum Induced Updated: 11/12/24 2027     AFB Smear No acid fast bacilli seen    Culture, AFB [[id number]] Collected: 11/12/24 1217    Order Status: Resulted Specimen: Respiratory from Sputum Expectorated Updated: 11/12/24 1248    AFB Smear Only [[id number]] Collected: 11/12/24 1217    Order Status: Canceled Specimen: Respiratory from Sputum Induced Updated: 11/12/24 1248    AFB Smear Only [[id number]] Collected: 11/12/24 1217    Order Status: Canceled Specimen: Respiratory from Sputum Induced Updated: 11/12/24 1248    Culture, Lower Respiratory [[id number]]     Order Status: Canceled Specimen: Respiratory from Sputum Expectorated     AFB Smear Only [[id number]]     Order Status: Canceled Specimen: Respiratory from Sputum Expectorated     Influenza A & B by Molecular [[id number]]  (Normal) Collected: 11/10/24 1307    Order Status: Completed Specimen: Nasopharyngeal Swab Updated: 11/10/24 1421     INFLUENZA A MOLECULAR Negative     INFLUENZA B MOLECULAR  Negative        -Respiratory panel - negative for all included organisms.     -CTA:   Allowing for motion artifact, no convincing pulmonary thromboembolism.  Multifocal patchy regions of tree-in-bud type nodularity/consolidation primarily involving the right hemithorax, concerning for multifocal infectious process.  There is bilateral peribronchial thickening involving the airways to the lower lobes suggesting bronchial airways inflammation or infection.      11/13   -Lower Respiratory cultures -showed  many gram positive cocci   -Sputum sample smear was negative for acid fast bacilli seen   -Quantiferon Gold TB- negative   - Patient continues to have persistent cough. Continue antibiotic therapy           Paroxysmal atrial fibrillation  Patient has long standing persistent (>12 months) atrial fibrillation. Patient is currently in atrial fibrillation. [id number] Score: 3. The patients heart rate in the last 24 hours is as follows:  Pulse  Min: 74  Max: 96     Antiarrhythmics  diltiaZEM 24 hr capsule 240 mg, Daily, Oral  metoprolol succinate (TOPROL-XL) 24 hr tablet 25 mg, Daily, Oral    Anticoagulants   none    Plan  - Replete lytes with a goal of K>4, Mg >2  - Patient is not anticoagulated due to watchman  - Patient's afib is currently controlled  - telemetry        Coronary artery disease involving native coronary artery of native heart without angina pectoris  Patient with known CAD, which is controlled Will continue ASA and monitor for S/Sx of angina/ACS. Continue to monitor on telemetry.     Gastroesophageal reflux disease  -continue home Protonix        VTE Risk Mitigation (From admission, onward)           Ordered     IP VTE HIGH RISK PATIENT  Once         11/10/24 1841     Place sequential compression device  Until discontinued         11/10/24 1841                    Discharge Planning   EN: 11/12/2024     Code Status: Full Code   Is the patient medically ready for discharge?:     Reason for patient still in hospital (select all that apply): Patient trending condition, Laboratory test, and Treatment  Discharge Plan A: Home with family Sherine Escalera MD  Department of Hospital Medicine   [facility] - [facility] [address] [facility] Telemetry

## 2024-11-13 NOTE — ASSESSMENT & PLAN NOTE
Patient has long standing persistent (>12 months) atrial fibrillation. Patient is currently in atrial fibrillation. [id number] Score: 3. The patients heart rate in the last 24 hours is as follows:  Pulse  Min: 74  Max: 96     Antiarrhythmics  diltiaZEM 24 hr capsule 240 mg, Daily, Oral  metoprolol succinate (TOPROL-XL) 24 hr tablet 25 mg, Daily, Oral    Anticoagulants   none    Plan  - Replete lytes with a goal of K>4, Mg >2  - Patient is not anticoagulated due to watchman  - Patient's afib is currently controlled  - telemetry

## 2024-11-13 NOTE — ASSESSMENT & PLAN NOTE
This patient does have evidence of infective focus  My overall impression is sepsis.  Source: Respiratory  Antibiotics given-   Antibiotics (72h ago, onward)      Start     Stop Route Frequency Ordered    11/13/24 2100  azithromycin tablet 500 mg         -- Oral Daily 11/13/24 0840    11/11/24 1100  vancomycin (VANCOCIN) 1,750 mg in D5W 500 mL IVPB         -- IV Every 18 hours 11/11/24 0929    11/11/24 1029  vancomycin - pharmacy to dose         -- IV pharmacy to manage frequency 11/11/24 0929    11/1945  aztreonam injection 2,000 mg         -- IV Every 8 hours (non-standard times) 11/10/24 1838          Latest lactate reviewed-  Recent Labs   Lab 11/10/24  1629   LACTATE 1.3       Organ dysfunction indicated by  none    Fluid challenge Not needed - patient is not hypotensive      Post- resuscitation assessment No - Post resuscitation assessment not needed       Will Not start Pressors- Levophed for MAP of 65  Source control achieved by: cultures and antibiotics     11/12-Discharge was offered as an option for today, patient would like to stay another day to receive additional IV antibiotics.     12/13: Blood cultures drawn prior to antibiotic showed no growth at 48  Patient has elected to stay an extra day to further optimize treatment  Will continue to monitor and treat with antibiotics

## 2024-11-14 VITALS
BODY MASS INDEX: 23.77 KG/M2 | DIASTOLIC BLOOD PRESSURE: 72 MMHG | WEIGHT: 191.19 LBS | HEART RATE: 90 BPM | SYSTOLIC BLOOD PRESSURE: 123 MMHG | OXYGEN SATURATION: 90 % | TEMPERATURE: 98 F | RESPIRATION RATE: 18 BRPM | HEIGHT: 75 IN

## 2024-11-14 PROBLEM — I27.21 HIGH PULMONARY ARTERIAL PRESSURE: Status: ACTIVE | Noted: 2024-11-14

## 2024-11-14 LAB
ANION GAP SERPL CALCULATED.3IONS-SCNC: 16 MMOL/L (ref 7–16)
BASOPHILS # BLD AUTO: 0.06 K/UL (ref 0–0.2)
BASOPHILS NFR BLD AUTO: 0.6 % (ref 0–1)
BUN SERPL-MCNC: 15 MG/DL (ref 8–26)
BUN/CREAT SERPL: 19 (ref 6–20)
CALCIUM SERPL-MCNC: 8.9 MG/DL (ref 8.8–10)
CHLORIDE SERPL-SCNC: 104 MMOL/L (ref 98–107)
CO2 SERPL-SCNC: 23 MMOL/L (ref 23–31)
CREAT SERPL-MCNC: 0.79 MG/DL (ref 0.72–1.25)
CULTURE, LOWER RESPIRATORY: ABNORMAL
DIFFERENTIAL METHOD BLD: ABNORMAL
EGFR (NO RACE VARIABLE) (RUSH/TITUS): 90 ML/MIN/1.73M2
EOSINOPHIL # BLD AUTO: 0.16 K/UL (ref 0–0.5)
EOSINOPHIL NFR BLD AUTO: 1.6 % (ref 1–4)
ERYTHROCYTE [DISTWIDTH] IN BLOOD BY AUTOMATED COUNT: 14.3 % (ref 11.5–14.5)
GLUCOSE SERPL-MCNC: 119 MG/DL (ref 82–115)
GRAM STN SPEC: ABNORMAL
HCT VFR BLD AUTO: 35.8 % (ref 40–54)
HGB BLD-MCNC: 11.4 G/DL (ref 13.5–18)
IMM GRANULOCYTES # BLD AUTO: 0.15 K/UL (ref 0–0.04)
IMM GRANULOCYTES NFR BLD: 1.5 % (ref 0–0.4)
LYMPHOCYTES # BLD AUTO: 0.92 K/UL (ref 1–4.8)
LYMPHOCYTES NFR BLD AUTO: 9.2 % (ref 27–41)
MAGNESIUM SERPL-MCNC: 2.2 MG/DL (ref 1.6–2.6)
MCH RBC QN AUTO: 29.5 PG (ref 27–31)
MCHC RBC AUTO-ENTMCNC: 31.8 G/DL (ref 32–36)
MCV RBC AUTO: 92.5 FL (ref 80–96)
MONOCYTES # BLD AUTO: 0.98 K/UL (ref 0–0.8)
MONOCYTES NFR BLD AUTO: 9.8 % (ref 2–6)
MPC BLD CALC-MCNC: 10 FL (ref 9.4–12.4)
NEUTROPHILS # BLD AUTO: 7.73 K/UL (ref 1.8–7.7)
NEUTROPHILS NFR BLD AUTO: 77.3 % (ref 53–65)
NRBC # BLD AUTO: 0 X10E3/UL
NRBC, AUTO (.00): 0 %
PHOSPHATE SERPL-MCNC: 3.2 MG/DL (ref 2.3–4.7)
PLATELET # BLD AUTO: 261 K/UL (ref 150–400)
POTASSIUM SERPL-SCNC: 3.5 MMOL/L (ref 3.5–5.1)
PROCALCITONIN SERPL-MCNC: 0.7 NG/ML
RBC # BLD AUTO: 3.87 M/UL (ref 4.6–6.2)
SODIUM SERPL-SCNC: 139 MMOL/L (ref 136–145)
WBC # BLD AUTO: 10 K/UL (ref 4.5–11)

## 2024-11-14 PROCEDURE — 25000242 PHARM REV CODE 250 ALT 637 W/ HCPCS: Performed by: INTERNAL MEDICINE

## 2024-11-14 PROCEDURE — 63600175 PHARM REV CODE 636 W HCPCS: Performed by: HOSPITALIST

## 2024-11-14 PROCEDURE — 94761 N-INVAS EAR/PLS OXIMETRY MLT: CPT

## 2024-11-14 PROCEDURE — 25000003 PHARM REV CODE 250

## 2024-11-14 PROCEDURE — 25000003 PHARM REV CODE 250: Performed by: HOSPITALIST

## 2024-11-14 PROCEDURE — 36415 COLL VENOUS BLD VENIPUNCTURE: CPT

## 2024-11-14 PROCEDURE — 63600175 PHARM REV CODE 636 W HCPCS: Mod: JZ,JG

## 2024-11-14 PROCEDURE — 83735 ASSAY OF MAGNESIUM: CPT

## 2024-11-14 PROCEDURE — 85025 COMPLETE CBC W/AUTO DIFF WBC: CPT

## 2024-11-14 PROCEDURE — 94640 AIRWAY INHALATION TREATMENT: CPT

## 2024-11-14 PROCEDURE — 80048 BASIC METABOLIC PNL TOTAL CA: CPT

## 2024-11-14 PROCEDURE — 99239 HOSP IP/OBS DSCHRG MGMT >30: CPT | Mod: GC,,, | Performed by: HOSPITALIST

## 2024-11-14 PROCEDURE — 84100 ASSAY OF PHOSPHORUS: CPT

## 2024-11-14 RX ORDER — LEVOFLOXACIN 500 MG/1
500 TABLET, FILM COATED ORAL DAILY
Qty: 5 TABLET | Refills: 0 | Status: SHIPPED | OUTPATIENT
Start: 2024-11-14 | End: 2024-11-19

## 2024-11-14 RX ORDER — PREDNISONE 20 MG/1
20 TABLET ORAL DAILY
Qty: 5 TABLET | Refills: 0 | Status: SHIPPED | OUTPATIENT
Start: 2024-11-14 | End: 2024-11-19

## 2024-11-14 RX ADMIN — IPRATROPIUM BROMIDE AND ALBUTEROL SULFATE 3 ML: .5; 3 SOLUTION RESPIRATORY (INHALATION) at 07:11

## 2024-11-14 RX ADMIN — THERA TABS 1 TABLET: TAB at 08:11

## 2024-11-14 RX ADMIN — IPRATROPIUM BROMIDE AND ALBUTEROL SULFATE 3 ML: .5; 3 SOLUTION RESPIRATORY (INHALATION) at 12:11

## 2024-11-14 RX ADMIN — PANTOPRAZOLE SODIUM 40 MG: 40 TABLET, DELAYED RELEASE ORAL at 08:11

## 2024-11-14 RX ADMIN — ALLOPURINOL 100 MG: 100 TABLET ORAL at 08:11

## 2024-11-14 RX ADMIN — ASPIRIN 81 MG: 81 TABLET, COATED ORAL at 08:11

## 2024-11-14 RX ADMIN — VANCOMYCIN HYDROCHLORIDE 1500 MG: 500 INJECTION, POWDER, LYOPHILIZED, FOR SOLUTION INTRAVENOUS at 11:11

## 2024-11-14 RX ADMIN — METOPROLOL SUCCINATE 25 MG: 25 TABLET, EXTENDED RELEASE ORAL at 08:11

## 2024-11-14 RX ADMIN — DILTIAZEM HYDROCHLORIDE 240 MG: 120 CAPSULE, COATED, EXTENDED RELEASE ORAL at 08:11

## 2024-11-14 RX ADMIN — CHOLECALCIFEROL TAB 125 MCG (5000 UNIT) 5000 UNITS: 125 TAB at 08:11

## 2024-11-14 RX ADMIN — BUDESONIDE 0.5 MG: 0.5 INHALANT RESPIRATORY (INHALATION) at 08:11

## 2024-11-14 RX ADMIN — AZTREONAM 2000 MG: 2 INJECTION, POWDER, LYOPHILIZED, FOR SOLUTION INTRAMUSCULAR; INTRAVENOUS at 04:11

## 2024-11-14 RX ADMIN — ACETYLCYSTEINE 4 ML: 200 SOLUTION ORAL; RESPIRATORY (INHALATION) at 07:11

## 2024-11-14 NOTE — ASSESSMENT & PLAN NOTE
Patient has a diagnosis of pneumonia. The cause of the pneumonia is suspected to be bacterial in etiology but organism is not known. The pneumonia is stable. The patient has the following signs/symptoms of pneumonia: cough, sputum production, and shortness of breath. The patient does not have a current oxygen requirement and the patient does not have a home oxygen requirement. I have reviewed the pertinent imaging. The following cultures have been collected: Blood cultures The culture results are listed below.     Current antimicrobial regimen consists of the antibiotics listed below. Will monitor patient closely and continue current treatment plan unchanged.    Antibiotics (From admission, onward)      Start     Stop Route Frequency Ordered    11/14/24 1100  vancomycin (VANCOCIN) 1,500 mg in D5W 250 mL IVPB         -- IV Every 12 hours (non-standard times) 11/13/24 1905    11/13/24 2100  azithromycin tablet 500 mg         -- Oral Daily 11/13/24 0840    11/11/24 1029  vancomycin - pharmacy to dose         -- IV pharmacy to manage frequency 11/11/24 0929    11/1945  aztreonam injection 2,000 mg         -- IV Every 8 hours (non-standard times) 11/10/24 1838            Microbiology Results (last 7 days)       Procedure Component Value Units Date/Time    Culture, Lower Respiratory [[id number]]  (Abnormal) Collected: 11/12/24 1217    Order Status: Completed Specimen: Respiratory from Sputum Induced Updated: 11/14/24 0733     Culture, Lower Respiratory Heavy Growth Yeast     Comment: For further identification, fungus culture recommended. Isolate will be held for 7 days. Notify Micro department at [phone] if fungus culture ordered.        Gram Stain Result Few WBC observed      Moderate Epithelial cells      Many Gram positive cocci      Moderate Yeast    Blood culture x two cultures. Draw prior to antibiotics [[id number]] Collected: 11/10/24 1342    Order Status: Completed Specimen: Blood from Peripheral,  Hand, Left Updated: 11/14/24 0704     Culture, Blood No Growth At 72 Hours    Blood culture x two cultures. Draw prior to antibiotics [[id number]] Collected: 11/10/24 1337    Order Status: Completed Specimen: Blood from Peripheral, Forearm, Left Updated: 11/14/24 0704     Culture, Blood No Growth At 72 Hours    AFB Smear Only [[id number]] Collected: 11/12/24 1217    Order Status: Completed Specimen: Respiratory from Sputum Induced Updated: 11/12/24 2027     AFB Smear No acid fast bacilli seen    Culture, AFB [[id number]] Collected: 11/12/24 1217    Order Status: Resulted Specimen: Respiratory from Sputum Expectorated Updated: 11/12/24 1248    AFB Smear Only [[id number]] Collected: 11/12/24 1217    Order Status: Canceled Specimen: Respiratory from Sputum Induced Updated: 11/12/24 1248    AFB Smear Only [[id number]] Collected: 11/12/24 1217    Order Status: Canceled Specimen: Respiratory from Sputum Induced Updated: 11/12/24 1248    Culture, Lower Respiratory [[id number]]     Order Status: Canceled Specimen: Respiratory from Sputum Expectorated     AFB Smear Only [[id number]]     Order Status: Canceled Specimen: Respiratory from Sputum Expectorated     Influenza A & B by Molecular [[id number]]  (Normal) Collected: 11/10/24 1307    Order Status: Completed Specimen: Nasopharyngeal Swab Updated: 11/10/24 1421     INFLUENZA A MOLECULAR Negative     INFLUENZA B MOLECULAR  Negative        -Respiratory panel - negative for all included organisms.     -CTA:   Allowing for motion artifact, no convincing pulmonary thromboembolism.  Multifocal patchy regions of tree-in-bud type nodularity/consolidation primarily involving the right hemithorax, concerning for multifocal infectious process.  There is bilateral peribronchial thickening involving the airways to the lower lobes suggesting bronchial airways inflammation or infection.      11/13   -Lower Respiratory cultures -showed many gram positive cocci   -Sputum sample smear was  negative for acid fast bacilli seen   -Quantiferon Gold TB- negative   - Patient continues to have persistent cough. Continue antibiotic therapy     11/14: Patient will be discharged on PO Levofloxacin 500mg for 5 days and Prednisone 20mg for 5 days     Follow up with PCP and Pulmonologist

## 2024-11-14 NOTE — HOSPITAL COURSE
Gris Hobbs is a 78 yo male who presented to [facility] with c/o of productive cough and chills. He has a PMH of A.fib with RVR s/p watchman, CAD, HTN, GERD. In ED pt was given 1L NaCl, blood cultures X2 drawn and CXR showed evidence of consolidation within the right lower lung zone.Patient admitted with sepsis due to pulmonary source. Initially treated with IV Clindamycin, Zosyn and  Aztreonam. Clindamycin and Zosyn were replaced with Vancomycin and Azithromycin for better coverage. He was than transitioned to PO Vancomycin and Azithromycin after 4 days of IV antibiotic treatment. Respiratory panel, Influenza A and B,Covid, Blood cultures, sputum sample for acid fast bacilli and Quantiferon Gold TB were all negative. Lower Respiratory cultures showed many gram positive cocci. proNP was 2382; however echo showed normal EF and an estimated pulmonary artery systolic pressure of 51 mmHg.   Chest X-ray showed coarse interstitial attenuation suggests edema or other nonspecific pneumonitis. There is a more rounded focus of consolidation within the right lower lung zone, could reflect infectious consolidation given short-term development since exam 05/24/2024 however malignancy is not excluded. CTA was negative for pulmonary thromboembolism but showed multifocal patchy regions of tree-in-bud type nodularity/consolidation primarily involving the right hemithorax, concerning for multifocal infectious process.  There is bilateral peribronchial thickening involving the airways to the lower lobes suggesting bronchial airways inflammation or infection.Patient has reached maximum  benefit of hospitalization and he will be discharged home.

## 2024-11-14 NOTE — ASSESSMENT & PLAN NOTE
Patient with known CAD, which is controlled Will continue ASA and monitor for S/Sx of angina/ACS. Continue to monitor on telemetry.     Follow up with PCP and Cardiologist

## 2024-11-14 NOTE — DISCHARGE SUMMARY
[facility] - [address] [place] [address] Medicine  Discharge Summary      Patient Name: Gris Hobbs  MRN: [id number]  SHANNAN: [id number]  Patient Class: IP- Inpatient  Admission Date: 11/10/2024  Hospital Length of Stay: 4 days  Discharge Date and Time:  11/14/2024 2:24 PM  Attending Physician: No att. providers found   Discharging Provider: Sherine Escalera MD  Primary Care Provider: Yobany Marsh MD    Primary Care Team: Networked reference to record PCT     HPI:   78 yo male present to [facility] c/o chills and a productive cough. He has a PMH of A.fib, CAD, HTN, GERD. Pt stated that his symptoms has been progressively worse over the past few week, but over the past several months, he's had chronic cough and congestion. He was seen by ENT for nasal congestion and  his PCP on 10/28/2024 for exacerbation of his underline lung disease. He was treated with steriods and antibiotics. Pt saw his pulmonologist on 11/7/2024. Chart review notes revealed pulmonary function tests significant for obstructive lung disease, without bronchodilator effect, most likely due to underlying reactive airway disease/asthma. Patient endorses minimal SOB, chills, fever, Pt currently denies headache, chest pain, or lower limb edema.    In ED pt was given 1L NaCl, blood cultures X2 drawn and started on Clindamycin IV. CXR done today showed: coarse interstitial attenuation suggests edema or other nonspecific pneumonitis. There is a more rounded focus of consolidation within the right lower lung zone, could reflect infectious consolidation given short-term development since exam 05/24/2024 however malignancy is not excluded.     Echocardiogram  at [facility] 1/30/2024 was normal. Around this time he had watchman procedure.    Lives with family. Ambulates independently without limitations.     12/16/2022 he had cholecystectomy       * No surgery found *      Hospital Course:   Gris Hobbs is a 78 yo male who  presented to [facility] with c/o of productive cough and chills. He has a PMH of A.fib with RVR s/p watchman, CAD, HTN, GERD. In ED pt was given 1L NaCl, blood cultures X2 drawn and CXR showed evidence of consolidation within the right lower lung zone.Patient admitted with sepsis due to pulmonary source. Initially treated with IV Clindamycin, Zosyn and  Aztreonam. Clindamycin and Zosyn were replaced with Vancomycin and Azithromycin for better coverage. He was than transitioned to PO Vancomycin and Azithromycin after 4 days of IV antibiotic treatment. Respiratory panel, Influenza A and B,Covid, Blood cultures, sputum sample for acid fast bacilli and Quantiferon Gold TB were all negative. Lower Respiratory cultures showed many gram positive cocci. proNP was 2382; however echo showed normal EF and an estimated pulmonary artery systolic pressure of 51 mmHg.   Chest X-ray showed coarse interstitial attenuation suggests edema or other nonspecific pneumonitis. There is a more rounded focus of consolidation within the right lower lung zone, could reflect infectious consolidation given short-term development since exam 05/24/2024 however malignancy is not excluded. CTA was negative for pulmonary thromboembolism but showed multifocal patchy regions of tree-in-bud type nodularity/consolidation primarily involving the right hemithorax, concerning for multifocal infectious process.  There is bilateral peribronchial thickening involving the airways to the lower lobes suggesting bronchial airways inflammation or infection.Patient has reached maximum  benefit of hospitalization and he will be discharged home.           Goals of Care Treatment Preferences:  Code Status: Full Code      SDOH Screening:  The patient was screened for utility difficulties, food insecurity, transport difficulties, housing insecurity, and interpersonal safety and there were no concerns identified this admission.     Consults:   Consults (From  admission, onward)          Status Ordering Provider     Pharmacy to dose Vancomycin consult  Once        Provider:  (Not yet assigned)    Acknowledged O[facility] BENZ            Pulmonary  Pneumonia of right lower lobe due to infectious organism  Patient has a diagnosis of pneumonia. The cause of the pneumonia is suspected to be bacterial in etiology but organism is not known. The pneumonia is stable. The patient has the following signs/symptoms of pneumonia: cough, sputum production, and shortness of breath. The patient does not have a current oxygen requirement and the patient does not have a home oxygen requirement. I have reviewed the pertinent imaging. The following cultures have been collected: Blood cultures The culture results are listed below.     Current antimicrobial regimen consists of the antibiotics listed below. Will monitor patient closely and continue current treatment plan unchanged.    Antibiotics (From admission, onward)      Start     Stop Route Frequency Ordered    11/14/24 1100  vancomycin (VANCOCIN) 1,500 mg in D5W 250 mL IVPB         -- IV Every 12 hours (non-standard times) 11/13/24 1905    11/13/24 2100  azithromycin tablet 500 mg         -- Oral Daily 11/13/24 0840    11/11/24 1029  vancomycin - pharmacy to dose         -- IV pharmacy to manage frequency 11/11/24 0929    11/1945  aztreonam injection 2,000 mg         -- IV Every 8 hours (non-standard times) 11/10/24 1838            Microbiology Results (last 7 days)       Procedure Component Value Units Date/Time    Culture, Lower Respiratory [[id number]]  (Abnormal) Collected: 11/12/24 1217    Order Status: Completed Specimen: Respiratory from Sputum Induced Updated: 11/14/24 0733     Culture, Lower Respiratory Heavy Growth Yeast     Comment: For further identification, fungus culture recommended. Isolate will be held for 7 days. Notify Micro department at [phone] if fungus culture ordered.        Gram Stain Result Few WBC  observed      Moderate Epithelial cells      Many Gram positive cocci      Moderate Yeast    Blood culture x two cultures. Draw prior to antibiotics [[id number]] Collected: 11/10/24 1342    Order Status: Completed Specimen: Blood from Peripheral, Hand, Left Updated: 11/14/24 0704     Culture, Blood No Growth At 72 Hours    Blood culture x two cultures. Draw prior to antibiotics [[id number]] Collected: 11/10/24 1337    Order Status: Completed Specimen: Blood from Peripheral, Forearm, Left Updated: 11/14/24 0704     Culture, Blood No Growth At 72 Hours    AFB Smear Only [[id number]] Collected: 11/12/24 1217    Order Status: Completed Specimen: Respiratory from Sputum Induced Updated: 11/12/24 2027     AFB Smear No acid fast bacilli seen    Culture, AFB [[id number]] Collected: 11/12/24 1217    Order Status: Resulted Specimen: Respiratory from Sputum Expectorated Updated: 11/12/24 1248    AFB Smear Only [[id number]] Collected: 11/12/24 1217    Order Status: Canceled Specimen: Respiratory from Sputum Induced Updated: 11/12/24 1248    AFB Smear Only [[id number]] Collected: 11/12/24 1217    Order Status: Canceled Specimen: Respiratory from Sputum Induced Updated: 11/12/24 1248    Culture, Lower Respiratory [[id number]]     Order Status: Canceled Specimen: Respiratory from Sputum Expectorated     AFB Smear Only [[id number]]     Order Status: Canceled Specimen: Respiratory from Sputum Expectorated     Influenza A & B by Molecular [[id number]]  (Normal) Collected: 11/10/24 1307    Order Status: Completed Specimen: Nasopharyngeal Swab Updated: 11/10/24 1421     INFLUENZA A MOLECULAR Negative     INFLUENZA B MOLECULAR  Negative        -Respiratory panel - negative for all included organisms.     -CTA:   Allowing for motion artifact, no convincing pulmonary thromboembolism.  Multifocal patchy regions of tree-in-bud type nodularity/consolidation primarily involving the right hemithorax, concerning for multifocal infectious  process.  There is bilateral peribronchial thickening involving the airways to the lower lobes suggesting bronchial airways inflammation or infection.      11/13   -Lower Respiratory cultures -showed many gram positive cocci   -Sputum sample smear was negative for acid fast bacilli seen   -Quantiferon Gold TB- negative   - Patient continues to have persistent cough. Continue antibiotic therapy     11/14: Patient will be discharged on PO Levofloxacin 500mg for 5 days and Prednisone 20mg for 5 days     Follow up with PCP and Pulmonologist    Cardiac/Vascular  High pulmonary arterial pressure  Echocardiogram showed pulmonary artery pressure of 51 mmHg, Sleep study is recommended to rule out RICK    Follow up with Sleep Study          Coronary artery disease involving native coronary artery of native heart without angina pectoris  Patient with known CAD, which is controlled Will continue ASA and monitor for S/Sx of angina/ACS. Continue to monitor on telemetry.     Follow up with PCP and Cardiologist     Paroxysmal atrial fibrillation  Patient has long standing persistent (>12 months) atrial fibrillation. Patient is currently in atrial fibrillation. [id number] Score: 3. The patients heart rate in the last 24 hours is as follows:  Pulse  Min: 66  Max: 119     Antiarrhythmics  diltiaZEM 24 hr capsule 240 mg, Daily, Oral  metoprolol succinate (TOPROL-XL) 24 hr tablet 25 mg, Daily, Oral    Anticoagulants   none    Plan  - Replete lytes with a goal of K>4, Mg >2  - Patient is not anticoagulated due to watchman  - Patient's afib is currently controlled  - telemetry    Follow up with PCP and Cardiologist    ID  * Sepsis  This patient does have evidence of infective focus  My overall impression is sepsis.  Source: Respiratory  Antibiotics given-   Antibiotics (72h ago, onward)      Start     Stop Route Frequency Ordered    11/14/24 1100  vancomycin (VANCOCIN) 1,500 mg in D5W 250 mL IVPB         -- IV Every 12 hours (non-standard  "times) 11/13/24 1905    11/13/24 2100  azithromycin tablet 500 mg         -- Oral Daily 11/13/24 0840    11/11/24 1029  vancomycin - pharmacy to dose         -- IV pharmacy to manage frequency 11/11/24 0929    11/1945  aztreonam injection 2,000 mg         -- IV Every 8 hours (non-standard times) 11/10/24 1838          Latest lactate reviewed-  No results for input(s): "LACTATE", "POCLAC" in the last 72 hours.    Organ dysfunction indicated by  none    Fluid challenge Not needed - patient is not hypotensive      Post- resuscitation assessment No - Post resuscitation assessment not needed       Will Not start Pressors- Levophed for MAP of 65  Source control achieved by: cultures and antibiotics     11/12-Discharge was offered as an option for today, patient would like to stay another day to receive additional IV antibiotics.     11/13: Blood cultures drawn prior to antibiotic showed no growth at 48  Patient has elected to stay an extra day to further optimize treatment  Will continue to monitor and treat with antibiotics      Follow up with PCP      Final Active Diagnoses:    Diagnosis Date Noted POA    PRINCIPAL PROBLEM:  Sepsis [A41.9] 11/10/2024 Yes    Pneumonia of right lower lobe due to infectious organism [J18.9] 11/10/2024 Yes    Paroxysmal atrial fibrillation [I48.0] 07/05/2023 Yes    Coronary artery disease involving native coronary artery of native heart without angina pectoris [I25.10] 06/23/2022 Yes     Chronic    Gastroesophageal reflux disease [K21.9] 09/15/2022 Yes    High pulmonary arterial pressure [I27.21] 11/14/2024 Yes      Problems Resolved During this Admission:       Discharged Condition: fair    Disposition: Home or Self Care    Follow Up:   Follow-up Information       Yobany Marsh MD Follow up on 11/22/2024.    Specialties: Family Medicine, Emergency Medicine  Why: Follow up after hospitalization; 2:20 pm  Contact information:  [address] [facility]  Primary Care " "[facility] [address]  [phone]               Reji Yeh MD Follow up on 11/26/2024.    Specialties: Pulmonary Disease, Critical Care Medicine  Why: After hospitalization follow up, pneumonia; 2:00 pm  Contact information:  [address] [facility] [address]  [phone]                           Patient Instructions:      Ambulatory referral/consult to Sleep Disorders   Standing Status: Future   Referral Priority: Routine Referral Type: Consultation   Requested Specialty: Sleep Medicine   Number of Visits Requested: 1     Notify your health care provider if you experience any of the following:  temperature >100.4     Notify your health care provider if you experience any of the following:  severe uncontrolled pain     Notify your health care provider if you experience any of the following:  redness, tenderness, or signs of infection (pain, swelling, redness, odor or green/yellow discharge around incision site)     Notify your health care provider if you experience any of the following:  difficulty breathing or increased cough     Notify your health care provider if you experience any of the following:  increased confusion or weakness     Activity as tolerated       Significant Diagnostic Studies: Labs: All labs within the past 24 hours have been reviewed  Microbiology: Blood Culture No results found for: "LABBLOO", Sputum Culture No results found for: "GSRESP", "RESPIRATORYC", and Urine Culture  No results found for: "LABURIN"  Radiology: X-Ray: CXR: X-Ray Chest 1 View (CXR):   Results for orders placed or performed during the hospital encounter of 11/10/24   X-Ray Chest 1 View    Narrative    EXAMINATION:  XR CHEST 1 VIEW    CLINICAL HISTORY:  Sepsis;    TECHNIQUE:  Single frontal view of the chest was performed.    COMPARISON:  11/07/2024    FINDINGS:  The cardiomediastinal silhouette is not enlarged noting calcification of the aorta..  There is no pleural effusion.  The trachea is midline.  The " lungs are symmetrically expanded bilaterally with coarse interstitial attenuation bilaterally.  There is increased parenchymal attenuation projected over the right lower lung zone, noting a more rounded focus medially measuring 2.2 cm..  There is no pneumothorax.  The osseous structures are remarkable for degenerative change..      Impression    This report was flagged in Epic as abnormal.    1. Coarse interstitial attenuation suggests edema or other nonspecific pneumonitis.  There is a more rounded focus of consolidation within the right lower lung zone, could reflect infectious consolidation given short-term development since exam 05/24/2024 however malignancy is not excluded.  Short-term follow-up, no greater than 3 months, is recommended for exclusion of the same.      Electronically signed by: Nile Garcia MD  Date:    11/10/2024  Time:    13:48    and X-Ray Chest PA and Lateral (CXR): No results found for this visit on 11/10/24.  CT scan:  none  Cardiac Graphics: Echocardiogram: 2D echo with color flow doppler: No results found for this or any previous visit. and Transthoracic echo (TTE) complete (Cupid Only):   Results for orders placed or performed during the hospital encounter of 11/10/24   Echo   Result Value Ref Range    BSA 2.14 m2    A4C EF 61 %    LVOT stroke volume 52.1 cm3    LVIDd 4.8 3.5 - 6.0 cm    LV Systolic Volume 66.50 mL    LV Systolic Volume Index 30.9 mL/m2    LVIDs 3.9 2.1 - 4.0 cm    LV Diastolic Volume 107.21 mL    LV ESV A4C 20.86 mL    LV Diastolic Volume Index 49.87 mL/m2    LV EDV A4C 61.97 mL    Left Ventricular End Systolic Volume by Teichholz Method 66.50 mL    Left Ventricular End Diastolic Volume by Teichholz Method 107.21 mL    IVS 1.1 0.6 - 1.1 cm    LVOT diameter 2.2 cm    LVOT area 3.8 cm2    FS 18.8 (A) 28 - 44 %    Left Ventricle Relative Wall Thickness 0.29 cm    PW 0.7 0.6 - 1.1 cm    LV mass 147.8 g    LV Mass Index 68.7 g/m2    MV Peak E Christo 0.94 m/s    TDI LATERAL  0.13 m/s    TDI SEPTAL 0.10 m/s    E/E' ratio 8.17 m/s    MV Peak A Christo 0.31 m/s    TR Max Christo 2.98 m/s    E/A ratio 3.03     E wave deceleration time 144.82 msec    LV SEPTAL E/E' RATIO 9.40 m/s    LV LATERAL E/E' RATIO 7.23 m/s    LVOT peak chritso 0.9 m/s    Left Ventricular Outflow Tract Mean Velocity 0.57 cm/s    Left Ventricular Outflow Tract Mean Gradient 1.50 mmHg    RV- moore basal diam 3.9 cm    RV-moore mid d 4.0 cm    RV Basal Diameter 4.63 cm    RV-moore length 4.6 cm    RV mid diameter 3.99 cm    TAPSE 1.51 cm    RV/LV Ratio 0.81 cm    LA size 4.37 cm    RA Major Axis 5.36 cm    AV mean gradient 4.3 mmHg    AV peak gradient 7.8 mmHg    Ao peak christo 1.4 m/s    Ao VTI 22.8 cm    LVOT peak VTI 13.7 cm    AV valve area 2.3 cm²    AV Velocity Ratio 0.64     AV index (prosthetic) 0.60     LAUREN by Velocity Ratio 2.4 cm²    MV stenosis pressure 1/2 time 42.00 ms    MV valve area p 1/2 method 5.24 cm2    Triscuspid Valve Regurgitation Peak Gradient 36 mmHg    PV PEAK VELOCITY 0.80 m/s    PV peak gradient 3 mmHg    Ao root annulus 2.63 cm    IVC diameter 1.94 cm    Mean e' 0.12 m/s    ZLVIDS -0.68     ZLVIDD -3.74     LA area A4C 10.67 cm2    AORTIC VALVE CUSP SEPERATION 1.96 cm    TV resting pulmonary artery pressure 51 mmHg    RV TB RVSP 18 mmHg    Est. RA pres 15 mmHg    Narrative      Left Ventricle: The left ventricle is mildly dilated. Normal wall   thickness. There is low normal systolic function with a visually estimated   ejection fraction of 50 - 55%.    Right Ventricle: Normal right ventricular cavity size. Systolic   function is normal.    Right Atrium: Right atrium is mildly dilated.    Mitral Valve: There is mild regurgitation.    Tricuspid Valve: There is mild to moderate regurgitation.    Pulmonary Artery: The estimated pulmonary artery systolic pressure is   51 mmHg.    IVC/SVC: Elevated venous pressure at 15 mmHg.         Pending Diagnostic Studies:       Procedure Component Value Units Date/Time     EXTRA TUBES [[id number]] Collected: 11/12/24 0451    Order Status: Sent Lab Status: In process Updated: 11/12/24 0627    Specimen: Blood, Venous     Narrative:      The following orders were created for panel order EXTRA TUBES.  Procedure                               Abnormality         Status                     ---------                               -----------         ------                     Gold Top Hold[[id number]]                                   In process                   Please view results for these tests on the individual orders.    Procalcitonin [[id number]] Collected: 11/12/24 0451    Order Status: Sent Lab Status: In process Updated: 11/13/24 1343    Specimen: Blood            Medications:  Reconciled Home Medications:      Medication List        START taking these medications      levoFLOXacin 500 MG tablet  Commonly known as: LEVAQUIN  Take 1 tablet (500 mg total) by mouth once daily for 5 days.     predniSONE 20 MG tablet  Commonly known as: DELTASONE  Take 1 tablet (20 mg total) by mouth once daily for 5 days... with food            CONTINUE taking these medications      acetylcysteine 200 mg/ml (20%) 200 mg/mL (20 %) nebulizer solution  Commonly known as: MUCOMYST  Take 4 mLs by nebulization 3 (three) times daily.     albuterol-ipratropium 2.5 mg-0.5 mg/3 mL nebulizer solution  Commonly known as: DUO-NEB  Take 3 mLs by nebulization every 6 (six) hours as needed for Wheezing. Rescue     allopurinoL 300 MG tablet  Commonly known as: ZYLOPRIM  Take 1 tablet by mouth once daily     aspirin 81 MG EC tablet  Commonly known as: ECOTRIN  Take 81 mg by mouth once daily.     azelastine 137 mcg (0.1 %) nasal spray  Commonly known as: ASTELIN  1 spray (137 mcg total) by Nasal route 2 (two) times daily.     budesonide 0.5 mg/2 mL nebulizer solution  Commonly known as: PULMICORT  Take 2 mLs (0.5 mg total) by nebulization 2 (two) times daily. Controller     cyanocobalamin 1,000 mcg/mL injection  Inject 1  mL (1,000 mcg total) into the muscle every 30 days.     diltiaZEM 240 MG 24 hr capsule  Commonly known as: CARDIZEM CD  Take 1 capsule (240 mg total) by mouth once daily.     fluticasone propion-salmeteroL 55-14 mcg/actuation Aepb  Inhale 1 puff into the lungs 2 (two) times a day.     metoprolol succinate 25 MG 24 hr tablet  Commonly known as: TOPROL-XL  Take 25 mg by mouth once daily.     ONE DAILY MULTIVITAMIN per tablet  Generic drug: multivitamin  Take 1 tablet by mouth once daily.     pantoprazole 40 MG tablet  Commonly known as: PROTONIX  Take 1 tablet by mouth once daily     VITAMIN D3 125 mcg (5,000 unit) Tab  Generic drug: cholecalciferol (vitamin D3)  Take 5,000 Units by mouth once daily.              Indwelling Lines/Drains at time of discharge:   Lines/Drains/Airways       None                   Time spent on the discharge of patient: 40 minutes         Sherine Escalera MD  Department of Hospital Medicine  [facility] - [facility] Telemetry

## 2024-11-14 NOTE — DISCHARGE INSTRUCTIONS
Hospitalist Discharge orders  *Notify your healthcare provider if you experience any of the following: temperature >100.4  *Notify your healthcare provider if you experience any of the following: redness, tenderness, or any signs or symptoms of infection (pain, swelling, redness, odor or green/yellow drainage around incision site).  *Notify your healthcare provider if you experience any of the following: difficulty breathing or increased cough.  *Notify your physician if you experience any persistent nausea, vomiting, diarrhea or headache.  *Notify your physician if you experience any of the following: severe uncontrolled pain, worsening rash, increased confusion, weakness, dizziness, lightheadedness, or visual disturbances.

## 2024-11-14 NOTE — ASSESSMENT & PLAN NOTE
Echocardiogram showed pulmonary artery pressure of 51 mmHg, Sleep study is recommended to rule out RICK    Follow up with Sleep Study

## 2024-11-14 NOTE — NURSING
Discharge instructions reviewed with patient and spouse; copy given to patient. Patient and spouse voiced understanding regarding: meds, appt., signs and symptoms to report to physician.

## 2024-11-14 NOTE — ASSESSMENT & PLAN NOTE
"This patient does have evidence of infective focus  My overall impression is sepsis.  Source: Respiratory  Antibiotics given-   Antibiotics (72h ago, onward)      Start     Stop Route Frequency Ordered    11/14/24 1100  vancomycin (VANCOCIN) 1,500 mg in D5W 250 mL IVPB         -- IV Every 12 hours (non-standard times) 11/13/24 1905    11/13/24 2100  azithromycin tablet 500 mg         -- Oral Daily 11/13/24 0840    11/11/24 1029  vancomycin - pharmacy to dose         -- IV pharmacy to manage frequency 11/11/24 0929    11/1945  aztreonam injection 2,000 mg         -- IV Every 8 hours (non-standard times) 11/10/24 1838          Latest lactate reviewed-  No results for input(s): "LACTATE", "POCLAC" in the last 72 hours.    Organ dysfunction indicated by  none    Fluid challenge Not needed - patient is not hypotensive      Post- resuscitation assessment No - Post resuscitation assessment not needed       Will Not start Pressors- Levophed for MAP of 65  Source control achieved by: cultures and antibiotics     11/12-Discharge was offered as an option for today, patient would like to stay another day to receive additional IV antibiotics.     11/13: Blood cultures drawn prior to antibiotic showed no growth at 48  Patient has elected to stay an extra day to further optimize treatment  Will continue to monitor and treat with antibiotics      Follow up with PCP  "

## 2024-11-14 NOTE — ASSESSMENT & PLAN NOTE
Patient has long standing persistent (>12 months) atrial fibrillation. Patient is currently in atrial fibrillation. [id number] Score: 3. The patients heart rate in the last 24 hours is as follows:  Pulse  Min: 66  Max: 119     Antiarrhythmics  diltiaZEM 24 hr capsule 240 mg, Daily, Oral  metoprolol succinate (TOPROL-XL) 24 hr tablet 25 mg, Daily, Oral    Anticoagulants   none    Plan  - Replete lytes with a goal of K>4, Mg >2  - Patient is not anticoagulated due to watchman  - Patient's afib is currently controlled  - telemetry    Follow up with PCP and Cardiologist

## 2024-11-15 ENCOUNTER — PATIENT OUTREACH (OUTPATIENT)
Dept: ADMINISTRATIVE | Facility: CLINIC | Age: 79
End: 2024-11-15
Payer: MEDICARE

## 2024-11-15 NOTE — PLAN OF CARE
[facility] - [facility] Telemetry  Discharge Final Note    Primary Care Provider: Yobany Marsh MD    Expected Discharge Date: 11/14/2024    Final Discharge Note (most recent)       Final Note - 11/15/24 0838          Final Note    Assessment Type Final Discharge Note     Anticipated Discharge Disposition Home or Self Care                     Important Message from Medicare  Important Message from Medicare regarding Discharge Appeal Rights: Given to patient/caregiver, Explained to patient/caregiver, Signed/date by patient/caregiver     Date IMM was signed: 11/13/24  Time IMM was signed: 1333    Contact Info       Yobany Marsh MD   Specialty: Family Medicine, Emergency Medicine   Relationship: PCP - General    [address] [facility]  [facility] [address]   Phone: [phone]       Next Steps: Follow up on 11/22/2024    Instructions: Follow up after hospitalization; 2:20 pm    Reji Yeh MD   Specialty: Pulmonary Disease, Critical Care Medicine    [address] [facility] [address]   Phone: [phone]       Next Steps: Follow up on 11/26/2024    Instructions: After hospitalization follow up, pneumonia; 2:00 pm        Pt discharged home with no needs.

## 2024-11-15 NOTE — PROGRESS NOTES
C3 nurse spoke with Gris Hobbs  for a TCC post hospital discharge follow up call. The patient has a scheduled [facility] appointment with Yobany Marsh MD  on 11/22/24 @ 0147.

## 2024-11-16 LAB
BACTERIA BLD CULT: NORMAL
BACTERIA BLD CULT: NORMAL

## 2024-11-18 ENCOUNTER — TELEPHONE (OUTPATIENT)
Dept: PULMONOLOGY | Facility: CLINIC | Age: 79
End: 2024-11-18
Payer: MEDICARE

## 2024-11-18 NOTE — TELEPHONE ENCOUNTER
----- Message from Genna sent at 11/18/2024  1:39 PM CST -----  Who Called: Gris Hobbs    Caller is requesting assistance/information from provider's office.    Symptoms (please be specific): has been out of the hospital since last Thursday and wants to see if he needs to do anything else since he changed his meds. Please call back pt, said he ran out of pills that he was given and wants to make sure he is on the right track according to Dr. Yeh         Preferred Method of Contact: Phone Call  Patient's Preferred Phone Number on File: [phone] CELL, [phone] HOME

## 2024-11-18 NOTE — TELEPHONE ENCOUNTER
Called and spoke with pt. States he is still on abx but finishing up soon. Let him know that he does have hosp follow up appointment on the 26th with Dr Yeh that he is tracking on

## 2024-11-22 ENCOUNTER — OFFICE VISIT (OUTPATIENT)
Dept: FAMILY MEDICINE | Facility: CLINIC | Age: 79
End: 2024-11-22
Payer: MEDICARE

## 2024-11-22 VITALS
TEMPERATURE: 97 F | BODY MASS INDEX: 23 KG/M2 | HEIGHT: 75 IN | WEIGHT: 185 LBS | SYSTOLIC BLOOD PRESSURE: 154 MMHG | HEART RATE: 90 BPM | OXYGEN SATURATION: 98 % | DIASTOLIC BLOOD PRESSURE: 88 MMHG | RESPIRATION RATE: 18 BRPM

## 2024-11-22 DIAGNOSIS — J18.9 PNEUMONIA OF RIGHT LOWER LOBE DUE TO INFECTIOUS ORGANISM: Primary | ICD-10-CM

## 2024-11-22 PROCEDURE — 99495 TRANSJ CARE MGMT MOD F2F 14D: CPT | Mod: ,,, | Performed by: FAMILY MEDICINE

## 2024-11-25 NOTE — PROGRESS NOTES
Yobany Marsh MD        PATIENT NAME: Gris Hobbs  : 1945  DATE: 24  MRN: [id number]      Billing Provider: Yobany Marsh MD  Level of Service: TCM SERVICES (MODERATE COMPLEXITY)  Patient PCP Information       Provider PCP Type    Yobany Marsh MD General            Reason for Visit / Chief Complaint: Transitional Care (Was in hospital with pneumonia ) and Referral (Patient says he was told he needs a ct scan of lungs asap)       Update PCP  Update Chief Complaint         History of Present Illness / Problem Focused Workflow     Gris Hobbs presents to the clinic with Transitional Care (Was in hospital with pneumonia ) and Referral (Patient says he was told he needs a ct scan of lungs asap)     Accompanied by his wife for a transitional care visit.  Was admitted with pneumonia.  He does feel better.        Review of Systems     Review of Systems   Constitutional:  Negative for activity change, appetite change, fever and unexpected weight change.   HENT:  Negative for congestion, rhinorrhea, sinus pressure, sinus pain, sore throat and trouble swallowing.    Eyes:  Negative for photophobia, pain, discharge and visual disturbance.   Respiratory:  Negative for cough, chest tightness, wheezing and stridor.    Cardiovascular:  Negative for chest pain, palpitations and leg swelling.   Gastrointestinal:  Negative for abdominal pain, blood in stool, constipation, diarrhea and nausea.   Endocrine: Negative for polydipsia, polyphagia and polyuria.   Genitourinary:  Negative for difficulty urinating, flank pain and hematuria.   Musculoskeletal:  Negative for arthralgias and neck pain.   Skin:  Negative for rash.   Allergic/Immunologic: Negative for food allergies.   Neurological:  Negative for dizziness, tremors, seizures, syncope, weakness (global weakness) and headaches.   Psychiatric/Behavioral:  Negative for behavioral problems, confusion, decreased concentration, dysphoric mood and  hallucinations. The patient is not nervous/anxious.         Medical / Social / Family History     Past Medical History:   Diagnosis Date    Asthma     Atrial fibrillation     Coronary artery disease involving native coronary artery of native heart without angina pectoris     GERD (gastroesophageal reflux disease)     Gout     Hyperlipidemia     Hypertension     Ischemic cardiomyopathy     NSTEMI (non-ST elevated myocardial infarction) 10/2019    Presence of Watchman left atrial appendage closure device     2024       Past Surgical History:   Procedure Laterality Date    APPENDECTOMY      BASAL CELL CARCINOMA EXCISION      BIOPSY WITH TRANSRECTAL ULTRASOUND (TRUS) GUIDANCE      CARDIAC CATHETERIZATION      LAPAROSCOPIC CHOLECYSTECTOMY N/A 01/04/2023    Procedure: CHOLECYSTECTOMY, LAPAROSCOPIC;  Surgeon: Pablito Feliciano MD;  Location: [facility];  Service: General;  Laterality: N/A;    SQUAMOUS CELL CARCINOMA EXCISION         Social History    reports that he has never smoked. He has never been exposed to tobacco smoke. He has never used smokeless tobacco. He reports that he does not drink alcohol and does not use drugs.    Family History  's family history includes Cancer in his father; Colon cancer in his father; Heart attack in his mother.    Medications and Allergies     Medications  No outpatient medications have been marked as taking for the 11/22/24 encounter (Office Visit) with Yobany Marsh MD.       Allergies  Review of patient's allergies indicates:   Allergen Reactions    Pcn [penicillins]        Physical Examination     Vitals:    11/22/24 1401   BP: (!) 154/88   Pulse: 90   Resp: 18   Temp: 97.3 °F (36.3 °C)     Physical Exam  Constitutional:       General: He is not in acute distress.     Appearance: Normal appearance.   HENT:      Head: Normocephalic.      Right Ear: Tympanic membrane and ear canal normal.      Left Ear: Tympanic membrane and ear canal normal.      Nose: Nose normal.       Mouth/Throat:      Mouth: Mucous membranes are moist.      Pharynx: No oropharyngeal exudate.   Eyes:      Extraocular Movements: Extraocular movements intact.      Pupils: Pupils are equal, round, and reactive to light.   Cardiovascular:      Rate and Rhythm: Normal rate and regular rhythm.      Heart sounds: No murmur heard.  Pulmonary:      Effort: Pulmonary effort is normal.      Breath sounds: Normal breath sounds. No wheezing.   Abdominal:      General: Abdomen is flat. Bowel sounds are normal.      Palpations: Abdomen is soft.      Hernia: No hernia is present.   Musculoskeletal:         General: Normal range of motion.      Cervical back: Normal range of motion and neck supple.      Right lower leg: No edema.      Left lower leg: No edema.   Lymphadenopathy:      Cervical: No cervical adenopathy.   Skin:     General: Skin is warm and dry.      Coloration: Skin is not jaundiced.      Findings: No lesion.   Neurological:      General: No focal deficit present.      Mental Status: He is alert and oriented to person, place, and time.      Cranial Nerves: No cranial nerve deficit.      Gait: Gait normal.   Psychiatric:         Mood and Affect: Mood normal.         Behavior: Behavior normal.         Judgment: Judgment normal.          Assessment and Plan (including Health Maintenance)      Problem List  Smart Sets  Document Outside HM   :    Plan:           Health Maintenance Due   Topic Date Due    Hepatitis C Screening  Never done    RSV Vaccine (Age 60+ and Pregnant patients) (1 - 1-dose 75+ series) Never done    Influenza Vaccine (1) 09/01/2024       1. Pneumonia of right lower lobe due to infectious organism         Health Maintenance Topics with due status: Not Due       Topic Last Completion Date    Lipid Panel 03/25/2024       Future Appointments   Date Time Provider Department Center   11/26/2024  2:00 PM Reji Yeh MD OB  [facility]   12/4/2024  2:45 PM Santosh Chong DO [facility]    1/14/2025  9:00 AM Harleen Lubin MD [facility] DERM [place]   1/15/2025 10:00 AM Servando Mares MD [facility] ENT [facility]   2/10/2025  2:00 PM Reji Yeh MD [facility]      Repeat film will not be done since he is to see Dr. Yeh early next week  There are no Patient Instructions on file for this visit.  Follow up in about 6 months (around 5/22/2025) for routine followup.     Signature:  Yobany Marsh MD      Date of encounter: 11/22/24

## 2024-11-26 ENCOUNTER — OFFICE VISIT (OUTPATIENT)
Dept: PULMONOLOGY | Facility: CLINIC | Age: 79
End: 2024-11-26
Payer: MEDICARE

## 2024-11-26 VITALS
OXYGEN SATURATION: 96 % | RESPIRATION RATE: 16 BRPM | HEIGHT: 75 IN | SYSTOLIC BLOOD PRESSURE: 114 MMHG | BODY MASS INDEX: 23 KG/M2 | WEIGHT: 184.94 LBS | DIASTOLIC BLOOD PRESSURE: 70 MMHG | HEART RATE: 89 BPM

## 2024-11-26 DIAGNOSIS — R91.1 SOLITARY PULMONARY NODULE: Primary | ICD-10-CM

## 2024-11-26 DIAGNOSIS — R53.83 MALAISE AND FATIGUE: ICD-10-CM

## 2024-11-26 DIAGNOSIS — R53.81 MALAISE AND FATIGUE: ICD-10-CM

## 2024-11-26 DIAGNOSIS — R05.8 PRODUCTIVE COUGH: ICD-10-CM

## 2024-11-26 DIAGNOSIS — J22 LOWER RESPIRATORY INFECTION: ICD-10-CM

## 2024-11-26 DIAGNOSIS — R06.02 SHORTNESS OF BREATH: ICD-10-CM

## 2024-11-26 PROCEDURE — 99214 OFFICE O/P EST MOD 30 MIN: CPT | Mod: S$PBB,,, | Performed by: STUDENT IN AN ORGANIZED HEALTH CARE EDUCATION/TRAINING PROGRAM

## 2024-11-26 PROCEDURE — 99999 PR PBB SHADOW E&M-EST. PATIENT-LVL V: CPT | Mod: PBBFAC,,, | Performed by: STUDENT IN AN ORGANIZED HEALTH CARE EDUCATION/TRAINING PROGRAM

## 2024-11-26 PROCEDURE — 99215 OFFICE O/P EST HI 40 MIN: CPT | Mod: PBBFAC | Performed by: STUDENT IN AN ORGANIZED HEALTH CARE EDUCATION/TRAINING PROGRAM

## 2024-11-26 RX ORDER — BENZONATATE 100 MG/1
100 CAPSULE ORAL 3 TIMES DAILY PRN
Qty: 30 CAPSULE | Refills: 3 | Status: SHIPPED | OUTPATIENT
Start: 2024-11-26 | End: 2025-01-05

## 2024-11-26 NOTE — PROGRESS NOTES
Patient Name: Gris Hobbs   Primary Care Provider: Yobany Marsh MD   Date of Service:  6/4/24  Reason for Referral:  Subacute cough    Chief Complaint: Pneumonia (2wk follow up. Still c/o of a dry cough. )      Subjective:      Gris Hobbs is 79 y.o. male with Past medical history significant for subacute cough who presents today for evaluation of the same cough.        Follow up clinic visit 11/26/24  Had initiated triple inhaled therapy with DuoNebs and Mucomyst during exacerbation when he was last seen 2 weeks ago.  Since that time, the patient had an admission to the hospital.  I reviewed his CTA chest from 11/10/24 in detail which showed evidence of some mediastinal adenopathy along with bilateral patchy infiltrates suggestive of multifocal pneumonia with tree-in-bud nodularities present suggestive of an inflammatory process.  I reviewed his most recent CBC from 11/14/24 which showed no evidence of leukocytosis with a stable hemoglobin at 11.4 and normal electrolyte and renal function.  I also reviewed his ejection fraction from an echocardiogram done on 11/11/24 which showed elevated pulmonary artery systolic pressure up to 51 with a normal ejection fraction.  No acid-fast bacilli seen on an AFB smear.  Inpatient MRSA swab was negative as was influenza and COVID testing.  His QuantiFERON TB has also been negative.    Review of CT chest with bilateral opacification of the lung and possible left lower lobe nodule versus rounded atelectasis.  Patient is clinically improved now although clearly not at baseline.  Saturating at 96% on room air and not tachycardic.  He is normotensive.  Finished a course of antibiotics and systemic corticosteroids.  Continues on triple inhaled therapy via nebulizer now.      Follow up clinic visit 11/7/24   The patient appears to have had an exacerbation of his underlying lung disease with more of a productive and is rhonchus.  Recently finished a course of  steroids and antibiotics as prescribed by his primary care physician.  No chest imaging has been obtained.    Follow up clinic visit 10/15/24   Patient had a CT scan of the sinuses done on 9/3/24 which I reviewed personally there was evidence of deviated septum with some evidence of chronic sinusitis with evidence of mild fluid present and has been followed by ENT.  He said he has had no wheezing but has had some postnasal discharge which is causing him to have an occasional cough.  No fevers, wheezing or worsening shortness of breath noted.      Follow up clinic visit 6/4/24  Significant improvement in the patient's cough, status post initiation of ics/Laba inhaled treatment.  Reviewed his pulmonary function tests from 5/31/24 which showed no evidence of requiring oxygen with rest or ambulation.  Mild ventilatory impairment/obstructive lung disease with very mildly decreased DLCO.  I also personally reviewed his CT chest without contrast which showed evidence of calcified granulomas bilaterally.  No significant large masses, effusion seen.      Initial clinic visit 5/3/24   The patient states that he has had this cough for a couple of months, has undergone 3 complete rounds of antibiotics without any significant improvement.  He said that it is occasionally productive, worse at night.  He also endorses significant amount of wheezing that is present.  Denies any significant fevers recently.  I reviewed his imaging myself (chest x-ray from 03/1924) with no evidence of pneumothorax, pleural effusion or any acute process.  He did have a CT abdomen pelvis back in September of 2022 which again showed no evidence of infection/consolidation in the lower lobes of the lungs.  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  On review of his CBC from 3/25/24, no evidence of significant leukocytosis, stable hemoglobin at 13.7.        Assessment and Plan      Recent pneumonia   Malaise   Dyspnea with  exertion  Obstructive lung disease with wheezing  Productive cough  Solitary pulmonary nodule      Assessment:  The patient had a hospital course where he was admitted with treatment for presumed bacterial pneumonia with bilateral lower lobe opacifications and possible left lower lobe nodule versus rounded area of consolidation.  He is still not at baseline, having undergone some major physiological stress during his recent admission to the hospital.  He is hemodynamically stable today, continues to have a productive sounding cough.    Previously, pulmonary function tests are significant for obstructive lung disease, without bronchodilator effect (although, based on his symptoms and response to ics/Laba inhaled treatment, it is very likely that he has underlying reactive airway disease/asthma).          Plan  Continue with inhaled therapy with DuoNebs and budesonide, Mucomyst to be used as needed   Counseled to continue to use Acapella device and incentive spirometer   Repeat CT chest in 2 months from the CT performed in November of 2024, to ascertain resolution of pneumonia as well as to monitor left lower lobe nodule  Once again, I have counseled the patient and his wife on the red flag signs and symptoms that should bring them back to the emergency department.  Patient is not quite back to his baseline and I have discussed with them the possibility of the patient requiring hospitalization again if he has additional fevers, chills, worsening shortness of breath etc..  As per their request I have also attempted to reach out to speak to their son but has been unable to get through.  In the future, pending resolution findings on CT scan or based on patient's symptoms, he may be a candidate for flexible bronchoscopy with bronchoalveolar lavage  Tessalon Perles prescribed        Follow-up   Follow-up in 2 months' time  Reji Yeh MD  Interventional Pulmonary and Critical Care  [facility]      Problem  List Items Addressed This Visit    None            Past Medical History:   Diagnosis Date    Asthma     Atrial fibrillation     Coronary artery disease involving native coronary artery of native heart without angina pectoris     GERD (gastroesophageal reflux disease)     Gout     Hyperlipidemia     Hypertension     Ischemic cardiomyopathy     NSTEMI (non-ST elevated myocardial infarction) 10/2019    Presence of Watchman left atrial appendage closure device     2024      Past Surgical History:   Procedure Laterality Date    APPENDECTOMY      BASAL CELL CARCINOMA EXCISION      BIOPSY WITH TRANSRECTAL ULTRASOUND (TRUS) GUIDANCE      CARDIAC CATHETERIZATION      LAPAROSCOPIC CHOLECYSTECTOMY N/A 01/04/2023    Procedure: CHOLECYSTECTOMY, LAPAROSCOPIC;  Surgeon: Pablito Feliciano MD;  Location: [facility];  Service: General;  Laterality: N/A;    SQUAMOUS CELL CARCINOMA EXCISION       Family History   Problem Relation Name Age of Onset    Heart attack Mother      Colon cancer Father      Cancer Father       Review of patient's allergies indicates:   Allergen Reactions    Pcn [penicillins]       Social History     Tobacco Use    Smoking status: Never     Passive exposure: Never    Smokeless tobacco: Never   Substance Use Topics    Alcohol use: Never    Drug use: Never      Review of Systems       Objective:      Physical Exam  Constitutional:       General: He is not in acute distress.     Appearance: Normal appearance. He is normal weight. He is not ill-appearing or diaphoretic.   HENT:      Head: Normocephalic and atraumatic.      Nose: No congestion or rhinorrhea.      Mouth/Throat:      Mouth: Mucous membranes are moist.   Cardiovascular:      Rate and Rhythm: Normal rate and regular rhythm.      Pulses: Normal pulses.      Heart sounds: Normal heart sounds.   Pulmonary:      Effort: Pulmonary effort is normal. No respiratory distress.      Breath sounds: No stridor. Rhonchi present. No wheezing or rales.      Comments:  "Rhonchorous breath sounds bilaterally  Chest:      Chest wall: No tenderness.   Abdominal:      General: Abdomen is flat.   Musculoskeletal:      Cervical back: Normal range of motion. No rigidity.      Right lower leg: No edema.      Left lower leg: No edema.   Skin:     General: Skin is warm.      Findings: No erythema.   Neurological:      General: No focal deficit present.      Mental Status: He is alert and oriented to person, place, and time. Mental status is at baseline.   Psychiatric:         Mood and Affect: Mood normal.         Behavior: Behavior normal.         Thought Content: Thought content normal.                11/26/2024     1:50 PM 11/22/2024     2:01 PM 11/14/2024    11:38 AM 11/14/2024     8:13 AM 11/14/2024     7:41 AM 11/14/2024     7:18 AM 11/14/2024     4:23 AM   Pulmonary Function Tests   SpO2 96 % 98 % 90 % 93 % 96 % 96 % 93 %   Height 6' 3" (1.905 m) 6' 3" (1.905 m)        Weight 83.9 kg (184 lb 15.5 oz) 83.9 kg (185 lb)        BMI (Calculated) 23.1 23.1              Outpatient Encounter Medications as of 11/26/2024   Medication Sig Dispense Refill    acetylcysteine 200 mg/ml, 20%, (MUCOMYST) 200 mg/mL (20 %) nebulizer solution Take 4 mLs by nebulization 3 (three) times daily. 360 mL 0    albuterol-ipratropium (DUO-NEB) 2.5 mg-0.5 mg/3 mL nebulizer solution Take 3 mLs by nebulization every 6 (six) hours as needed for Wheezing. Rescue 75 mL 0    allopurinoL (ZYLOPRIM) 300 MG tablet Take 1 tablet by mouth once daily 90 tablet 0    aspirin (ECOTRIN) 81 MG EC tablet Take 81 mg by mouth once daily.      azelastine (ASTELIN) 137 mcg (0.1 %) nasal spray 1 spray (137 mcg total) by Nasal route 2 (two) times daily. 30 mL 2    budesonide (PULMICORT) 0.5 mg/2 mL nebulizer solution Take 2 mLs (0.5 mg total) by nebulization 2 (two) times daily. Controller 120 mL 5    cholecalciferol, vitamin D3, (VITAMIN D3) 125 mcg (5,000 unit) Tab Take 5,000 Units by mouth once daily.      cyanocobalamin 1,000 mcg/mL " injection Inject 1 mL (1,000 mcg total) into the muscle every 30 days. 1 mL 4    diltiaZEM (CARDIZEM CD) 240 MG 24 hr capsule Take 1 capsule (240 mg total) by mouth once daily. 90 capsule 3    fluticasone propion-salmeteroL 55-14 mcg/actuation AePB Inhale 1 puff into the lungs 2 (two) times a day. 1 each 11    metoprolol succinate (TOPROL-XL) 25 MG 24 hr tablet Take 25 mg by mouth once daily.      multivitamin (ONE DAILY MULTIVITAMIN) per tablet Take 1 tablet by mouth once daily.      pantoprazole (PROTONIX) 40 MG tablet Take 1 tablet by mouth once daily 90 tablet 0    [] levoFLOXacin (LEVAQUIN) 500 MG tablet Take 1 tablet (500 mg total) by mouth once daily for 5 days. 5 tablet 0    [] predniSONE (DELTASONE) 20 MG tablet Take 1 tablet (20 mg total) by mouth once daily for 5 days... with food 5 tablet 0     No facility-administered encounter medications on file as of 2024.       Assessment & Plan    As above                                          No orders of the defined types were placed in this encounter.

## 2024-12-02 RX ORDER — IPRATROPIUM BROMIDE AND ALBUTEROL SULFATE 2.5; .5 MG/3ML; MG/3ML
3 SOLUTION RESPIRATORY (INHALATION) EVERY 6 HOURS PRN
Qty: 75 ML | Refills: 0 | Status: SHIPPED | OUTPATIENT
Start: 2024-12-02 | End: 2025-12-02

## 2024-12-09 ENCOUNTER — TELEPHONE (OUTPATIENT)
Dept: PULMONOLOGY | Facility: CLINIC | Age: 79
End: 2024-12-09
Payer: MEDICARE

## 2024-12-09 DIAGNOSIS — R06.02 SHORTNESS OF BREATH: Primary | ICD-10-CM

## 2024-12-09 RX ORDER — IPRATROPIUM BROMIDE AND ALBUTEROL SULFATE 2.5; .5 MG/3ML; MG/3ML
3 SOLUTION RESPIRATORY (INHALATION) EVERY 6 HOURS PRN
Qty: 75 ML | Refills: 0 | Status: SHIPPED | OUTPATIENT
Start: 2024-12-09 | End: 2025-12-09

## 2024-12-09 NOTE — TELEPHONE ENCOUNTER
----- Message from Nikki sent at 12/9/2024 10:07 AM CST -----  Regarding: refill  Who Called: Cleopatra ([facility] pharmacy in [place])    Refill or New Rx:Refill  RX Name and Strength:albuterol-ipratropium (DUO-NEB) 2.5 mg-0.5 mg/3 mL nebulizer solution 75 mL  Sig: Take 3 mLs by nebulization every 6 (six) hours as needed for Wheezing. Rescue  Route: Nebulization      How is the patient currently taking it? (ex. 1XDay): as needed  Is this a 30 day or 90 day RX: 90  Local or Mail Order: Local  List of preferred pharmacies on file (remove unneeded): [unfilled]  [facility] [address] [place] [address] [address] [facility]  [address] [facility] [address]  Phone: [phone] Fax: [phone]      Ordering Provider: Rao      Preferred Method of Contact: Phone Call  Patient's Preferred Phone Number on File: [phone]   Best Call Back Number, if different: [facility] - [phone]  Additional Information: Mr. Landry is asking for a larger refill as he will be going out of town for the holidays,.

## 2024-12-09 NOTE — TELEPHONE ENCOUNTER
Mr. Hobbs requesting more refills since he will be going out of town for holidays. Patient cannot afford current RX. Pharmacy reached out requesting new Rx with a diagnose code present in order to meet financial needs.

## 2024-12-09 NOTE — TELEPHONE ENCOUNTER
----- Message from Med Assistant Lopez sent at 12/9/2024  3:48 PM CST -----  Who Called: [facility] in [place]    Pharmacy is calling to request assistance with Rx    Pharmacy name and phone number: above  Pharmacy contact: [phone]  Patient Name: above  Prescription Name: albuterol-ipratropium (DUO-NEB) 2.5 mg-0.5 mg/3 mL nebulizer solution  What do they need to clarify?:pt is requesting for larger qty due to traveling for holidays, doesn't want to run out         Preferred Method of Contact: Phone Call  Patient's Preferred Phone Number on File: [phone]   Best Call Back Number, if different:  Additional Information: above

## 2024-12-23 LAB
OHS QRS DURATION: 80 MS
OHS QTC CALCULATION: 408 MS

## 2025-01-06 ENCOUNTER — HOSPITAL ENCOUNTER (OUTPATIENT)
Dept: RADIOLOGY | Facility: HOSPITAL | Age: 80
Discharge: HOME | End: 2025-01-06
Attending: STUDENT IN AN ORGANIZED HEALTH CARE EDUCATION/TRAINING PROGRAM
Payer: MEDICARE

## 2025-01-06 DIAGNOSIS — R91.1 SOLITARY PULMONARY NODULE: ICD-10-CM

## 2025-01-06 PROCEDURE — 71250 CT THORAX DX C-: CPT | Mod: 26,,, | Performed by: RADIOLOGY

## 2025-01-06 PROCEDURE — 71250 CT THORAX DX C-: CPT | Mod: TC

## 2025-01-08 ENCOUNTER — TELEPHONE (OUTPATIENT)
Dept: PULMONOLOGY | Facility: CLINIC | Age: 80
End: 2025-01-08
Payer: MEDICARE

## 2025-01-08 NOTE — TELEPHONE ENCOUNTER
Mr. Hobbs called with concerns regarding more information about his recent CT scan and requested that his CT results be interpreted to him. Voiced to him that I will fwd to Dr. Yeh and follow up with the pt per provider information. Pt aware and understood these instructions.

## 2025-01-13 NOTE — TELEPHONE ENCOUNTER
Please let him know that I reviewed the imaging as well as the report and compared to his prior CT chest, there is improvement in his CT chest.  Based on the report, it appears he may have had an infection previously that has now gotten better at the time of his recent CT chest.

## 2025-01-14 ENCOUNTER — OFFICE VISIT (OUTPATIENT)
Dept: DERMATOLOGY | Facility: CLINIC | Age: 80
End: 2025-01-14
Payer: MEDICARE

## 2025-01-14 DIAGNOSIS — Z85.828 HISTORY OF NONMELANOMA SKIN CANCER: ICD-10-CM

## 2025-01-14 DIAGNOSIS — L57.0 ACTINIC KERATOSES: ICD-10-CM

## 2025-01-14 DIAGNOSIS — D48.9 NEOPLASM OF UNCERTAIN BEHAVIOR: Primary | ICD-10-CM

## 2025-01-14 PROCEDURE — 88312 SPECIAL STAINS GROUP 1: CPT | Mod: 26,ICN,, | Performed by: PATHOLOGY

## 2025-01-14 PROCEDURE — 88304 TISSUE EXAM BY PATHOLOGIST: CPT | Mod: 26,ICN,, | Performed by: PATHOLOGY

## 2025-01-14 PROCEDURE — 17000 DESTRUCT PREMALG LESION: CPT | Mod: XS,,, | Performed by: STUDENT IN AN ORGANIZED HEALTH CARE EDUCATION/TRAINING PROGRAM

## 2025-01-14 PROCEDURE — 87220 TISSUE EXAM FOR FUNGI: CPT | Mod: ICN,,, | Performed by: PATHOLOGY

## 2025-01-14 PROCEDURE — 88312 SPECIAL STAINS GROUP 1: CPT | Mod: TC,SUR | Performed by: STUDENT IN AN ORGANIZED HEALTH CARE EDUCATION/TRAINING PROGRAM

## 2025-01-14 PROCEDURE — 11102 TANGNTL BX SKIN SINGLE LES: CPT | Mod: ,,, | Performed by: STUDENT IN AN ORGANIZED HEALTH CARE EDUCATION/TRAINING PROGRAM

## 2025-01-14 PROCEDURE — 17003 DESTRUCT PREMALG LES 2-14: CPT | Mod: ,,, | Performed by: STUDENT IN AN ORGANIZED HEALTH CARE EDUCATION/TRAINING PROGRAM

## 2025-01-14 PROCEDURE — 99213 OFFICE O/P EST LOW 20 MIN: CPT | Mod: 25,,, | Performed by: STUDENT IN AN ORGANIZED HEALTH CARE EDUCATION/TRAINING PROGRAM

## 2025-01-14 NOTE — PROGRESS NOTES
[facility]  Thomas Lubin MD    [address] [address]  [phone]    Fax: [phone]    Patient Name: Gris Hobbs  Medical Record Number: [id number]  PCP: Yobany Marsh MD  Age: 79 y.o. : 1945  Contact: [phone] (home)     History of Present Illness:     Gris Hobbs is a 79 y.o.  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 Today,  he is concerned about skin lesion on the right postauricular scalp.     The patient has no other concerns today.    Review of Systems:     Unremarkable other than mentioned above.     Physical Exam:     General: Relaxed, oriented, alert    Skin examination of the scalp, face, neck, chest, back, abdomen, upper extremities and lower extremities were normal except for as listed below            Assessment and Plan:     1. History of NMSC   Well-healed scar on right radial forearm   No e/o recurrence   Recommend sunscreen and good photoprotection       2. Actinic Keratoses  Erythematous, scaly papules on right postauricular scalp, right temple, scalp, bilateral arms.    Plan: Liquid Nitrogen.  A total of 10 lesions were treated with liquid nitrogen for 2 freeze-thaw cycles lasting 5 seconds, located on the above locations.   The patient's consent was obtained including but not limited to risks of crusting, scabbing,  blistering, scarring, darker or lighter pigmentary change, recurrence, incomplete removal and infection.    Counseling.  Sun protective clothing and broad spectrum sunscreen can prevent the formation of AK.   AKs can be treated with cryotherapy, photodynamic therapy, imiquimod, topical 5-FU.  Actinic Keratoses are precancerous proliferations that occur within sun damaged skin. If untreated,  a small subset of AKs can develop into Squamous Cell Carcinoma.      3. Seborrheic keratoses   - brown stuck  on appearing papules/plaques  - patient educated on benign nature. No treatment necessary unless they become irritated or inflamed     4. Neoplasm of Uncertain Behavior   - irregular brown patch located on the left parietal scalp   Ddx includes: lentigo vs lentigo maligna       Shave biopsy     Pre-procedure Diagnosis: as above  Post_procedure Diagnosis: same  Estimated Blood Loss: <1cc    Findings: None  Complications: None  Specimens: to pathology      Written informed consent was obtained after discussing risks including pain, bleeding, infection, recurrence and scarring. The biopsy site was sterilely prepped with alcohol, which was allowed to dry completely, then locally infiltrated with 1% lidocaine with epinephrine, ~3 cc total. A shave biopsy was obtained using a Dermablade/15 and the specimen was sent to dermatopathology. Aluminum chloride was used for hemostasis. Antibiotic ointment and a clean dressing were applied. The patient tolerated the procedure well without complications. Verbal and written wound care instructions were given.    5. Onychomycosis (B35.1)  - discolored nails with onycholysis and subungual debris  Onychodystrophy   Plan:  Nail clipping today   If positive, will do pulse dose lamisil (250 mg daily for 7 days a month x 12 months)     Lamisil (terbinafine) Counseling: Patient counseling regarding adverse effects of lamisil including but not limited to headache, diarrhea, rash, upset stomach, liver function test abnormalities, itching, taste/smell disturbance, nausea, abdominal pain, and flatulence. There is a rare possibility of liver failure that can occur when taking lamisil. The patient understands that a baseline LFT and kidney function test may be required. The patient verbalized understanding of the proper use and possible adverse effects of lamisil. All of the patient's questions and concerns were addressed.        Return to clinic in 4 months for FSE    AVS printed with patient  instructions     Thomas Lubin MD   Mohs Surgery/Dermatologic Oncology  Dermatology

## 2025-01-15 ENCOUNTER — OFFICE VISIT (OUTPATIENT)
Dept: OTOLARYNGOLOGY | Facility: CLINIC | Age: 80
End: 2025-01-15
Payer: MEDICARE

## 2025-01-15 VITALS — WEIGHT: 184 LBS | HEIGHT: 75 IN | BODY MASS INDEX: 22.88 KG/M2

## 2025-01-15 DIAGNOSIS — R09.81 NASAL CONGESTION: Primary | ICD-10-CM

## 2025-01-15 DIAGNOSIS — J32.9 CHRONIC SINUSITIS, UNSPECIFIED LOCATION: ICD-10-CM

## 2025-01-15 PROCEDURE — 99999 PR PBB SHADOW E&M-EST. PATIENT-LVL III: CPT | Mod: PBBFAC,,, | Performed by: OTOLARYNGOLOGY

## 2025-01-15 PROCEDURE — 99213 OFFICE O/P EST LOW 20 MIN: CPT | Mod: PBBFAC | Performed by: OTOLARYNGOLOGY

## 2025-01-15 PROCEDURE — 99213 OFFICE O/P EST LOW 20 MIN: CPT | Mod: S$PBB,,, | Performed by: OTOLARYNGOLOGY

## 2025-01-15 NOTE — PROGRESS NOTES
Subjective:       Patient ID: Gris Hobbs is a 79 y.o. male.    Chief Complaint: Follow-up (3 month follow up. Patient states he has used nasal spray as directed. His nasal congestion has cleared. Now he has chest congestion.)    Follow-up  Associated symptoms include congestion.     Review of Systems   HENT:  Positive for congestion. Negative for postnasal drip and rhinorrhea.    All other systems reviewed and are negative.      Objective:      Physical Exam  General: NAD  Head: Normocephalic, atraumatic, no facial asymmetry/normal strength,  Ears: Both auricules normal in appearance, w/o deformities tympanic membranes normal external auditory canals normal  Nose: External nose w/o deformities normal turbinates no drainage or inflammation  Oral Cavity: Lips, gums, floor of mouth, tongue hard palate, and buccal mucosa without mass/lesion  Oropharynx: Mucosa pink and moist, soft palate, posterior pharynx and oropharyngeal wall without mass/lesion  Neck: Supple, symmetric, trachea midline, no palpable mass/lesion, no palpable cervical lymphadenopathy  Skin: Warm and dry, no concerning lesions  Respiratory: Respirations even, unlabored  Assessment:       1. Nasal congestion    2. Chronic sinusitis, unspecified location        Plan:       Much better with sprays   Continue

## 2025-01-16 DIAGNOSIS — K21.9 GASTROESOPHAGEAL REFLUX DISEASE, UNSPECIFIED WHETHER ESOPHAGITIS PRESENT: ICD-10-CM

## 2025-01-16 RX ORDER — PANTOPRAZOLE SODIUM 40 MG/1
40 TABLET, DELAYED RELEASE ORAL
Qty: 90 TABLET | Refills: 0 | Status: SHIPPED | OUTPATIENT
Start: 2025-01-16

## 2025-01-21 ENCOUNTER — OFFICE VISIT (OUTPATIENT)
Dept: PULMONOLOGY | Facility: CLINIC | Age: 80
End: 2025-01-21
Payer: MEDICARE

## 2025-01-21 DIAGNOSIS — R05.9 COUGH, UNSPECIFIED TYPE: ICD-10-CM

## 2025-01-21 DIAGNOSIS — R91.1 SOLITARY PULMONARY NODULE: Primary | ICD-10-CM

## 2025-01-21 DIAGNOSIS — R06.02 SHORTNESS OF BREATH: ICD-10-CM

## 2025-01-21 PROCEDURE — 98012 SYNCH AUDIO-ONLY EST SF 10: CPT | Mod: 93,,, | Performed by: STUDENT IN AN ORGANIZED HEALTH CARE EDUCATION/TRAINING PROGRAM

## 2025-01-21 RX ORDER — ACETYLCYSTEINE 200 MG/ML
SOLUTION ORAL; RESPIRATORY (INHALATION)
COMMUNITY
Start: 2024-12-10 | End: 2025-04-22 | Stop reason: SDUPTHER

## 2025-01-21 RX ORDER — IPRATROPIUM BROMIDE AND ALBUTEROL SULFATE 2.5; .5 MG/3ML; MG/3ML
3 SOLUTION RESPIRATORY (INHALATION) EVERY 6 HOURS PRN
Qty: 75 ML | Refills: 0 | Status: SHIPPED | OUTPATIENT
Start: 2025-01-21 | End: 2025-04-22 | Stop reason: SDUPTHER

## 2025-01-21 NOTE — PROGRESS NOTES
Established Patient - Audio Only Telehealth Visit     The patient location is:  [place]  The chief complaint leading to consultation is:  Follow up  Visit type: Virtual visit with audio only (telephone)  Total time spent with patient:  12 minutes, medical discussion portion was 11 minutes       The reason for the audio only service rather than synchronous audio and video virtual visit was related to technical difficulties or patient preference/necessity.     Each patient to whom I provide medical services by telemedicine is:  (1) informed of the relationship between the physician and patient and the respective role of any other health care provider with respect to management of the patient; and (2) notified that they may decline to receive medical services by telemedicine and may withdraw from such care at any time. Patient verbally consented to receive this service via voice-only telephone call.       HPI:  Reviewed his recent CT chest without contrast from 1/6/25 which showed decreased patchy airspace opacifications with symptoms consistent with resolving infection.  Patient is still has an occasional cough but his symptoms are significantly better as compared to prior.  His symptoms are considerably better today with no acute distress and an improving cough.     Assessment and plan:  Continue with current nebulized regimen, we will obtain CT chest in a few months for follow up.    Counseled to call the clinic or go to the emergency department/call 911 in the event of worsening symptoms or any other red flag signs/symptoms as explained to the patient in detail                This service was not originating from a related E/M service provided within the previous 7 days nor will  to an E/M service or procedure within the next 24 hours or my soonest available appointment.  Prevailing standard of care was able to be met in this audio-only visit.

## 2025-01-23 RX ORDER — ALLOPURINOL 300 MG/1
300 TABLET ORAL
Qty: 90 TABLET | Refills: 0 | Status: SHIPPED | OUTPATIENT
Start: 2025-01-23

## 2025-02-05 DIAGNOSIS — E53.8 B12 DEFICIENCY: ICD-10-CM

## 2025-02-05 RX ORDER — CYANOCOBALAMIN 1000 UG/ML
INJECTION, SOLUTION INTRAMUSCULAR; SUBCUTANEOUS
Qty: 1 ML | Refills: 4 | Status: SHIPPED | OUTPATIENT
Start: 2025-02-05

## 2025-04-13 DIAGNOSIS — K21.9 GASTROESOPHAGEAL REFLUX DISEASE, UNSPECIFIED WHETHER ESOPHAGITIS PRESENT: ICD-10-CM

## 2025-04-14 RX ORDER — PANTOPRAZOLE SODIUM 40 MG/1
40 TABLET, DELAYED RELEASE ORAL
Qty: 90 TABLET | Refills: 0 | Status: SHIPPED | OUTPATIENT
Start: 2025-04-14

## 2025-04-21 ENCOUNTER — OFFICE VISIT (OUTPATIENT)
Dept: DERMATOLOGY | Facility: CLINIC | Age: 80
End: 2025-04-21
Payer: MEDICARE

## 2025-04-21 ENCOUNTER — HOSPITAL ENCOUNTER (OUTPATIENT)
Dept: RADIOLOGY | Facility: HOSPITAL | Age: 80
Discharge: HOME | End: 2025-04-21
Attending: STUDENT IN AN ORGANIZED HEALTH CARE EDUCATION/TRAINING PROGRAM
Payer: MEDICARE

## 2025-04-21 DIAGNOSIS — D48.9 NEOPLASM OF UNCERTAIN BEHAVIOR: Primary | ICD-10-CM

## 2025-04-21 DIAGNOSIS — R05.9 COUGH, UNSPECIFIED TYPE: ICD-10-CM

## 2025-04-21 DIAGNOSIS — R91.1 SOLITARY PULMONARY NODULE: ICD-10-CM

## 2025-04-21 DIAGNOSIS — Z85.828 HISTORY OF NONMELANOMA SKIN CANCER: ICD-10-CM

## 2025-04-21 DIAGNOSIS — Z85.820 HISTORY OF MELANOMA: ICD-10-CM

## 2025-04-21 PROCEDURE — 71250 CT THORAX DX C-: CPT | Mod: 26,,, | Performed by: RADIOLOGY

## 2025-04-21 PROCEDURE — 71250 CT THORAX DX C-: CPT | Mod: TC

## 2025-04-21 NOTE — PROGRESS NOTES
[facility]  Thomas Lubin MD    [address] [place] [address]  [phone]    Fax: [phone]    Patient Name: Gris Hobbs  Medical Record Number: [id number]  PCP: Yobany Marsh MD  Age: 79 y.o. : 1945  Contact: [phone] (home)     History of Present Illness:     Gris Hobbs is a 79 y.o.  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 Dodge ). Patient is concerned with nodule on the L scalp that arose just last week and has grown.     The patient has no other concerns today.    Review of Systems:     Unremarkable other than mentioned above.     Physical Exam:     General: Relaxed, oriented, alert    Skin examination of the scalp, face, neck, chest, back, abdomen, upper extremities and lower extremities were normal except for as listed below      Assessment and Plan:     1. History of NMSC   Well-healed scar on R radial forearm  No e/o recurrence   Recommend sunscreen and good photoprotection       2. History of malignant melanoma  - well healed scar with NER but concern for in transit metastasis as below    Plan: Counseling  Patients with a history of melanoma should wear broad spectrum sunscreen and sun protective clothing.  Ecars from excisional sites of melanoma should be monitored for any recurrences. Monthly self-skin checks should be performed to monitor for any moles that change in size, shape or color, itch burn or bleed.  Contact Office if: Patient notices any new or changing moles, develops constitutional symptoms or develops new lesions within or around the previous melanoma scar.    3. Neoplasm of Uncertain Behavior   - firm nodule located on the L temporal scalp  Ddx includes: in transit mets vs pilar cyst     Punch Biopsy     Pre-procedure Diagnosis: as above   Post_procedure Diagnosis: same  Estimated Blood Loss:  1cc    Findings: None  Complications: None  Specimens: to pathology    Written informed consent was obtained after discussing risks including pain, bleeding, infection, recurrence and scarring. The biopsy site was sterilely prepped with alcohol, which was allowed to dry completely, then locally infiltrated with 1% lidocaine with epinephrine, ~3 cc total. A punch biopsy was obtained using a 4 mm size punch and the specimen was sent to dermatopathology. 1 suture was placed for hemostasis. Petrolatum ointment and a clean dressing were applied. The patient tolerated the procedure well without complications. Verbal and written wound care instructions were given. Sutures should be removed in {7-10 days.       Thomas Lubin MD   Mohs Surgery/Dermatologic Oncology  Dermatology

## 2025-04-22 ENCOUNTER — OFFICE VISIT (OUTPATIENT)
Dept: PULMONOLOGY | Facility: CLINIC | Age: 80
End: 2025-04-22
Payer: MEDICARE

## 2025-04-22 VITALS
SYSTOLIC BLOOD PRESSURE: 110 MMHG | DIASTOLIC BLOOD PRESSURE: 66 MMHG | HEART RATE: 75 BPM | BODY MASS INDEX: 22.88 KG/M2 | OXYGEN SATURATION: 99 % | WEIGHT: 184 LBS | HEIGHT: 75 IN | RESPIRATION RATE: 18 BRPM

## 2025-04-22 DIAGNOSIS — R06.02 SHORTNESS OF BREATH: ICD-10-CM

## 2025-04-22 DIAGNOSIS — J22 LOWER RESPIRATORY INFECTION: ICD-10-CM

## 2025-04-22 DIAGNOSIS — R05.8 PRODUCTIVE COUGH: ICD-10-CM

## 2025-04-22 DIAGNOSIS — J45.20 MILD INTERMITTENT ASTHMA WITHOUT COMPLICATION: Primary | ICD-10-CM

## 2025-04-22 PROCEDURE — 99999 PR PBB SHADOW E&M-EST. PATIENT-LVL IV: CPT | Mod: PBBFAC,,, | Performed by: STUDENT IN AN ORGANIZED HEALTH CARE EDUCATION/TRAINING PROGRAM

## 2025-04-22 PROCEDURE — 99214 OFFICE O/P EST MOD 30 MIN: CPT | Mod: PBBFAC | Performed by: STUDENT IN AN ORGANIZED HEALTH CARE EDUCATION/TRAINING PROGRAM

## 2025-04-22 PROCEDURE — 99214 OFFICE O/P EST MOD 30 MIN: CPT | Mod: S$PBB,,, | Performed by: STUDENT IN AN ORGANIZED HEALTH CARE EDUCATION/TRAINING PROGRAM

## 2025-04-22 RX ORDER — ACETYLCYSTEINE 200 MG/ML
4 SOLUTION ORAL; RESPIRATORY (INHALATION) 2 TIMES DAILY
Qty: 240 ML | Refills: 2 | Status: SHIPPED | OUTPATIENT
Start: 2025-04-22 | End: 2025-04-22

## 2025-04-22 RX ORDER — ACETYLCYSTEINE 200 MG/ML
4 SOLUTION ORAL; RESPIRATORY (INHALATION) 2 TIMES DAILY
Qty: 240 ML | Refills: 2 | Status: SHIPPED | OUTPATIENT
Start: 2025-04-22

## 2025-04-22 RX ORDER — DOXYCYCLINE HYCLATE 100 MG
100 TABLET ORAL EVERY 12 HOURS
Qty: 14 TABLET | Refills: 0 | Status: SHIPPED | OUTPATIENT
Start: 2025-04-22 | End: 2025-04-29

## 2025-04-22 RX ORDER — IPRATROPIUM BROMIDE AND ALBUTEROL SULFATE 2.5; .5 MG/3ML; MG/3ML
3 SOLUTION RESPIRATORY (INHALATION) EVERY 6 HOURS PRN
Qty: 75 ML | Refills: 0 | Status: SHIPPED | OUTPATIENT
Start: 2025-04-22 | End: 2026-04-22

## 2025-04-22 RX ORDER — IPRATROPIUM BROMIDE AND ALBUTEROL SULFATE 2.5; .5 MG/3ML; MG/3ML
3 SOLUTION RESPIRATORY (INHALATION) EVERY 6 HOURS PRN
Qty: 75 ML | Refills: 0 | Status: SHIPPED | OUTPATIENT
Start: 2025-04-22 | End: 2025-04-22

## 2025-04-22 NOTE — PATIENT INSTRUCTIONS
Please use your Mucomyst nebulizer twice a day (breakfast and dinner) until your mucus is less viscous and cough is improved     Use your DuoNeb nebulizer 3 times a day (breakfast, lunch and dinner)   Continue to use your Pulmicort nebulizer twice a day (breakfast and dinner) and rinse out your mouth after using this    You have also been prescribed 7 days of doxycycline    Please call 911 or come to the emergency department if you have any increased shortness of breath, fevers, chills, feeling unwell or difficulty breathing.

## 2025-04-22 NOTE — PROGRESS NOTES
Patient Name: Gris Hobbs   Primary Care Provider: Yobany Marsh MD   Date of Service:  6/4/24  Reason for Referral:  Subacute cough    Chief Complaint: Pulmonary Nodules      Subjective:      Gris Hobbs is 79 y.o. male with Past medical history significant for subacute cough who presents today for evaluation of the same cough.      Follow up clinic visit 4/22/25   I personally reviewed the patient's CT chest without contrast from 4/21/25 which showed a very minimal right-sided pleural effusion with some areas of mucus present in the airways.  On my personal review I was not able to appreciate the pleural effusion which I reviewed personally.  Patient has recently been diagnosed with metastatic malignant melanoma and follows with Dr. Lion and Dr. Lubin.  Has more of a productive cough in the absence of fevers.    Follow up telephone visit January 2025  Improvement in CT chest, follow up CT scan order    Follow up clinic visit 11/26/24  Had initiated triple inhaled therapy with DuoNebs and Mucomyst during exacerbation when he was last seen 2 weeks ago.  Since that time, the patient had an admission to the hospital.  I reviewed his CTA chest from 11/10/24 in detail which showed evidence of some mediastinal adenopathy along with bilateral patchy infiltrates suggestive of multifocal pneumonia with tree-in-bud nodularities present suggestive of an inflammatory process.  I reviewed his most recent CBC from 11/14/24 which showed no evidence of leukocytosis with a stable hemoglobin at 11.4 and normal electrolyte and renal function.  I also reviewed his ejection fraction from an echocardiogram done on 11/11/24 which showed elevated pulmonary artery systolic pressure up to 51 with a normal ejection fraction.  No acid-fast bacilli seen on an AFB smear.  Inpatient MRSA swab was negative as was influenza and COVID testing.  His QuantiFERON TB has also been negative.    Review of CT chest with bilateral  opacification of the lung and possible left lower lobe nodule versus rounded atelectasis.  Patient is clinically improved now although clearly not at baseline.  Saturating at 96% on room air and not tachycardic.  He is normotensive.  Finished a course of antibiotics and systemic corticosteroids.  Continues on triple inhaled therapy via nebulizer now.      Follow up clinic visit 11/7/24   The patient appears to have had an exacerbation of his underlying lung disease with more of a productive and is rhonchus.  Recently finished a course of steroids and antibiotics as prescribed by his primary care physician.  No chest imaging has been obtained.    Follow up clinic visit 10/15/24   Patient had a CT scan of the sinuses done on 9/3/24 which I reviewed personally there was evidence of deviated septum with some evidence of chronic sinusitis with evidence of mild fluid present and has been followed by ENT.  He said he has had no wheezing but has had some postnasal discharge which is causing him to have an occasional cough.  No fevers, wheezing or worsening shortness of breath noted.      Follow up clinic visit 6/4/24  Significant improvement in the patient's cough, status post initiation of ics/Laba inhaled treatment.  Reviewed his pulmonary function tests from 5/31/24 which showed no evidence of requiring oxygen with rest or ambulation.  Mild ventilatory impairment/obstructive lung disease with very mildly decreased DLCO.  I also personally reviewed his CT chest without contrast which showed evidence of calcified granulomas bilaterally.  No significant large masses, effusion seen.      Initial clinic visit 5/3/24   The patient states that he has had this cough for a couple of months, has undergone 3 complete rounds of antibiotics without any significant improvement.  He said that it is occasionally productive, worse at night.  He also endorses significant amount of wheezing that is present.  Denies any significant fevers  recently.  I reviewed his imaging myself (chest x-ray from 03/1924) with no evidence of pneumothorax, pleural effusion or any acute process.  He did have a CT abdomen pelvis back in September of 2022 which again showed no evidence of infection/consolidation in the lower lobes of the lungs.  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  On review of his CBC from 3/25/24, no evidence of significant leukocytosis, stable hemoglobin at 13.7.        Assessment and Plan      Pneumonia  Malaise, stable   Dyspnea with exertion  Obstructive lung disease with wheezing  Productive cough without fevers  Solitary pulmonary nodule  Metastatic melanoma      Assessment:  Has a return of his productive cough, recently was worse now improving.  Have reiterated starting Mucomyst again along with the use of his DuoNebs.  Given his productive cough, his history of prior pneumonias he may be experiencing another pneumonia at this time.    Previously, pulmonary function tests are significant for obstructive lung disease, without bronchodilator effect (although, based on his symptoms and response to ics/Laba inhaled treatment, it is very likely that he has underlying reactive airway disease/asthma).          Plan  Continue with inhaled therapy with DuoNebs and budesonide, Mucomyst to be used as needed   Doxycycline prescribed   Continued treatment for melanoma by Oncology/dermatology   We will hold off on additional imaging at this time as he may get additional imaging including a PET-CT scan by his oncologist  Counseled to call the clinic or go to the emergency department/call 911 in the event of worsening symptoms or any other red flag signs/symptoms as explained to the patient in detail          Reji Yeh MD  Interventional Pulmonary and Critical Care  [facility]      Problem List Items Addressed This Visit    None  Visit Diagnoses         Mild intermittent asthma without complication    -  Primary     Relevant Medications    acetylcysteine 200 mg/ml, 20%, (MUCOMYST) 200 mg/mL (20 %) nebulizer solution    albuterol-ipratropium (DUO-NEB) 2.5 mg-0.5 mg/3 mL nebulizer solution      Shortness of breath                      Past Medical History:   Diagnosis Date    Asthma     Atrial fibrillation     Coronary artery disease involving native coronary artery of native heart without angina pectoris     GERD (gastroesophageal reflux disease)     Gout     Hyperlipidemia     Hypertension     Ischemic cardiomyopathy     NSTEMI (non-ST elevated myocardial infarction) 10/2019    Presence of Watchman left atrial appendage closure device     2024      Past Surgical History:   Procedure Laterality Date    APPENDECTOMY      BASAL CELL CARCINOMA EXCISION      BIOPSY WITH TRANSRECTAL ULTRASOUND (TRUS) GUIDANCE      CARDIAC CATHETERIZATION      LAPAROSCOPIC CHOLECYSTECTOMY N/A 01/04/2023    Procedure: CHOLECYSTECTOMY, LAPAROSCOPIC;  Surgeon: Pablito Feliciano MD;  Location: [facility], [place];  Service: General;  Laterality: N/A;    SQUAMOUS CELL CARCINOMA EXCISION       Family History   Problem Relation Name Age of Onset    Heart attack Mother      Colon cancer Father      Cancer Father       Review of patient's allergies indicates:   Allergen Reactions    Pcn [penicillins]       Social History     Tobacco Use    Smoking status: Never     Passive exposure: Never    Smokeless tobacco: Never   Substance Use Topics    Alcohol use: Never    Drug use: Never      Review of Systems       Objective:      Physical Exam  Constitutional:       General: He is not in acute distress.     Appearance: Normal appearance. He is normal weight. He is not ill-appearing or diaphoretic.   HENT:      Head: Normocephalic and atraumatic.      Nose: No congestion or rhinorrhea.      Mouth/Throat:      Mouth: Mucous membranes are moist.   Cardiovascular:      Rate and Rhythm: Normal rate and regular rhythm.      Pulses: Normal pulses.      Heart sounds: Normal heart  "sounds.   Pulmonary:      Effort: Pulmonary effort is normal. No respiratory distress.      Breath sounds: No stridor. Rhonchi present. No wheezing or rales.      Comments: Rhonchorous breath sounds bilaterally  Chest:      Chest wall: No tenderness.   Abdominal:      General: Abdomen is flat.   Musculoskeletal:      Cervical back: Normal range of motion. No rigidity.      Right lower leg: No edema.      Left lower leg: No edema.   Skin:     General: Skin is warm.      Findings: No erythema.   Neurological:      General: No focal deficit present.      Mental Status: He is alert and oriented to person, place, and time. Mental status is at baseline.   Psychiatric:         Mood and Affect: Mood normal.         Behavior: Behavior normal.         Thought Content: Thought content normal.                4/22/2025     1:52 PM 1/15/2025    10:08 AM 11/26/2024     1:50 PM 11/22/2024     2:01 PM 11/14/2024    11:38 AM 11/14/2024     8:13 AM 11/14/2024     7:41 AM   Pulmonary Function Tests   SpO2 99 %  96 % 98 % 90 % 93 % 96 %   Height 6' 3" (1.905 m) 6' 3" (1.905 m) 6' 3" (1.905 m) 6' 3" (1.905 m)      Weight 83.5 kg (184 lb) 83.5 kg (184 lb) 83.9 kg (184 lb 15.5 oz) 83.9 kg (185 lb)      BMI (Calculated) 23 23 23.1 23.1            Outpatient Encounter Medications as of 4/22/2025   Medication Sig Dispense Refill    aspirin (ECOTRIN) 81 MG EC tablet Take 81 mg by mouth once daily.      budesonide (PULMICORT) 0.5 mg/2 mL nebulizer solution Take 2 mLs (0.5 mg total) by nebulization 2 (two) times daily. Controller 120 mL 5    cholecalciferol, vitamin D3, (VITAMIN D3) 125 mcg (5,000 unit) Tab Take 5,000 Units by mouth once daily.      cyanocobalamin 1,000 mcg/mL injection INJECT 1 ML INTRAMUSCULARLY EVERY 30 DAYS 1 mL 4    diltiaZEM (CARDIZEM CD) 240 MG 24 hr capsule Take 1 capsule (240 mg total) by mouth once daily. 90 capsule 3    metoprolol succinate (TOPROL-XL) 25 MG 24 hr tablet Take 25 mg by mouth once daily.      " multivitamin (ONE DAILY MULTIVITAMIN) per tablet Take 1 tablet by mouth once daily.      pantoprazole (PROTONIX) 40 MG tablet Take 1 tablet by mouth once daily 90 tablet 0    [DISCONTINUED] acetylcysteine 200 mg/ml, 20%, (MUCOMYST) 200 mg/mL (20 %) nebulizer solution SMARTSI Milliliter(s) Via Nebulizer 3 Times Daily      [DISCONTINUED] albuterol-ipratropium (DUO-NEB) 2.5 mg-0.5 mg/3 mL nebulizer solution Take 3 mLs by nebulization every 6 (six) hours as needed for Wheezing. Rescue 75 mL 0    [DISCONTINUED] allopurinoL (ZYLOPRIM) 300 MG tablet Take 1 tablet by mouth once daily 90 tablet 0    acetylcysteine 200 mg/ml, 20%, (MUCOMYST) 200 mg/mL (20 %) nebulizer solution Take 4 mLs by nebulization 2 (two) times daily. 240 mL 2    albuterol-ipratropium (DUO-NEB) 2.5 mg-0.5 mg/3 mL nebulizer solution Take 3 mLs by nebulization every 6 (six) hours as needed for Wheezing. Rescue 75 mL 0    [] doxycycline (VIBRA-TABS) 100 MG tablet Take 1 tablet (100 mg total) by mouth every 12 (twelve) hours. for 7 days 14 tablet 0    [] fluticasone propion-salmeteroL 55-14 mcg/actuation AePB Inhale 1 puff into the lungs 2 (two) times a day. (Patient not taking: Reported on 2025) 1 each 11    [DISCONTINUED] acetylcysteine 200 mg/ml, 20%, (MUCOMYST) 200 mg/mL (20 %) nebulizer solution Take 4 mLs by nebulization 2 (two) times daily. 240 mL 2    [DISCONTINUED] albuterol-ipratropium (DUO-NEB) 2.5 mg-0.5 mg/3 mL nebulizer solution Take 3 mLs by nebulization every 6 (six) hours as needed for Wheezing. Rescue 75 mL 0    [DISCONTINUED] azelastine (ASTELIN) 137 mcg (0.1 %) nasal spray 1 spray (137 mcg total) by Nasal route 2 (two) times daily. (Patient not taking: Reported on 2025) 30 mL 2    [DISCONTINUED] pantoprazole (PROTONIX) 40 MG tablet Take 1 tablet by mouth once daily 90 tablet 0     No facility-administered encounter medications on file as of 2025.       Assessment & Plan    As above                                           No orders of the defined types were placed in this encounter.

## 2025-04-23 ENCOUNTER — TELEPHONE (OUTPATIENT)
Dept: PULMONOLOGY | Facility: CLINIC | Age: 80
End: 2025-04-23
Payer: MEDICARE

## 2025-04-23 NOTE — TELEPHONE ENCOUNTER
----- Message from Davide sent at 4/23/2025 10:30 AM CDT -----  Who Called: Gris Llanes's Preferred Phone Number on File: [phone] Best Call Back Number, if different:Additional Information: pt asked if the nurse can give him a call to ask about some medicine

## 2025-04-24 ENCOUNTER — RESULTS FOLLOW-UP (OUTPATIENT)
Dept: DERMATOLOGY | Facility: CLINIC | Age: 80
End: 2025-04-24

## 2025-04-30 NOTE — PROGRESS NOTES
Patient was informed on 4/24 of results. Case discussed with Dr. Lion, his oncologist. He is planning for a PET scan and will make decision for further management based on those results. He saw Dr. Lion in clinic yesterday

## 2025-05-01 ENCOUNTER — CLINICAL SUPPORT (OUTPATIENT)
Dept: DERMATOLOGY | Facility: CLINIC | Age: 80
End: 2025-05-01
Payer: MEDICARE

## 2025-05-01 DIAGNOSIS — Z48.02 VISIT FOR SUTURE REMOVAL: Primary | ICD-10-CM

## 2025-05-01 NOTE — PROGRESS NOTES
[facility]   Thomas Lubin MD    Patient Name: Gris Hobbs  Patient YOB: 1945   Date of Service: 5/1/25    CC: Suture removal    HPI: Gris Hobbs is a 79 y.o. male here today for suture removal on the L temporal scalp.  The area is healing well.  Patient denies fever, chills, puss, or redness to the area.    Past Medical History:   Diagnosis Date    Asthma     Atrial fibrillation     Coronary artery disease involving native coronary artery of native heart without angina pectoris     GERD (gastroesophageal reflux disease)     Gout     Hyperlipidemia     Hypertension     Ischemic cardiomyopathy     NSTEMI (non-ST elevated myocardial infarction) 10/2019    Presence of Watchman left atrial appendage closure device     2024     Past Surgical History:   Procedure Laterality Date    APPENDECTOMY      BASAL CELL CARCINOMA EXCISION      BIOPSY WITH TRANSRECTAL ULTRASOUND (TRUS) GUIDANCE      CARDIAC CATHETERIZATION      LAPAROSCOPIC CHOLECYSTECTOMY N/A 01/04/2023    Procedure: CHOLECYSTECTOMY, LAPAROSCOPIC;  Surgeon: Pablito Feliciano MD;  Location: [facility], [place];  Service: General;  Laterality: N/A;    SQUAMOUS CELL CARCINOMA EXCISION       Review of patient's allergies indicates:   Allergen Reactions    Pcn [penicillins]      Current Medications[1]      Exam: A focused skin exam was performed. All areas examined were normal except as mentioned in the assessment and plan below.  General Appearance of the patient is well developed and well nourished.  Orientation: alert and oriented x 3.  Mood and affect: pleasant.    Assessment:   There were no encounter diagnoses.    Plan:   Suture Removal (Global Period)  Body Locations: L temporal scalp   I reviewed the pathology results with the patient in detail.  The examination of the site was clean, dry and intact. Sutures were removed.  Vaseline applied.    I counseled the patient regarding the following:  Expectations: Sutured wounds tend to have  50% of the strength of normal skin at the time of suture removal. Try avoiding rigorous exercise or lifting greater than 10 pounds.  Contact office if: you develop pain, redness, tenderness or pus at the surgical site.    A culture was not obtained from the area           No follow-ups on file.    Thomas Serrato MD             [1]   Current Outpatient Medications:     acetylcysteine 200 mg/ml, 20%, (MUCOMYST) 200 mg/mL (20 %) nebulizer solution, Take 4 mLs by nebulization 2 (two) times daily., Disp: 240 mL, Rfl: 2    albuterol-ipratropium (DUO-NEB) 2.5 mg-0.5 mg/3 mL nebulizer solution, Take 3 mLs by nebulization every 6 (six) hours as needed for Wheezing. Rescue, Disp: 75 mL, Rfl: 0    allopurinoL (ZYLOPRIM) 300 MG tablet, Take 1 tablet by mouth once daily, Disp: 90 tablet, Rfl: 0    aspirin (ECOTRIN) 81 MG EC tablet, Take 81 mg by mouth once daily., Disp: , Rfl:     budesonide (PULMICORT) 0.5 mg/2 mL nebulizer solution, Take 2 mLs (0.5 mg total) by nebulization 2 (two) times daily. Controller, Disp: 120 mL, Rfl: 5    cholecalciferol, vitamin D3, (VITAMIN D3) 125 mcg (5,000 unit) Tab, Take 5,000 Units by mouth once daily., Disp: , Rfl:     cyanocobalamin 1,000 mcg/mL injection, INJECT 1 ML INTRAMUSCULARLY EVERY 30 DAYS, Disp: 1 mL, Rfl: 4    diltiaZEM (CARDIZEM CD) 240 MG 24 hr capsule, Take 1 capsule (240 mg total) by mouth once daily., Disp: 90 capsule, Rfl: 3    fluticasone propion-salmeteroL 55-14 mcg/actuation AePB, Inhale 1 puff into the lungs 2 (two) times a day. (Patient not taking: Reported on 4/22/2025), Disp: 1 each, Rfl: 11    metoprolol succinate (TOPROL-XL) 25 MG 24 hr tablet, Take 25 mg by mouth once daily., Disp: , Rfl:     multivitamin (ONE DAILY MULTIVITAMIN) per tablet, Take 1 tablet by mouth once daily., Disp: , Rfl:     pantoprazole (PROTONIX) 40 MG tablet, Take 1 tablet by mouth once daily, Disp: 90 tablet, Rfl: 0

## 2025-05-05 RX ORDER — ALLOPURINOL 300 MG/1
300 TABLET ORAL
Qty: 90 TABLET | Refills: 0 | Status: SHIPPED | OUTPATIENT
Start: 2025-05-05

## 2025-06-11 DIAGNOSIS — E53.8 B12 DEFICIENCY: ICD-10-CM

## 2025-06-11 RX ORDER — CYANOCOBALAMIN 1000 UG/ML
INJECTION, SOLUTION INTRAMUSCULAR; SUBCUTANEOUS
Qty: 1 ML | Refills: 4 | Status: SHIPPED | OUTPATIENT
Start: 2025-06-11

## 2025-06-11 NOTE — TELEPHONE ENCOUNTER
Called and spoke to patients father Daniel. Scheduled an office visit with Dr. Cruz on Friday 12/16 at 2:40pm as requested.   Please see the attached refill request.

## 2025-07-11 DIAGNOSIS — K21.9 GASTROESOPHAGEAL REFLUX DISEASE, UNSPECIFIED WHETHER ESOPHAGITIS PRESENT: ICD-10-CM

## 2025-07-11 RX ORDER — PANTOPRAZOLE SODIUM 40 MG/1
40 TABLET, DELAYED RELEASE ORAL
Qty: 90 TABLET | Refills: 0 | Status: SHIPPED | OUTPATIENT
Start: 2025-07-11

## 2025-07-15 ENCOUNTER — OFFICE VISIT (OUTPATIENT)
Dept: DERMATOLOGY | Facility: CLINIC | Age: 80
End: 2025-07-15
Payer: MEDICARE

## 2025-07-15 DIAGNOSIS — D48.9 NEOPLASM OF UNCERTAIN BEHAVIOR: Primary | ICD-10-CM

## 2025-07-15 DIAGNOSIS — Z85.820 HISTORY OF MALIGNANT MELANOMA: ICD-10-CM

## 2025-07-15 DIAGNOSIS — Z85.828 HISTORY OF NONMELANOMA SKIN CANCER: ICD-10-CM

## 2025-07-15 DIAGNOSIS — L57.0 ACTINIC KERATOSES: ICD-10-CM

## 2025-07-15 PROCEDURE — 17003 DESTRUCT PREMALG LES 2-14: CPT | Mod: ,,, | Performed by: STUDENT IN AN ORGANIZED HEALTH CARE EDUCATION/TRAINING PROGRAM

## 2025-07-15 PROCEDURE — 88305 TISSUE EXAM BY PATHOLOGIST: CPT | Mod: 26,,, | Performed by: PATHOLOGY

## 2025-07-15 PROCEDURE — 17000 DESTRUCT PREMALG LESION: CPT | Mod: XS,,, | Performed by: STUDENT IN AN ORGANIZED HEALTH CARE EDUCATION/TRAINING PROGRAM

## 2025-07-15 PROCEDURE — 11102 TANGNTL BX SKIN SINGLE LES: CPT | Mod: ,,, | Performed by: STUDENT IN AN ORGANIZED HEALTH CARE EDUCATION/TRAINING PROGRAM

## 2025-07-15 PROCEDURE — 88305 TISSUE EXAM BY PATHOLOGIST: CPT | Mod: TC,SUR | Performed by: STUDENT IN AN ORGANIZED HEALTH CARE EDUCATION/TRAINING PROGRAM

## 2025-07-15 PROCEDURE — 99213 OFFICE O/P EST LOW 20 MIN: CPT | Mod: 25,,, | Performed by: STUDENT IN AN ORGANIZED HEALTH CARE EDUCATION/TRAINING PROGRAM

## 2025-07-15 NOTE — PROGRESS NOTES
[facility]  Thomas Lubin MD    [address] [place] [address]  [phone]    Fax: [phone]    Patient Name: Gris Hobbs  Medical Record Number: [id number]  PCP: Yobany Marsh MD  Age: 79 y.o. : 1945  Contact: [phone] (home)     History of Present Illness:     Gris Hobbs is a 79 y.o.  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 Dodge ).     Initial melanoma biopsy on scalp was at least 0.8 mm Breslow and was excised and grafted . He had in transit metastatic melanoma biopsied on 25 and excised with Dr. Dodge in May but with positive deep margins. He is scheduled for second excision with Dr. Dodge 25. He has had [place] testing (Class 1a). Recent PET scans were clear (25) and has been following with Dr. Lion and is currently receiving Keyruda q3 weeks.     The patient has no other concerns today.    Review of Systems:     Unremarkable other than mentioned above.     Physical Exam:     General: Relaxed, oriented, alert    Skin examination of the scalp, face, neck, chest, back, abdomen, upper extremities and lower extremities were normal except for as listed below    Denies     Assessment and Plan:     1. History of NMSC   Well-healed scar on R radial forearm  No e/o recurrence   Recommend sunscreen and good photoprotection       2. History of malignant melanoma  - well healed scar with NER     Plan: Counseling  Patients with a history of melanoma should wear broad spectrum sunscreen and sun protective clothing.  Ecars from excisional sites of melanoma should be monitored for any recurrences. Monthly self-skin checks should be performed to monitor for any moles that change in size, shape or color, itch burn or bleed.  Contact Office if: Patient notices any new or changing moles, develops  constitutional symptoms or develops new lesions within or around the previous melanoma scar.    3. Actinic Keratoses  Erythematous, scaly papules on left ear, left cheek, scalp, forehead, right ear, left arm, right arm left arm (monitor)    Plan: Liquid Nitrogen.  A total of 12 lesions were treated with liquid nitrogen for 2 freeze-thaw cycles lasting 5 seconds, located on the above locations.   The patient's consent was obtained including but not limited to risks of crusting, scabbing,  blistering, scarring, darker or lighter pigmentary change, recurrence, incomplete removal and infection.    Counseling.  Sun protective clothing and broad spectrum sunscreen can prevent the formation of AK.   AKs can be treated with cryotherapy, photodynamic therapy, imiquimod, topical 5-FU.  Actinic Keratoses are precancerous proliferations that occur within sun damaged skin. If untreated,  a small subset of AKs can develop into Squamous Cell Carcinoma.      4. Neoplasm of Uncertain Behavior   - non healing papule located on the right shoulder  Ddx includes: ISK vs SCC vs BCC      Shave biopsy      Pre-procedure Diagnosis: as above  Post_procedure Diagnosis: same  Estimated Blood Loss: <1cc    Findings: None  Complications: None  Specimens: to pathology      Written informed consent was obtained after discussing risks including pain, bleeding, infection, recurrence and scarring. The biopsy site was sterilely prepped with alcohol, which was allowed to dry completely, then locally infiltrated with 1% lidocaine with epinephrine, ~3 cc total. A shave biopsy was obtained using a Dermablade/15 and the specimen was sent to dermatopathology. Aluminum chloride was used for hemostasis. Antibiotic ointment and a clean dressing were applied. The patient tolerated the procedure well without complications. Verbal and written wound care instructions were given.    MD Thomas Silva MD   Mohs Surgery/Dermatologic  Oncology  Dermatology

## 2025-07-15 NOTE — PATIENT INSTRUCTIONS
"Biopsy Site Care  Starting tomorrow you may shower and wash the area with antibacterial soap  Pat dry and apply vaseline and a bandaid  The area will be irritated, sore, slightly red, and may itch, sting, or burn  Do not apply make-up to the area until it is healed  There will be a scar anytime we cut the skin to the level of the dermis, which occurs in a biopsy   The area will take 1-2 weeks to heal  No soaking in the tub or hot tub for one week      Liquid Nitrogen Wound Care     - the areas treated with liquid nitrogen may form sores, blisters, and scabs. This is normal   - if a blister forms, please keep it intact and do not rupture it. It will resolve within a few days   - please keep petrolatum ointment to the area once to twice daily. You can cover with a bandage if you wish, but it is usually not necessary   - the area may be painful for a few days. We recommend ice, or over the counter tylenol or NSAIDs (such as ibuprofen or naproxen, if you are able to take these)   - this may result in a scar or a "hypopigmented" or lighter area of skin. This may or may not resolve with time   - please call our clinic if the lesion does not resolve, as this can be treated again    "

## 2025-07-17 LAB
ESTROGEN SERPL-MCNC: NORMAL PG/ML
INSULIN SERPL-ACNC: NORMAL U[IU]/ML
LAB AP GROSS DESCRIPTION: NORMAL
LAB AP LABORATORY NOTES: NORMAL
LAB AP SPEC A DDX: NORMAL
LAB AP SPEC A MORPHOLOGY: NORMAL
LAB AP SPEC A PROCEDURE: NORMAL
T3RU NFR SERPL: NORMAL %

## 2025-07-24 ENCOUNTER — TELEPHONE (OUTPATIENT)
Dept: DERMATOLOGY | Facility: CLINIC | Age: 80
End: 2025-07-24
Payer: MEDICARE

## (undated) DEVICE — GLOVE PROTEXIS PI SYN SURG 6.5

## (undated) DEVICE — ELECTRODE LAPSCP L HOOK 36CM

## (undated) DEVICE — APPLIER CLIP ENDO LIGAMAX 5MM

## (undated) DEVICE — GOWN NONREINF SET-IN SLV 2XL

## (undated) DEVICE — Device

## (undated) DEVICE — IRRIGATOR ENDOSCOPY DISP.

## (undated) DEVICE — GLOVE PROTEXIS PI SYN SURG 6.0

## (undated) DEVICE — GLOVE 6.5 PROTEXIS PI BLUE

## (undated) DEVICE — TROCAR ENDO Z THREAD KII 5X100

## (undated) DEVICE — DRAPE UTILITY W/ TAPE 20X30IN

## (undated) DEVICE — GLOVE PROTEXIS PI SYN SURG 8.0

## (undated) DEVICE — STRIP MEDI WND CLSR 1/4X4IN

## (undated) DEVICE — BAG RECTANGLE RBBRBND 30X36IN

## (undated) DEVICE — SCISSORS INSERT ELECTROSCOPE

## (undated) DEVICE — CORD LAP 10 DISP

## (undated) DEVICE — CANNULA LAP SEAL Z THRD 5X100

## (undated) DEVICE — SUT 4-0 VICRYL / FS-2

## (undated) DEVICE — WARMER BLUE HEAT SCOPE 3-12MM

## (undated) DEVICE — TROCAR KII BLLN 12MM 10CM

## (undated) DEVICE — PENCIL ELECTROSURG HOLST W/BLD

## (undated) DEVICE — SUT PDS II O CT-2 VIL MONO